# Patient Record
Sex: FEMALE | Race: WHITE | URBAN - METROPOLITAN AREA
[De-identification: names, ages, dates, MRNs, and addresses within clinical notes are randomized per-mention and may not be internally consistent; named-entity substitution may affect disease eponyms.]

---

## 2018-09-27 ENCOUNTER — INPATIENT (INPATIENT)
Facility: HOSPITAL | Age: 83
LOS: 6 days | Discharge: HOME HEALTH | End: 2018-10-04
Attending: FAMILY MEDICINE | Admitting: FAMILY MEDICINE
Payer: MEDICARE

## 2018-09-27 VITALS — HEIGHT: 64 IN | WEIGHT: 149.91 LBS

## 2018-09-27 LAB
ALBUMIN SERPL ELPH-MCNC: 3.5 G/DL — SIGNIFICANT CHANGE UP (ref 3.3–5)
ALP SERPL-CCNC: 88 U/L — SIGNIFICANT CHANGE UP (ref 40–120)
ALT FLD-CCNC: 34 U/L — SIGNIFICANT CHANGE UP (ref 12–78)
ANION GAP SERPL CALC-SCNC: 7 MMOL/L — SIGNIFICANT CHANGE UP (ref 5–17)
APPEARANCE UR: CLEAR — SIGNIFICANT CHANGE UP
AST SERPL-CCNC: 31 U/L — SIGNIFICANT CHANGE UP (ref 15–37)
BASOPHILS # BLD AUTO: 0.03 K/UL — SIGNIFICANT CHANGE UP (ref 0–0.2)
BASOPHILS NFR BLD AUTO: 0.4 % — SIGNIFICANT CHANGE UP (ref 0–2)
BILIRUB SERPL-MCNC: 0.2 MG/DL — SIGNIFICANT CHANGE UP (ref 0.2–1.2)
BILIRUB UR-MCNC: NEGATIVE — SIGNIFICANT CHANGE UP
BUN SERPL-MCNC: 34 MG/DL — HIGH (ref 7–23)
CALCIUM SERPL-MCNC: 9 MG/DL — SIGNIFICANT CHANGE UP (ref 8.5–10.1)
CHLORIDE SERPL-SCNC: 109 MMOL/L — HIGH (ref 96–108)
CO2 SERPL-SCNC: 26 MMOL/L — SIGNIFICANT CHANGE UP (ref 22–31)
COLOR SPEC: YELLOW — SIGNIFICANT CHANGE UP
CREAT SERPL-MCNC: 0.92 MG/DL — SIGNIFICANT CHANGE UP (ref 0.5–1.3)
DIFF PNL FLD: NEGATIVE — SIGNIFICANT CHANGE UP
EOSINOPHIL # BLD AUTO: 0.27 K/UL — SIGNIFICANT CHANGE UP (ref 0–0.5)
EOSINOPHIL NFR BLD AUTO: 3.4 % — SIGNIFICANT CHANGE UP (ref 0–6)
GLUCOSE SERPL-MCNC: 106 MG/DL — HIGH (ref 70–99)
GLUCOSE UR QL: NEGATIVE MG/DL — SIGNIFICANT CHANGE UP
HCT VFR BLD CALC: 41.2 % — SIGNIFICANT CHANGE UP (ref 34.5–45)
HGB BLD-MCNC: 13.7 G/DL — SIGNIFICANT CHANGE UP (ref 11.5–15.5)
IMM GRANULOCYTES NFR BLD AUTO: 0.3 % — SIGNIFICANT CHANGE UP (ref 0–1.5)
KETONES UR-MCNC: NEGATIVE — SIGNIFICANT CHANGE UP
LACTATE SERPL-SCNC: 0.9 MMOL/L — SIGNIFICANT CHANGE UP (ref 0.7–2)
LEUKOCYTE ESTERASE UR-ACNC: NEGATIVE — SIGNIFICANT CHANGE UP
LYMPHOCYTES # BLD AUTO: 1.48 K/UL — SIGNIFICANT CHANGE UP (ref 1–3.3)
LYMPHOCYTES # BLD AUTO: 18.6 % — SIGNIFICANT CHANGE UP (ref 13–44)
MCHC RBC-ENTMCNC: 31 PG — SIGNIFICANT CHANGE UP (ref 27–34)
MCHC RBC-ENTMCNC: 33.3 GM/DL — SIGNIFICANT CHANGE UP (ref 32–36)
MCV RBC AUTO: 93.2 FL — SIGNIFICANT CHANGE UP (ref 80–100)
MONOCYTES # BLD AUTO: 0.92 K/UL — HIGH (ref 0–0.9)
MONOCYTES NFR BLD AUTO: 11.6 % — SIGNIFICANT CHANGE UP (ref 2–14)
NEUTROPHILS # BLD AUTO: 5.22 K/UL — SIGNIFICANT CHANGE UP (ref 1.8–7.4)
NEUTROPHILS NFR BLD AUTO: 65.7 % — SIGNIFICANT CHANGE UP (ref 43–77)
NITRITE UR-MCNC: NEGATIVE — SIGNIFICANT CHANGE UP
NT-PROBNP SERPL-SCNC: 455 PG/ML — HIGH (ref 0–450)
PH UR: 5 — SIGNIFICANT CHANGE UP (ref 5–8)
PLATELET # BLD AUTO: 233 K/UL — SIGNIFICANT CHANGE UP (ref 150–400)
POTASSIUM SERPL-MCNC: 4 MMOL/L — SIGNIFICANT CHANGE UP (ref 3.5–5.3)
POTASSIUM SERPL-SCNC: 4 MMOL/L — SIGNIFICANT CHANGE UP (ref 3.5–5.3)
PROT SERPL-MCNC: 7.3 GM/DL — SIGNIFICANT CHANGE UP (ref 6–8.3)
PROT UR-MCNC: 30 MG/DL
RAPID RVP RESULT: SIGNIFICANT CHANGE UP
RBC # BLD: 4.42 M/UL — SIGNIFICANT CHANGE UP (ref 3.8–5.2)
RBC # FLD: 12.5 % — SIGNIFICANT CHANGE UP (ref 10.3–14.5)
SODIUM SERPL-SCNC: 142 MMOL/L — SIGNIFICANT CHANGE UP (ref 135–145)
SP GR SPEC: 1.01 — SIGNIFICANT CHANGE UP (ref 1.01–1.02)
TROPONIN I SERPL-MCNC: 0.05 NG/ML — HIGH (ref 0.01–0.04)
UROBILINOGEN FLD QL: NEGATIVE MG/DL — SIGNIFICANT CHANGE UP
WBC # BLD: 7.94 K/UL — SIGNIFICANT CHANGE UP (ref 3.8–10.5)
WBC # FLD AUTO: 7.94 K/UL — SIGNIFICANT CHANGE UP (ref 3.8–10.5)

## 2018-09-27 PROCEDURE — 99285 EMERGENCY DEPT VISIT HI MDM: CPT

## 2018-09-27 PROCEDURE — 93010 ELECTROCARDIOGRAM REPORT: CPT

## 2018-09-27 PROCEDURE — 71045 X-RAY EXAM CHEST 1 VIEW: CPT | Mod: 26

## 2018-09-27 PROCEDURE — 71250 CT THORAX DX C-: CPT | Mod: 26

## 2018-09-27 RX ORDER — SODIUM CHLORIDE 9 MG/ML
2250 INJECTION INTRAMUSCULAR; INTRAVENOUS; SUBCUTANEOUS ONCE
Qty: 0 | Refills: 0 | Status: COMPLETED | OUTPATIENT
Start: 2018-09-27 | End: 2018-09-27

## 2018-09-27 RX ORDER — IPRATROPIUM/ALBUTEROL SULFATE 18-103MCG
3 AEROSOL WITH ADAPTER (GRAM) INHALATION ONCE
Qty: 0 | Refills: 0 | Status: COMPLETED | OUTPATIENT
Start: 2018-09-27 | End: 2018-09-27

## 2018-09-27 RX ORDER — AZITHROMYCIN 500 MG/1
500 TABLET, FILM COATED ORAL ONCE
Qty: 0 | Refills: 0 | Status: COMPLETED | OUTPATIENT
Start: 2018-09-27 | End: 2018-09-27

## 2018-09-27 RX ORDER — CEFTRIAXONE 500 MG/1
1000 INJECTION, POWDER, FOR SOLUTION INTRAMUSCULAR; INTRAVENOUS ONCE
Qty: 0 | Refills: 0 | Status: COMPLETED | OUTPATIENT
Start: 2018-09-27 | End: 2018-09-27

## 2018-09-27 RX ADMIN — SODIUM CHLORIDE 750 MILLILITER(S): 9 INJECTION INTRAMUSCULAR; INTRAVENOUS; SUBCUTANEOUS at 19:45

## 2018-09-27 RX ADMIN — Medication 3 MILLILITER(S): at 20:00

## 2018-09-27 RX ADMIN — AZITHROMYCIN 255 MILLIGRAM(S): 500 TABLET, FILM COATED ORAL at 21:03

## 2018-09-27 RX ADMIN — CEFTRIAXONE 1000 MILLIGRAM(S): 500 INJECTION, POWDER, FOR SOLUTION INTRAMUSCULAR; INTRAVENOUS at 19:45

## 2018-09-27 RX ADMIN — AZITHROMYCIN 500 MILLIGRAM(S): 500 TABLET, FILM COATED ORAL at 22:00

## 2018-09-27 RX ADMIN — Medication 125 MILLIGRAM(S): at 19:50

## 2018-09-27 NOTE — ED PROVIDER NOTE - MEDICAL DECISION MAKING DETAILS
WBC, lactic acid WNL.  100.0 rectal temp however.  CT scan of chest with scattered ground glass opacities.  RVP pending.  IVF, and antibiotics given.  Admit to medicine for hypoxia in setting of respiratory infection.

## 2018-09-27 NOTE — ED STATDOCS - PROGRESS NOTE DETAILS
Talia FONG for ED attending, Dr. José: 88 y/o female with a PMHx of CAD s/p stents x2, asthma presents to the ED c/o cough, not improving despite 1 week abx. +SOB. Follow up with PMD today who advised pt to go to ED for low oxygen levels, wheezing, and coughing. Pt hypoxic with stat of 92% on room air. Will send pt to main ED for further evaluation.

## 2018-09-27 NOTE — ED PROVIDER NOTE - OBJECTIVE STATEMENT
88 y/o F with a PMHx of CAD s/p stents x2 and Asthma presents to the ED c/o cough, not improving despite 1 week abx. +SOB. Follow up with PMD today who advised pt to go to ED for low oxygen levels, wheezing, and coughing. Pt hypoxic with stat of 92% on room air. NKDA.

## 2018-09-28 DIAGNOSIS — R09.89 OTHER SPECIFIED SYMPTOMS AND SIGNS INVOLVING THE CIRCULATORY AND RESPIRATORY SYSTEMS: ICD-10-CM

## 2018-09-28 LAB
ADD ON TEST-SPECIMEN IN LAB: SIGNIFICANT CHANGE UP
CK SERPL-CCNC: 219 U/L — HIGH (ref 26–192)
CK SERPL-CCNC: 230 U/L — HIGH (ref 26–192)
CULTURE RESULTS: NO GROWTH — SIGNIFICANT CHANGE UP
D DIMER BLD IA.RAPID-MCNC: 228 NG/ML DDU — SIGNIFICANT CHANGE UP
NT-PROBNP SERPL-SCNC: 976 PG/ML — HIGH (ref 0–450)
SPECIMEN SOURCE: SIGNIFICANT CHANGE UP
TROPONIN I SERPL-MCNC: 0.05 NG/ML — HIGH (ref 0.01–0.04)
TROPONIN I SERPL-MCNC: 0.05 NG/ML — HIGH (ref 0.01–0.04)

## 2018-09-28 PROCEDURE — 93010 ELECTROCARDIOGRAM REPORT: CPT

## 2018-09-28 RX ORDER — ONDANSETRON 8 MG/1
4 TABLET, FILM COATED ORAL EVERY 6 HOURS
Qty: 0 | Refills: 0 | Status: DISCONTINUED | OUTPATIENT
Start: 2018-09-28 | End: 2018-10-04

## 2018-09-28 RX ORDER — LACTOBACILLUS ACIDOPHILUS 100MM CELL
1 CAPSULE ORAL
Qty: 0 | Refills: 0 | Status: DISCONTINUED | OUTPATIENT
Start: 2018-09-28 | End: 2018-10-04

## 2018-09-28 RX ORDER — CEFTRIAXONE 500 MG/1
1000 INJECTION, POWDER, FOR SOLUTION INTRAMUSCULAR; INTRAVENOUS EVERY 24 HOURS
Qty: 0 | Refills: 0 | Status: DISCONTINUED | OUTPATIENT
Start: 2018-09-28 | End: 2018-10-02

## 2018-09-28 RX ORDER — LEVOTHYROXINE SODIUM 125 MCG
25 TABLET ORAL DAILY
Qty: 0 | Refills: 0 | Status: DISCONTINUED | OUTPATIENT
Start: 2018-09-28 | End: 2018-10-04

## 2018-09-28 RX ORDER — ASPIRIN/CALCIUM CARB/MAGNESIUM 324 MG
325 TABLET ORAL DAILY
Qty: 0 | Refills: 0 | Status: DISCONTINUED | OUTPATIENT
Start: 2018-09-28 | End: 2018-10-04

## 2018-09-28 RX ORDER — AZITHROMYCIN 500 MG/1
500 TABLET, FILM COATED ORAL EVERY 24 HOURS
Qty: 0 | Refills: 0 | Status: DISCONTINUED | OUTPATIENT
Start: 2018-09-28 | End: 2018-10-02

## 2018-09-28 RX ORDER — MONTELUKAST 4 MG/1
10 TABLET, CHEWABLE ORAL DAILY
Qty: 0 | Refills: 0 | Status: DISCONTINUED | OUTPATIENT
Start: 2018-09-28 | End: 2018-10-04

## 2018-09-28 RX ORDER — FUROSEMIDE 40 MG
20 TABLET ORAL ONCE
Qty: 0 | Refills: 0 | Status: DISCONTINUED | OUTPATIENT
Start: 2018-09-28 | End: 2018-09-30

## 2018-09-28 RX ORDER — AMLODIPINE BESYLATE 2.5 MG/1
5 TABLET ORAL AT BEDTIME
Qty: 0 | Refills: 0 | Status: DISCONTINUED | OUTPATIENT
Start: 2018-09-28 | End: 2018-09-30

## 2018-09-28 RX ORDER — FAMOTIDINE 10 MG/ML
20 INJECTION INTRAVENOUS
Qty: 0 | Refills: 0 | Status: DISCONTINUED | OUTPATIENT
Start: 2018-09-28 | End: 2018-10-04

## 2018-09-28 RX ORDER — SIMVASTATIN 20 MG/1
5 TABLET, FILM COATED ORAL AT BEDTIME
Qty: 0 | Refills: 0 | Status: DISCONTINUED | OUTPATIENT
Start: 2018-09-28 | End: 2018-10-04

## 2018-09-28 RX ORDER — CLOPIDOGREL BISULFATE 75 MG/1
75 TABLET, FILM COATED ORAL DAILY
Qty: 0 | Refills: 0 | Status: DISCONTINUED | OUTPATIENT
Start: 2018-09-28 | End: 2018-10-04

## 2018-09-28 RX ORDER — INFLUENZA VIRUS VACCINE 15; 15; 15; 15 UG/.5ML; UG/.5ML; UG/.5ML; UG/.5ML
0.5 SUSPENSION INTRAMUSCULAR ONCE
Qty: 0 | Refills: 0 | Status: DISCONTINUED | OUTPATIENT
Start: 2018-09-28 | End: 2018-10-04

## 2018-09-28 RX ORDER — IPRATROPIUM/ALBUTEROL SULFATE 18-103MCG
3 AEROSOL WITH ADAPTER (GRAM) INHALATION EVERY 6 HOURS
Qty: 0 | Refills: 0 | Status: DISCONTINUED | OUTPATIENT
Start: 2018-09-28 | End: 2018-10-04

## 2018-09-28 RX ORDER — METOPROLOL TARTRATE 50 MG
100 TABLET ORAL DAILY
Qty: 0 | Refills: 0 | Status: DISCONTINUED | OUTPATIENT
Start: 2018-09-28 | End: 2018-10-04

## 2018-09-28 RX ORDER — CEFTRIAXONE 500 MG/1
1 INJECTION, POWDER, FOR SOLUTION INTRAMUSCULAR; INTRAVENOUS EVERY 24 HOURS
Qty: 0 | Refills: 0 | Status: DISCONTINUED | OUTPATIENT
Start: 2018-09-28 | End: 2018-09-28

## 2018-09-28 RX ADMIN — Medication 40 MILLIGRAM(S): at 14:02

## 2018-09-28 RX ADMIN — Medication 100 MILLIGRAM(S): at 10:25

## 2018-09-28 RX ADMIN — Medication 40 MILLIGRAM(S): at 22:28

## 2018-09-28 RX ADMIN — FAMOTIDINE 20 MILLIGRAM(S): 10 INJECTION INTRAVENOUS at 16:38

## 2018-09-28 RX ADMIN — Medication 10 MILLIGRAM(S): at 16:43

## 2018-09-28 RX ADMIN — Medication 325 MILLIGRAM(S): at 10:27

## 2018-09-28 RX ADMIN — CEFTRIAXONE 1000 MILLIGRAM(S): 500 INJECTION, POWDER, FOR SOLUTION INTRAMUSCULAR; INTRAVENOUS at 16:43

## 2018-09-28 RX ADMIN — MONTELUKAST 10 MILLIGRAM(S): 4 TABLET, CHEWABLE ORAL at 14:02

## 2018-09-28 RX ADMIN — Medication 200 MILLIGRAM(S): at 22:31

## 2018-09-28 RX ADMIN — CLOPIDOGREL BISULFATE 75 MILLIGRAM(S): 75 TABLET, FILM COATED ORAL at 14:02

## 2018-09-28 RX ADMIN — AZITHROMYCIN 255 MILLIGRAM(S): 500 TABLET, FILM COATED ORAL at 16:43

## 2018-09-28 RX ADMIN — Medication 200 MILLIGRAM(S): at 16:43

## 2018-09-28 RX ADMIN — Medication 1 TABLET(S): at 16:37

## 2018-09-28 RX ADMIN — Medication 25 MICROGRAM(S): at 10:25

## 2018-09-28 RX ADMIN — AMLODIPINE BESYLATE 5 MILLIGRAM(S): 2.5 TABLET ORAL at 22:28

## 2018-09-28 RX ADMIN — Medication 3 MILLILITER(S): at 19:50

## 2018-09-28 RX ADMIN — Medication 1 TABLET(S): at 16:36

## 2018-09-28 NOTE — PATIENT PROFILE ADULT. - VISION (WITH CORRECTIVE LENSES IF THE PATIENT USUALLY WEARS THEM):
Partially impaired: cannot see medication labels or newsprint, but can see obstacles in path, and the surrounding layout; can count fingers at arm's length/glasses needed. two pairs at bedside

## 2018-09-28 NOTE — ED ADULT NURSE REASSESSMENT NOTE - NS ED NURSE REASSESS COMMENT FT1
Patient A&Ox4, resting comfortably in bed.  VSS, denies pain/discomfort. Moist cough, scattered rhonchi to auscultation. No s/s of respiratory distress present. Wait time explained, hospitalist consult pending. Safety & comfort measures in place, will continue to monitor.

## 2018-09-28 NOTE — CONSULT NOTE ADULT - ASSESSMENT
Elevated cardiac enzymes- Cardiac enzymes are mildly elevated and almost in the same range.  This could likely be demand from ongoing clinical noncardiac issues.  Close monitoring recommended for now and pt to f/u with cardiologist after discharge from the hospital as outpt.     Cough , Pneumonia- Management pre primary team.    CAD S/P PCI- continue outpt meds.    Other medical issues- Management per primary team.   Thank you for allowing me to participate in the care of this patient. Please feel free to contact me with any questions.

## 2018-09-28 NOTE — H&P ADULT - ASSESSMENT
86 y/o female Bulgarian speaking, try to use   # [id number], but patient do not understand the Bulgarian  either , asking to wait for her daughter to speak to her. Most of the history taken from the chart review. Patient with a PMHx of CAD s/p stents x2 and Asthma presents to the HH with c/o cough, not improving despite 1 week antibiotics, dyspnea on exertion, wheezing. Follow up with PMD today who advised pt to go to ED for low oxygen levels, wheezing, and coughing. Pt hypoxic with stat of 92% on room air.    Denies CP, palpitations, abdominal pain, afebrile, + productive cough.    In ED -T(F): 97.7, Max: 100 HR: 98  (69 - 98) BP: 148/99  (148/73 - 176/65) RR: 18  (17 - 19) SpO2: 98%  (93% - 98%) EKG - NSR 81, no ischemic changes , troponin 0.04,   CT chest - scattered opacities may represent small vessel disease infectious vs inflammatory    s/p solumedrol 125 mg, s/p IV ceftriaxone and Azithromycin     * Acute hypoxemic respiratory failure due to   * Acute asthma exacerbation  * Suspected CAP  - tele   - O2 supplementation  - IV steroids with GI protection  - IV ceftriaxone , Azithromycin   - nebs, mucolytics  - pulmonology consult    * Elevated troponin suspected demand ischemia from respiratory problems r/o ACS  * Elevated BNP r/o CHF  - serial troponin and EKG  - check TSH, A1c, lipid profile, TSH  - 2 d echo  - cardiology consult  - c/w DAPT, BB, statins    * HTN  - c/w home meds CCB, BB, ACEi    * HLD  - c/w nstatins    * Hypothyroidism  - c/w levothyroxine   - c/w TSH     * GERD  - c/w famotidine    DVT proph - IPC  IMPROVE VTE Individual Risk Assessment    RISK                                                                Points    [  ] Previous VTE                                                  3    [  ] Thrombophilia                                               2    [  ] Lower limb paralysis                                      2        (unable to hold up >15 seconds)      [  ] Current Cancer                                              2         (within 6 months)    [  ] Immobilization > 24 hrs                                1    [  ] ICU/CCU stay > 24 hours                              1    [ x ] Age > 60                                                      1    IMPROVE VTE Score __1_______    Advanced directives - 86 y/o female Tajik speaking, try to use   # [id number], but patient do not understand the Tajik  either , asking to wait for her daughter to speak to her. Most of the history taken from the chart review. Patient with a PMHx of CAD s/p stents x2 and Asthma presents to the HH with c/o cough, not improving despite 1 week antibiotics, dyspnea on exertion, wheezing. Follow up with PMD today who advised pt to go to ED for low oxygen levels, wheezing, and coughing. Pt hypoxic with stat of 92% on room air.    Denies CP, palpitations, abdominal pain, afebrile, + productive cough.    In ED -T(F): 97.7, Max: 100 HR: 98  (69 - 98) BP: 148/99  (148/73 - 176/65) RR: 18  (17 - 19) SpO2: 98%  (93% - 98%) EKG - NSR 81, no ischemic changes , troponin 0.04,   CT chest - scattered opacities may represent small vessel disease infectious vs inflammatory    s/p solumedrol 125 mg, s/p IV ceftriaxone and Azithromycin     * Acute hypoxemic respiratory failure due to   * Acute asthma exacerbation  * Suspected CAP  - tele   - O2 supplementation  - IV steroids with GI protection  - IV ceftriaxone , Azithromycin   - nebs, mucolytics  - pulmonology consult  - check d-dimers if positive will do CTA, doppler    * Elevated troponin suspected demand ischemia from respiratory problems r/o ACS  * Elevated BNP r/o CHF  - serial troponin and EKG  - check TSH, A1c, lipid profile, TSH  - 2 d echo  - cardiology consult  - c/w DAPT, BB, statins    * HTN  - c/w home meds CCB, BB, ACEi    * HLD  - c/w nstatins    * Hypothyroidism  - c/w levothyroxine   - c/w TSH     * GERD  - c/w famotidine    DVT proph - IPC  IMPROVE VTE Individual Risk Assessment    RISK                                                                Points    [  ] Previous VTE                                                  3    [  ] Thrombophilia                                               2    [  ] Lower limb paralysis                                      2        (unable to hold up >15 seconds)      [  ] Current Cancer                                              2         (within 6 months)    [  ] Immobilization > 24 hrs                                1    [  ] ICU/CCU stay > 24 hours                              1    [ x ] Age > 60                                                      1    IMPROVE VTE Score __1_______    Advanced directives - 15 minutes spend  Full code

## 2018-09-28 NOTE — H&P ADULT - NSHPREVIEWOFSYSTEMS_GEN_ALL_CORE
REVIEW OF SYSTEMS:    CONSTITUTIONAL: No weakness, fevers or chills  EYES/ENT: No visual changes;  No vertigo or throat pain   NECK: No pain or stiffness  RESPIRATORY: +  cough, + wheezing, no hemoptysis; +  shortness of breath  CARDIOVASCULAR: No chest pain or palpitations  GASTROINTESTINAL: No abdominal or epigastric pain. No nausea, vomiting, or hematemesis; No diarrhea or constipation. No melena or hematochezia.  GENITOURINARY: No dysuria, frequency or hematuria  NEUROLOGICAL: No numbness or weakness  SKIN: No itching, burning, rashes, or lesions   All other review of systems is negative unless indicated above.

## 2018-09-28 NOTE — H&P ADULT - PMH
Asthma    CAD (coronary artery disease)    GERD (gastroesophageal reflux disease)    HTN (hypertension)    Hyperlipidemia

## 2018-09-28 NOTE — H&P ADULT - NSHPSOCIALHISTORY_GEN_ALL_CORE
Family history - poor historian unable to collect  Denies smoking, ETOH use, IVDU Denies smoking, ETOH use, IVDU

## 2018-09-28 NOTE — H&P ADULT - NSHPPHYSICALEXAM_GEN_ALL_CORE
PHYSICAL EXAM:    Constitutional: NAD, awake and alert, well-developed  HEENT: PERR, EOMI, Normal Hearing, MMM  Neck: Soft and supple, No LAD, No JVD  Respiratory: Breath sounds are decreased  bilaterally, +  wheezing, + rales , + rhonchi  Cardiovascular: S1 and S2, regular rate and rhythm, no Murmurs, gallops or rubs  Gastrointestinal: Bowel Sounds present, soft, nontender, nondistended, no guarding, no rebound  Extremities: No peripheral edema  Vascular: 2+ peripheral pulses  Neurological: A/O x 3, no focal deficits  Musculoskeletal: 5/5 strength b/l upper and lower extremities  Skin: No rashes  rectal : deferred, not indicated

## 2018-09-28 NOTE — H&P ADULT - HISTORY OF PRESENT ILLNESS
86 y/o female Tajik speaking, try to use   # [id number], but patient do not understand the Tajik  either , asking to wait for her daughter to speak to her. Most of the history taken from the chart review. Patient with a PMHx of CAD s/p stents x2 and Asthma presents to the HH with c/o cough, not improving despite 1 week antibiotics, dyspnea on exertion, wheezing. Follow up with PMD today who advised pt to go to ED for low oxygen levels, wheezing, and coughing. Pt hypoxic with stat of 92% on room air.    Denies CP, palpitations, abdominal pain, afebrile, + productive cough.    In ED -T(F): 97.7, Max: 100 HR: 98  (69 - 98) BP: 148/99  (148/73 - 176/65) RR: 18  (17 - 19) SpO2: 98%  (93% - 98%) EKG - NSR 81, no ischemic changes , troponin 0.04,   CT chest - scattered opacities may represent small vessel disease infectious vs inflammatory

## 2018-09-28 NOTE — CONSULT NOTE ADULT - ASSESSMENT
87y old Female with PMH GERD, HLD, HTN, CAD s/p stent x 2, who presented with productive cough, wheezing and dyspnea on exertion, that persisted despite 1 week of antibiotics. Patient presented at the recommendation of her  PMD. 87y old Female with PMH GERD, HLD, HTN, CAD s/p stent x 2, who presented with productive cough, wheezing and dyspnea on exertion, that persisted despite 1 week of azithromycin; she likely has asthma exacerbation, a component of volume overload, and perhaps a pneumonia.  -Continue methylprednisolone and duonebs q6  -She will need outpatient PFTs however family states that she will not be able to perform this; alternatively, we can trial her on a maintenance inhaled ICS upon discharge  -Obtain sputum culture  -Continue ceftriaxone and azithromycin  -RVP negative  -Given crackles and elevated BNP would diurese with lasix    Will continue to follow

## 2018-09-28 NOTE — ED ADULT NURSE REASSESSMENT NOTE - NS ED NURSE REASSESS COMMENT FT1
Patient resting in bed, remains as previously assessed. O2 saturation drops on room air, as low as 89%. SpO2 currently 99% on 3L NC. No s/s of respiratory distress present at this time. Plan of care discussed with patient and pt's daughter. Hand-off report to OCTAVIO Prather, awaiting transport to [facility]. Safety & comfort measures in place, hourly rounding on my time.

## 2018-09-28 NOTE — CONSULT NOTE ADULT - SUBJECTIVE AND OBJECTIVE BOX
HPI: MIKE ROPER is a 87y old Female with PMH GERD, HLD, HTN, CAD s/p stent x 2, who presented with productive cough, wheezing and dyspnea on exertion, that persisted despite 1 week of antibiotics. Patient presented at the recommendation of her  PMD.    Past Medical History:   GERD (gastroesophageal reflux disease)  Hyperlipidemia  HTN (hypertension)  Asthma  CAD (coronary artery disease)    Past Surgical History:   No significant past surgical history    Family History:    Family history of heart attack (Father)  No pertinent family history in first degree relatives     Social History:     Review of Systems:  Unable to obtain given language barrier    Allergies:  No Known Allergies    Meds  MEDICATIONS  (STANDING):  ALBUTerol/ipratropium for Nebulization 3 milliLiter(s) Nebulizer every 6 hours  amLODIPine   Tablet 5 milliGRAM(s) Oral at bedtime  aspirin enteric coated 325 milliGRAM(s) Oral daily  azithromycin  IVPB 500 milliGRAM(s) IV Intermittent every 24 hours  calcium carbonate 1250 mG  + Vitamin D (OsCal 500 + D) 1 Tablet(s) Oral daily  cefTRIAXone Injectable. 1000 milliGRAM(s) IV Push every 24 hours  clopidogrel Tablet 75 milliGRAM(s) Oral daily  enalapril 10 milliGRAM(s) Oral daily  famotidine    Tablet 20 milliGRAM(s) Oral two times a day  guaiFENesin    Syrup 200 milliGRAM(s) Oral every 8 hours  influenza   Vaccine 0.5 milliLiter(s) IntraMuscular once  lactobacillus acidophilus 1 Tablet(s) Oral two times a day with meals  levothyroxine 25 MICROGram(s) Oral daily  methylPREDNISolone sodium succinate Injectable 40 milliGRAM(s) IV Push every 8 hours  metoprolol succinate  milliGRAM(s) Oral daily  montelukast 10 milliGRAM(s) Oral daily  simvastatin 5 milliGRAM(s) Oral at bedtime    MEDICATIONS  (PRN):  aluminum hydroxide/magnesium hydroxide/simethicone Suspension 30 milliLiter(s) Oral every 4 hours PRN Dyspepsia  ondansetron Injectable 4 milliGRAM(s) IV Push every 6 hours PRN Nausea    Physical Exam  Gen: Alert, oriented, no distress  HEENT: Anicteric sclera, moist mucous membranes, no JVD, no lymphadenopathy, good dentition  Cardio: Regular rhythm and rate, normal S1S2, no murmurs  Resp: Clear to auscultation bilaterally, no wheezing or rhonchi  GI: Nontender, nondistended, normoactive bowel sounds  Ext: No cyanosis, clubbing or edema  Neuro: Nonfocal    Labs:                        13.7   7.94  )-----------( 233      ( 27 Sep 2018 19:29 )             41.2         142  |  109<H>  |  34<H>  ----------------------------<  106<H>  4.0   |  26  |  0.92    Ca    9.0      27 Sep 2018 19:29    TPro  7.3  /  Alb  3.5  /  TBili  0.2  /  DBili  x   /  AST  31  /  ALT  34  /  AlkPhos  88        Urinalysis Basic - ( 27 Sep 2018 19:29 )    Color: Yellow / Appearance: Clear / S.015 / pH: x  Gluc: x / Ketone: Negative  / Bili: Negative / Urobili: Negative mg/dL   Blood: x / Protein: 30 mg/dL / Nitrite: Negative   Leuk Esterase: Negative / RBC: 0-2 /HPF / WBC 0-2   Sq Epi: x / Non Sq Epi: Occasional / Bacteria: Occasional    Lungs, Airways and Pleura: Central tracheobronchial tree is grossly   patent. Noendobronchial lesions. Trace right pleural effusion.   Subsegmental atelectasis in the lingula and anterior left lower lobe.   Scattered areas of groundglass opacities with peribronchial thickening   nonspecific in nature may represent small vessel small airway disease   however a infectious or inflammatory etiology cannot be excluded. No   segmental consolidations.    Heart and Great Vessels: Atherosclerotic changes of the aorta and   coronary vasculature. Is mildly enlarged. No pericardial effusions.    Mediastinum and Roberta: within normal limits    Neck and Chest Wall: within normal limits    Bones: Degenerative changes and osteopenia. Nondisplaced anterior left   third through sixth rib fractures may be chronic in nature.     Upper Abdomen:  Gallstones. Mild elevation the right hemidiaphragm. Left   adrenal thickening. Small hiatal hernia. Subcentimeter hypodensities in   the liver too small to characterize.    IMPRESSION:      Scattered areas of groundglass opacities with peribronchial thickening   nonspecific in nature may represent small vessel small airway disease   however a infectious or inflammatory etiology cannot be excluded. No   segmental consolidations       RVP negative  D dimer 228    Trop 0.049 to 0.053   to 976    CXR mild pulmonary vascular congestion HPI: MIKE ROPER is a 87y old Female with PMH GERD, HLD, HTN, CAD s/p stent x 2, who presented with productive cough, wheezing and dyspnea on exertion, that persisted despite 1 week of azithromycin. Patient presented at the recommendation of her PMD as O2 sats were 92% in the office. History was obtained by daughters at bedside as patient speaks only Irish. Apparently in the past patient had never smoked. They denied a family history of lung disease. She is not on O2 as an outpatient. Daughters are concerned she minimizes her symptoms. They deny any fevers at home.    Past Medical History:   GERD (gastroesophageal reflux disease)  Hyperlipidemia  HTN (hypertension)  Asthma  CAD (coronary artery disease)    Past Surgical History:   No significant past surgical history    Family History:    Family history of heart attack (Father)  No pertinent family history in first degree relatives     Social History: Never smoker    Review of Systems:  Unable to obtain given language barrier    Allergies:  No Known Allergies    Meds  MEDICATIONS  (STANDING):  ALBUTerol/ipratropium for Nebulization 3 milliLiter(s) Nebulizer every 6 hours  amLODIPine   Tablet 5 milliGRAM(s) Oral at bedtime  aspirin enteric coated 325 milliGRAM(s) Oral daily  azithromycin  IVPB 500 milliGRAM(s) IV Intermittent every 24 hours  calcium carbonate 1250 mG  + Vitamin D (OsCal 500 + D) 1 Tablet(s) Oral daily  cefTRIAXone Injectable. 1000 milliGRAM(s) IV Push every 24 hours  clopidogrel Tablet 75 milliGRAM(s) Oral daily  enalapril 10 milliGRAM(s) Oral daily  famotidine    Tablet 20 milliGRAM(s) Oral two times a day  guaiFENesin    Syrup 200 milliGRAM(s) Oral every 8 hours  influenza   Vaccine 0.5 milliLiter(s) IntraMuscular once  lactobacillus acidophilus 1 Tablet(s) Oral two times a day with meals  levothyroxine 25 MICROGram(s) Oral daily  methylPREDNISolone sodium succinate Injectable 40 milliGRAM(s) IV Push every 8 hours  metoprolol succinate  milliGRAM(s) Oral daily  montelukast 10 milliGRAM(s) Oral daily  simvastatin 5 milliGRAM(s) Oral at bedtime    MEDICATIONS  (PRN):  aluminum hydroxide/magnesium hydroxide/simethicone Suspension 30 milliLiter(s) Oral every 4 hours PRN Dyspepsia  ondansetron Injectable 4 milliGRAM(s) IV Push every 6 hours PRN Nausea    Physical Exam  Gen: Alert, oriented, no distress  HEENT: Anicteric sclera, moist mucous membranes, no JVD, no lymphadenopathy, good dentition  Cardio: Regular rhythm and rate, normal S1S2, no murmurs  Resp: Wheezing bilaterally, with crackles at bases  GI: Nontender, nondistended, normoactive bowel sounds  Ext: No cyanosis, clubbing or edema  Neuro: Nonfocal    Labs:                        13.7   7.94  )-----------( 233      ( 27 Sep 2018 19:29 )             41.2         142  |  109<H>  |  34<H>  ----------------------------<  106<H>  4.0   |  26  |  0.92    Ca    9.0      27 Sep 2018 19:29    TPro  7.3  /  Alb  3.5  /  TBili  0.2  /  DBili  x   /  AST  31  /  ALT  34  /  AlkPhos  88        Urinalysis Basic - ( 27 Sep 2018 19:29 )    Color: Yellow / Appearance: Clear / S.015 / pH: x  Gluc: x / Ketone: Negative  / Bili: Negative / Urobili: Negative mg/dL   Blood: x / Protein: 30 mg/dL / Nitrite: Negative   Leuk Esterase: Negative / RBC: 0-2 /HPF / WBC 0-2   Sq Epi: x / Non Sq Epi: Occasional / Bacteria: Occasional    Lungs, Airways and Pleura: Central tracheobronchial tree is grossly   patent. Noendobronchial lesions. Trace right pleural effusion.   Subsegmental atelectasis in the lingula and anterior left lower lobe.   Scattered areas of groundglass opacities with peribronchial thickening   nonspecific in nature may represent small vessel small airway disease   however a infectious or inflammatory etiology cannot be excluded. No   segmental consolidations.    Heart and Great Vessels: Atherosclerotic changes of the aorta and   coronary vasculature. Is mildly enlarged. No pericardial effusions.    Mediastinum and Roberta: within normal limits    Neck and Chest Wall: within normal limits    Bones: Degenerative changes and osteopenia. Nondisplaced anterior left   third through sixth rib fractures may be chronic in nature.     Upper Abdomen:  Gallstones. Mild elevation the right hemidiaphragm. Left   adrenal thickening. Small hiatal hernia. Subcentimeter hypodensities in   the liver too small to characterize.    IMPRESSION:      Scattered areas of groundglass opacities with peribronchial thickening   nonspecific in nature may represent small vessel small airway disease   however a infectious or inflammatory etiology cannot be excluded. No   segmental consolidations       RVP negative  D dimer 228    Trop 0.049 to 0.053   to 976    CXR mild pulmonary vascular congestion

## 2018-09-28 NOTE — CONSULT NOTE ADULT - SUBJECTIVE AND OBJECTIVE BOX
Patient is a 87y old  Female who presents with a chief complaint of cough, dyspnea.     HPI:  88 y/o obese female Macedonian speaking, with a PMHx of CAD s/p PCI of RCA and Asthma presents to the HH with c/o cough, not improving despite 1 week antibiotics, dyspnea on exertion, wheezing. Follow up with PMD today who advised pt to go to ED for low oxygen levels, wheezing, and coughing. Pt hypoxic with stat of 92% on room air.    Denies CP, palpitations, abdominal pain, afebrile, + productive cough.    She denies any CP or SOB. Her daughter is at bedside translating for the patient.      In ED -T(F): 97.7, Max: 100 HR: 98  (69 - 98) BP: 148/99  (148/73 - 176/65) RR: 18  (17 - 19) SpO2: 98%  (93% - 98%) EKG - NSR 81, no ischemic changes , troponin 0.04,   CT chest - scattered opacities may represent small vessel disease infectious vs inflammatory (28 Sep 2018 09:50)      PAST MEDICAL & SURGICAL HISTORY:  GERD (gastroesophageal reflux disease)  Hyperlipidemia  HTN (hypertension)  Asthma  CAD (coronary artery disease)  No significant past surgical history      MEDICATIONS  (STANDING):  ALBUTerol/ipratropium for Nebulization 3 milliLiter(s) Nebulizer every 6 hours  amLODIPine   Tablet 5 milliGRAM(s) Oral at bedtime  aspirin enteric coated 325 milliGRAM(s) Oral daily  azithromycin  IVPB 500 milliGRAM(s) IV Intermittent every 24 hours  calcium carbonate 1250 mG  + Vitamin D (OsCal 500 + D) 1 Tablet(s) Oral daily  cefTRIAXone Injectable. 1000 milliGRAM(s) IV Push every 24 hours  clopidogrel Tablet 75 milliGRAM(s) Oral daily  enalapril 10 milliGRAM(s) Oral daily  famotidine    Tablet 20 milliGRAM(s) Oral two times a day  guaiFENesin    Syrup 200 milliGRAM(s) Oral every 8 hours  influenza   Vaccine 0.5 milliLiter(s) IntraMuscular once  lactobacillus acidophilus 1 Tablet(s) Oral two times a day with meals  levothyroxine 25 MICROGram(s) Oral daily  methylPREDNISolone sodium succinate Injectable 40 milliGRAM(s) IV Push every 8 hours  metoprolol succinate  milliGRAM(s) Oral daily  montelukast 10 milliGRAM(s) Oral daily  simvastatin 5 milliGRAM(s) Oral at bedtime    MEDICATIONS  (PRN):  aluminum hydroxide/magnesium hydroxide/simethicone Suspension 30 milliLiter(s) Oral every 4 hours PRN Dyspepsia  ondansetron Injectable 4 milliGRAM(s) IV Push every 6 hours PRN Nausea      FAMILY HISTORY:  Family history of heart attack (Father)      SOCIAL HISTORY:  no smoking or alcohol use in recent past.     REVIEW OF SYSTEMS:  CONSTITUTIONAL:    No fatigue, malaise, lethargy.  No fever or chills.  HEENT:  Eyes:  No visual changes.     ENT:  No epistaxis.  No sinus pain.    RESPIRATORY:  c/o cough.  No wheeze.  No hemoptysis.  c/o shortness of breath.  CARDIOVASCULAR:  No chest pains.  No palpitations. c/o shortness of breath, No orthopnea or PND.  GASTROINTESTINAL:  No abdominal pain.  No nausea or vomiting.    GENITOURINARY:    No hematuria.    MUSCULOSKELETAL:  No musculoskeletal pain.  No joint swelling.  No arthritis.  NEUROLOGICAL:  No tingling or numbness or weakness.  PSYCHIATRIC:  No confusion  SKIN:  No rashes.    ENDOCRINE:  No unexplained weight loss.  No polydipsia.   HEMATOLOGIC:  No anemia.  No prolonged or excessive bleeding.   ALLERGIC AND IMMUNOLOGIC:  No pruritus.          Vital Signs Last 24 Hrs  T(C): 36.7 (28 Sep 2018 11:52), Max: 37.8 (27 Sep 2018 19:46)  T(F): 98.1 (28 Sep 2018 11:52), Max: 100 (27 Sep 2018 19:46)  HR: 65 (28 Sep 2018 12:35) (65 - 98)  BP: 142/72 (28 Sep 2018 12:35) (142/72 - 185/69)  BP(mean): 96 (28 Sep 2018 02:28) (96 - 96)  RR: 22 (28 Sep 2018 11:52) (14 - 22)  SpO2: 98% (28 Sep 2018 11:52) (93% - 99%)    PHYSICAL EXAM-    Constitutional: The patient appears to be normal, well developed, well nourished and alert and oriented to time, place and person. The patient does not appear acutely ill.     Head: Head is normocephalic and atraumatic.      Neck: No jugular venous distention. No audible carotid bruits. There are strong carotid pulses bilaterally. No JVD.     Cardiovascular: Regular rate and rhythm without S3, S4. No murmurs or rubs are appreciated.      Respiratory: Breath sounds are normal. No rales. No wheezing.    Abdomen: Soft, nontender, nondistended with positive bowel sounds.      Extremity: No tenderness. No  pitting edema     Neurologic: The patient is alert and oriented.      Skin: No rash, no obvious lesions noted.      Psychiatric: The patient appears to be emotionally stable.      INTERPRETATION OF TELEMETRY: sinus rythm    ECG: Sinus rythm , poor R wave progression.     I&O's Detail      LABS:                        13.7   7.94  )-----------( 233      ( 27 Sep 2018 19:29 )             41.2         142  |  109<H>  |  34<H>  ----------------------------<  106<H>  4.0   |  26  |  0.92    Ca    9.0      27 Sep 2018 19:29    TPro  7.3  /  Alb  3.5  /  TBili  0.2  /  DBili  x   /  AST  31  /  ALT  34  /  AlkPhos  88      CARDIAC MARKERS ( 28 Sep 2018 09:46 )  0.053 ng/mL / x     / 230 U/L / x     / x      CARDIAC MARKERS ( 27 Sep 2018 19:29 )  0.049 ng/mL / x     / x     / x     / x            Urinalysis Basic - ( 27 Sep 2018 19:29 )    Color: Yellow / Appearance: Clear / S.015 / pH: x  Gluc: x / Ketone: Negative  / Bili: Negative / Urobili: Negative mg/dL   Blood: x / Protein: 30 mg/dL / Nitrite: Negative   Leuk Esterase: Negative / RBC: 0-2 /HPF / WBC 0-2   Sq Epi: x / Non Sq Epi: Occasional / Bacteria: Occasional      I&O's Summary    BNPSerum Pro-Brain Natriuretic Peptide: 976 pg/mL ( @ 09:46)  Serum Pro-Brain Natriuretic Peptide: 455 pg/mL ( @ 19:29)    RADIOLOGY & ADDITIONAL STUDIES:  < from: CT Chest No Cont (18 @ 20:25) >    EXAM:  CT CHEST                            PROCEDURE DATE:  2018          INTERPRETATION:  Exam Date: 2018 8:25 PM  Clinical Information: Hypoxia, cough  Technique:       CT of the chest was performed with axial images obtained   from the thoracic to the inlet to the bilateral adrenal glands       without IV contrast  . Maximum intensity projection images were obtained.  Comparison:  None    FINDINGS:    Lungs, Airways and Pleura: Central tracheobronchial tree is grossly   patent. Noendobronchial lesions. Trace right pleural effusion.   Subsegmental atelectasis in the lingula and anterior left lower lobe.   Scattered areas of groundglass opacities with peribronchial thickening   nonspecific in nature may represent small vessel small airway disease   however a infectious or inflammatory etiology cannot be excluded. No   segmental consolidations.    Heart and Great Vessels: Atherosclerotic changes of the aorta and   coronary vasculature. Is mildly enlarged. No pericardial effusions.    Mediastinum and Roberta: within normal limits    Neck and Chest Wall: within normal limits    Bones: Degenerative changes and osteopenia. Nondisplaced anterior left   third through sixth rib fractures may be chronic in nature.     Upper Abdomen:  Gallstones. Mild elevation the right hemidiaphragm. Left   adrenal thickening. Small hiatal hernia. Subcentimeter hypodensities in   the liver too small to characterize.    IMPRESSION:      Scattered areas of groundglass opacities with peribronchial thickening   nonspecific in nature may represent small vessel small airway disease   however a infectious or inflammatory etiology cannot be excluded. No   segmental consolidations                 BROOKS FREED M.D., ATTENDING RADIOLOGIST  This documenthas been electronically signed. Sep 27 2018  8:31PM                < end of copied text >

## 2018-09-28 NOTE — H&P ADULT - FAMILY HISTORY
No pertinent family history in first degree relatives Father  Still living? Unknown  Family history of heart attack, Age at diagnosis: Age Unknown

## 2018-09-29 LAB
ANION GAP SERPL CALC-SCNC: 6 MMOL/L — SIGNIFICANT CHANGE UP (ref 5–17)
BASOPHILS # BLD AUTO: 0.01 K/UL — SIGNIFICANT CHANGE UP (ref 0–0.2)
BASOPHILS NFR BLD AUTO: 0.1 % — SIGNIFICANT CHANGE UP (ref 0–2)
BUN SERPL-MCNC: 25 MG/DL — HIGH (ref 7–23)
CALCIUM SERPL-MCNC: 8.4 MG/DL — LOW (ref 8.5–10.1)
CHLORIDE SERPL-SCNC: 110 MMOL/L — HIGH (ref 96–108)
CHOLEST SERPL-MCNC: 125 MG/DL — SIGNIFICANT CHANGE UP (ref 10–199)
CO2 SERPL-SCNC: 27 MMOL/L — SIGNIFICANT CHANGE UP (ref 22–31)
CREAT SERPL-MCNC: 0.84 MG/DL — SIGNIFICANT CHANGE UP (ref 0.5–1.3)
EOSINOPHIL # BLD AUTO: 0 K/UL — SIGNIFICANT CHANGE UP (ref 0–0.5)
EOSINOPHIL NFR BLD AUTO: 0 % — SIGNIFICANT CHANGE UP (ref 0–6)
GLUCOSE SERPL-MCNC: 186 MG/DL — HIGH (ref 70–99)
HBA1C BLD-MCNC: 6.1 % — HIGH (ref 4–5.6)
HCT VFR BLD CALC: 44.3 % — SIGNIFICANT CHANGE UP (ref 34.5–45)
HDLC SERPL-MCNC: 49 MG/DL — LOW
HGB BLD-MCNC: 14.6 G/DL — SIGNIFICANT CHANGE UP (ref 11.5–15.5)
IMM GRANULOCYTES NFR BLD AUTO: 0.5 % — SIGNIFICANT CHANGE UP (ref 0–1.5)
LIPID PNL WITH DIRECT LDL SERPL: 66 MG/DL — SIGNIFICANT CHANGE UP
LYMPHOCYTES # BLD AUTO: 0.78 K/UL — LOW (ref 1–3.3)
LYMPHOCYTES # BLD AUTO: 5.2 % — LOW (ref 13–44)
MAGNESIUM SERPL-MCNC: 2.4 MG/DL — SIGNIFICANT CHANGE UP (ref 1.6–2.6)
MCHC RBC-ENTMCNC: 31.1 PG — SIGNIFICANT CHANGE UP (ref 27–34)
MCHC RBC-ENTMCNC: 33 GM/DL — SIGNIFICANT CHANGE UP (ref 32–36)
MCV RBC AUTO: 94.3 FL — SIGNIFICANT CHANGE UP (ref 80–100)
MONOCYTES # BLD AUTO: 0.33 K/UL — SIGNIFICANT CHANGE UP (ref 0–0.9)
MONOCYTES NFR BLD AUTO: 2.2 % — SIGNIFICANT CHANGE UP (ref 2–14)
NEUTROPHILS # BLD AUTO: 13.82 K/UL — HIGH (ref 1.8–7.4)
NEUTROPHILS NFR BLD AUTO: 92 % — HIGH (ref 43–77)
NRBC # BLD: 0 /100 WBCS — SIGNIFICANT CHANGE UP (ref 0–0)
NT-PROBNP SERPL-SCNC: 1245 PG/ML — HIGH (ref 0–450)
PHOSPHATE SERPL-MCNC: 3.1 MG/DL — SIGNIFICANT CHANGE UP (ref 2.5–4.5)
PLATELET # BLD AUTO: 249 K/UL — SIGNIFICANT CHANGE UP (ref 150–400)
POTASSIUM SERPL-MCNC: 4.2 MMOL/L — SIGNIFICANT CHANGE UP (ref 3.5–5.3)
POTASSIUM SERPL-SCNC: 4.2 MMOL/L — SIGNIFICANT CHANGE UP (ref 3.5–5.3)
RBC # BLD: 4.7 M/UL — SIGNIFICANT CHANGE UP (ref 3.8–5.2)
RBC # FLD: 12.4 % — SIGNIFICANT CHANGE UP (ref 10.3–14.5)
SODIUM SERPL-SCNC: 143 MMOL/L — SIGNIFICANT CHANGE UP (ref 135–145)
TOTAL CHOLESTEROL/HDL RATIO MEASUREMENT: 2.6 RATIO — LOW (ref 3.3–7.1)
TRIGL SERPL-MCNC: 50 MG/DL — SIGNIFICANT CHANGE UP (ref 10–149)
TSH SERPL-MCNC: 0.35 UU/ML — SIGNIFICANT CHANGE UP (ref 0.34–4.82)
WBC # BLD: 15.01 K/UL — HIGH (ref 3.8–10.5)
WBC # FLD AUTO: 15.01 K/UL — HIGH (ref 3.8–10.5)

## 2018-09-29 PROCEDURE — 93306 TTE W/DOPPLER COMPLETE: CPT | Mod: 26

## 2018-09-29 PROCEDURE — 93010 ELECTROCARDIOGRAM REPORT: CPT

## 2018-09-29 RX ORDER — FUROSEMIDE 40 MG
20 TABLET ORAL ONCE
Qty: 0 | Refills: 0 | Status: COMPLETED | OUTPATIENT
Start: 2018-09-29 | End: 2018-09-29

## 2018-09-29 RX ORDER — ALPRAZOLAM 0.25 MG
0.25 TABLET ORAL ONCE
Qty: 0 | Refills: 0 | Status: DISCONTINUED | OUTPATIENT
Start: 2018-09-29 | End: 2018-09-29

## 2018-09-29 RX ADMIN — Medication 1 TABLET(S): at 07:59

## 2018-09-29 RX ADMIN — Medication 3 MILLILITER(S): at 13:18

## 2018-09-29 RX ADMIN — Medication 0.25 MILLIGRAM(S): at 22:08

## 2018-09-29 RX ADMIN — Medication 1 TABLET(S): at 11:13

## 2018-09-29 RX ADMIN — Medication 100 MILLIGRAM(S): at 06:01

## 2018-09-29 RX ADMIN — SIMVASTATIN 5 MILLIGRAM(S): 20 TABLET, FILM COATED ORAL at 00:00

## 2018-09-29 RX ADMIN — Medication 3 MILLILITER(S): at 07:51

## 2018-09-29 RX ADMIN — Medication 200 MILLIGRAM(S): at 13:50

## 2018-09-29 RX ADMIN — Medication 40 MILLIGRAM(S): at 13:50

## 2018-09-29 RX ADMIN — CEFTRIAXONE 1000 MILLIGRAM(S): 500 INJECTION, POWDER, FOR SOLUTION INTRAMUSCULAR; INTRAVENOUS at 16:48

## 2018-09-29 RX ADMIN — SIMVASTATIN 5 MILLIGRAM(S): 20 TABLET, FILM COATED ORAL at 22:09

## 2018-09-29 RX ADMIN — Medication 1 TABLET(S): at 17:39

## 2018-09-29 RX ADMIN — Medication 10 MILLIGRAM(S): at 07:59

## 2018-09-29 RX ADMIN — AMLODIPINE BESYLATE 5 MILLIGRAM(S): 2.5 TABLET ORAL at 22:09

## 2018-09-29 RX ADMIN — Medication 3 MILLILITER(S): at 20:22

## 2018-09-29 RX ADMIN — Medication 325 MILLIGRAM(S): at 11:15

## 2018-09-29 RX ADMIN — AZITHROMYCIN 255 MILLIGRAM(S): 500 TABLET, FILM COATED ORAL at 16:48

## 2018-09-29 RX ADMIN — FAMOTIDINE 20 MILLIGRAM(S): 10 INJECTION INTRAVENOUS at 17:39

## 2018-09-29 RX ADMIN — Medication 200 MILLIGRAM(S): at 06:02

## 2018-09-29 RX ADMIN — Medication 40 MILLIGRAM(S): at 22:09

## 2018-09-29 RX ADMIN — Medication 20 MILLIGRAM(S): at 10:34

## 2018-09-29 RX ADMIN — MONTELUKAST 10 MILLIGRAM(S): 4 TABLET, CHEWABLE ORAL at 11:13

## 2018-09-29 RX ADMIN — Medication 3 MILLILITER(S): at 01:28

## 2018-09-29 RX ADMIN — Medication 25 MICROGRAM(S): at 06:01

## 2018-09-29 RX ADMIN — Medication 40 MILLIGRAM(S): at 06:01

## 2018-09-29 RX ADMIN — Medication 200 MILLIGRAM(S): at 22:08

## 2018-09-29 RX ADMIN — FAMOTIDINE 20 MILLIGRAM(S): 10 INJECTION INTRAVENOUS at 06:01

## 2018-09-29 RX ADMIN — CLOPIDOGREL BISULFATE 75 MILLIGRAM(S): 75 TABLET, FILM COATED ORAL at 11:14

## 2018-09-30 LAB
ANION GAP SERPL CALC-SCNC: 7 MMOL/L — SIGNIFICANT CHANGE UP (ref 5–17)
BASOPHILS # BLD AUTO: 0.01 K/UL — SIGNIFICANT CHANGE UP (ref 0–0.2)
BASOPHILS NFR BLD AUTO: 0.1 % — SIGNIFICANT CHANGE UP (ref 0–2)
BUN SERPL-MCNC: 34 MG/DL — HIGH (ref 7–23)
CALCIUM SERPL-MCNC: 8 MG/DL — LOW (ref 8.5–10.1)
CHLORIDE SERPL-SCNC: 107 MMOL/L — SIGNIFICANT CHANGE UP (ref 96–108)
CO2 SERPL-SCNC: 27 MMOL/L — SIGNIFICANT CHANGE UP (ref 22–31)
CREAT SERPL-MCNC: 0.98 MG/DL — SIGNIFICANT CHANGE UP (ref 0.5–1.3)
EOSINOPHIL # BLD AUTO: 0 K/UL — SIGNIFICANT CHANGE UP (ref 0–0.5)
EOSINOPHIL NFR BLD AUTO: 0 % — SIGNIFICANT CHANGE UP (ref 0–6)
GLUCOSE SERPL-MCNC: 172 MG/DL — HIGH (ref 70–99)
HCT VFR BLD CALC: 44.1 % — SIGNIFICANT CHANGE UP (ref 34.5–45)
HGB BLD-MCNC: 14.4 G/DL — SIGNIFICANT CHANGE UP (ref 11.5–15.5)
IMM GRANULOCYTES NFR BLD AUTO: 0.5 % — SIGNIFICANT CHANGE UP (ref 0–1.5)
LYMPHOCYTES # BLD AUTO: 0.69 K/UL — LOW (ref 1–3.3)
LYMPHOCYTES # BLD AUTO: 4.7 % — LOW (ref 13–44)
MCHC RBC-ENTMCNC: 31 PG — SIGNIFICANT CHANGE UP (ref 27–34)
MCHC RBC-ENTMCNC: 32.7 GM/DL — SIGNIFICANT CHANGE UP (ref 32–36)
MCV RBC AUTO: 94.8 FL — SIGNIFICANT CHANGE UP (ref 80–100)
MONOCYTES # BLD AUTO: 0.53 K/UL — SIGNIFICANT CHANGE UP (ref 0–0.9)
MONOCYTES NFR BLD AUTO: 3.6 % — SIGNIFICANT CHANGE UP (ref 2–14)
NEUTROPHILS # BLD AUTO: 13.45 K/UL — HIGH (ref 1.8–7.4)
NEUTROPHILS NFR BLD AUTO: 91.1 % — HIGH (ref 43–77)
NRBC # BLD: 0 /100 WBCS — SIGNIFICANT CHANGE UP (ref 0–0)
NT-PROBNP SERPL-SCNC: 800 PG/ML — HIGH (ref 0–450)
PLATELET # BLD AUTO: 238 K/UL — SIGNIFICANT CHANGE UP (ref 150–400)
POTASSIUM SERPL-MCNC: 4.3 MMOL/L — SIGNIFICANT CHANGE UP (ref 3.5–5.3)
POTASSIUM SERPL-SCNC: 4.3 MMOL/L — SIGNIFICANT CHANGE UP (ref 3.5–5.3)
RBC # BLD: 4.65 M/UL — SIGNIFICANT CHANGE UP (ref 3.8–5.2)
RBC # FLD: 12.7 % — SIGNIFICANT CHANGE UP (ref 10.3–14.5)
SODIUM SERPL-SCNC: 141 MMOL/L — SIGNIFICANT CHANGE UP (ref 135–145)
WBC # BLD: 14.75 K/UL — HIGH (ref 3.8–10.5)
WBC # FLD AUTO: 14.75 K/UL — HIGH (ref 3.8–10.5)

## 2018-09-30 PROCEDURE — 71045 X-RAY EXAM CHEST 1 VIEW: CPT | Mod: 26

## 2018-09-30 RX ORDER — QUETIAPINE FUMARATE 200 MG/1
12.5 TABLET, FILM COATED ORAL DAILY
Qty: 0 | Refills: 0 | Status: DISCONTINUED | OUTPATIENT
Start: 2018-09-30 | End: 2018-10-04

## 2018-09-30 RX ORDER — AMLODIPINE BESYLATE 2.5 MG/1
10 TABLET ORAL AT BEDTIME
Qty: 0 | Refills: 0 | Status: DISCONTINUED | OUTPATIENT
Start: 2018-09-30 | End: 2018-10-04

## 2018-09-30 RX ORDER — FUROSEMIDE 40 MG
20 TABLET ORAL DAILY
Qty: 0 | Refills: 0 | Status: DISCONTINUED | OUTPATIENT
Start: 2018-09-30 | End: 2018-10-02

## 2018-09-30 RX ORDER — LANOLIN ALCOHOL/MO/W.PET/CERES
9 CREAM (GRAM) TOPICAL AT BEDTIME
Qty: 0 | Refills: 0 | Status: DISCONTINUED | OUTPATIENT
Start: 2018-09-30 | End: 2018-10-04

## 2018-09-30 RX ADMIN — Medication 3 MILLILITER(S): at 13:41

## 2018-09-30 RX ADMIN — AMLODIPINE BESYLATE 10 MILLIGRAM(S): 2.5 TABLET ORAL at 20:38

## 2018-09-30 RX ADMIN — Medication 1 TABLET(S): at 17:16

## 2018-09-30 RX ADMIN — FAMOTIDINE 20 MILLIGRAM(S): 10 INJECTION INTRAVENOUS at 17:16

## 2018-09-30 RX ADMIN — QUETIAPINE FUMARATE 12.5 MILLIGRAM(S): 200 TABLET, FILM COATED ORAL at 20:38

## 2018-09-30 RX ADMIN — Medication 325 MILLIGRAM(S): at 11:38

## 2018-09-30 RX ADMIN — Medication 10 MILLIGRAM(S): at 06:17

## 2018-09-30 RX ADMIN — Medication 200 MILLIGRAM(S): at 06:17

## 2018-09-30 RX ADMIN — Medication 100 MILLIGRAM(S): at 06:17

## 2018-09-30 RX ADMIN — Medication 200 MILLIGRAM(S): at 13:31

## 2018-09-30 RX ADMIN — Medication 40 MILLIGRAM(S): at 06:17

## 2018-09-30 RX ADMIN — Medication 3 MILLILITER(S): at 07:57

## 2018-09-30 RX ADMIN — Medication 200 MILLIGRAM(S): at 20:39

## 2018-09-30 RX ADMIN — CEFTRIAXONE 1000 MILLIGRAM(S): 500 INJECTION, POWDER, FOR SOLUTION INTRAMUSCULAR; INTRAVENOUS at 16:34

## 2018-09-30 RX ADMIN — AZITHROMYCIN 255 MILLIGRAM(S): 500 TABLET, FILM COATED ORAL at 16:34

## 2018-09-30 RX ADMIN — Medication 1 TABLET(S): at 07:44

## 2018-09-30 RX ADMIN — MONTELUKAST 10 MILLIGRAM(S): 4 TABLET, CHEWABLE ORAL at 13:25

## 2018-09-30 RX ADMIN — Medication 1 TABLET(S): at 11:38

## 2018-09-30 RX ADMIN — CLOPIDOGREL BISULFATE 75 MILLIGRAM(S): 75 TABLET, FILM COATED ORAL at 11:38

## 2018-09-30 RX ADMIN — Medication 25 MICROGRAM(S): at 06:18

## 2018-09-30 RX ADMIN — Medication 9 MILLIGRAM(S): at 20:38

## 2018-09-30 RX ADMIN — FAMOTIDINE 20 MILLIGRAM(S): 10 INJECTION INTRAVENOUS at 06:17

## 2018-09-30 RX ADMIN — SIMVASTATIN 5 MILLIGRAM(S): 20 TABLET, FILM COATED ORAL at 20:38

## 2018-09-30 RX ADMIN — Medication 3 MILLILITER(S): at 21:27

## 2018-09-30 RX ADMIN — Medication 20 MILLIGRAM(S): at 13:26

## 2018-10-01 LAB
ANION GAP SERPL CALC-SCNC: 6 MMOL/L — SIGNIFICANT CHANGE UP (ref 5–17)
BASOPHILS # BLD AUTO: 0.01 K/UL — SIGNIFICANT CHANGE UP (ref 0–0.2)
BASOPHILS NFR BLD AUTO: 0.1 % — SIGNIFICANT CHANGE UP (ref 0–2)
BUN SERPL-MCNC: 39 MG/DL — HIGH (ref 7–23)
CALCIUM SERPL-MCNC: 8.2 MG/DL — LOW (ref 8.5–10.1)
CHLORIDE SERPL-SCNC: 110 MMOL/L — HIGH (ref 96–108)
CO2 SERPL-SCNC: 30 MMOL/L — SIGNIFICANT CHANGE UP (ref 22–31)
CREAT SERPL-MCNC: 0.89 MG/DL — SIGNIFICANT CHANGE UP (ref 0.5–1.3)
EOSINOPHIL # BLD AUTO: 0.01 K/UL — SIGNIFICANT CHANGE UP (ref 0–0.5)
EOSINOPHIL NFR BLD AUTO: 0.1 % — SIGNIFICANT CHANGE UP (ref 0–6)
GLUCOSE SERPL-MCNC: 124 MG/DL — HIGH (ref 70–99)
HCT VFR BLD CALC: 43.7 % — SIGNIFICANT CHANGE UP (ref 34.5–45)
HGB BLD-MCNC: 14 G/DL — SIGNIFICANT CHANGE UP (ref 11.5–15.5)
IMM GRANULOCYTES NFR BLD AUTO: 0.6 % — SIGNIFICANT CHANGE UP (ref 0–1.5)
LYMPHOCYTES # BLD AUTO: 1.59 K/UL — SIGNIFICANT CHANGE UP (ref 1–3.3)
LYMPHOCYTES # BLD AUTO: 13.8 % — SIGNIFICANT CHANGE UP (ref 13–44)
MCHC RBC-ENTMCNC: 30.5 PG — SIGNIFICANT CHANGE UP (ref 27–34)
MCHC RBC-ENTMCNC: 32 GM/DL — SIGNIFICANT CHANGE UP (ref 32–36)
MCV RBC AUTO: 95.2 FL — SIGNIFICANT CHANGE UP (ref 80–100)
MONOCYTES # BLD AUTO: 1.22 K/UL — HIGH (ref 0–0.9)
MONOCYTES NFR BLD AUTO: 10.6 % — SIGNIFICANT CHANGE UP (ref 2–14)
NEUTROPHILS # BLD AUTO: 8.62 K/UL — HIGH (ref 1.8–7.4)
NEUTROPHILS NFR BLD AUTO: 74.8 % — SIGNIFICANT CHANGE UP (ref 43–77)
NRBC # BLD: 0 /100 WBCS — SIGNIFICANT CHANGE UP (ref 0–0)
NT-PROBNP SERPL-SCNC: 664 PG/ML — HIGH (ref 0–450)
PLATELET # BLD AUTO: 213 K/UL — SIGNIFICANT CHANGE UP (ref 150–400)
POTASSIUM SERPL-MCNC: 4.1 MMOL/L — SIGNIFICANT CHANGE UP (ref 3.5–5.3)
POTASSIUM SERPL-SCNC: 4.1 MMOL/L — SIGNIFICANT CHANGE UP (ref 3.5–5.3)
RBC # BLD: 4.59 M/UL — SIGNIFICANT CHANGE UP (ref 3.8–5.2)
RBC # FLD: 12.8 % — SIGNIFICANT CHANGE UP (ref 10.3–14.5)
SODIUM SERPL-SCNC: 146 MMOL/L — HIGH (ref 135–145)
WBC # BLD: 11.52 K/UL — HIGH (ref 3.8–10.5)
WBC # FLD AUTO: 11.52 K/UL — HIGH (ref 3.8–10.5)

## 2018-10-01 RX ADMIN — Medication 20 MILLIGRAM(S): at 11:58

## 2018-10-01 RX ADMIN — CLOPIDOGREL BISULFATE 75 MILLIGRAM(S): 75 TABLET, FILM COATED ORAL at 11:58

## 2018-10-01 RX ADMIN — AZITHROMYCIN 255 MILLIGRAM(S): 500 TABLET, FILM COATED ORAL at 21:16

## 2018-10-01 RX ADMIN — Medication 1 TABLET(S): at 18:00

## 2018-10-01 RX ADMIN — Medication 9 MILLIGRAM(S): at 21:15

## 2018-10-01 RX ADMIN — Medication 10 MILLIGRAM(S): at 05:47

## 2018-10-01 RX ADMIN — SIMVASTATIN 5 MILLIGRAM(S): 20 TABLET, FILM COATED ORAL at 21:15

## 2018-10-01 RX ADMIN — Medication 25 MICROGRAM(S): at 05:47

## 2018-10-01 RX ADMIN — CEFTRIAXONE 1000 MILLIGRAM(S): 500 INJECTION, POWDER, FOR SOLUTION INTRAMUSCULAR; INTRAVENOUS at 19:39

## 2018-10-01 RX ADMIN — Medication 3 MILLILITER(S): at 13:21

## 2018-10-01 RX ADMIN — MONTELUKAST 10 MILLIGRAM(S): 4 TABLET, CHEWABLE ORAL at 11:58

## 2018-10-01 RX ADMIN — AMLODIPINE BESYLATE 10 MILLIGRAM(S): 2.5 TABLET ORAL at 21:15

## 2018-10-01 RX ADMIN — Medication 200 MILLIGRAM(S): at 21:16

## 2018-10-01 RX ADMIN — Medication 1 TABLET(S): at 11:58

## 2018-10-01 RX ADMIN — Medication 200 MILLIGRAM(S): at 05:47

## 2018-10-01 RX ADMIN — Medication 3 MILLILITER(S): at 20:18

## 2018-10-01 RX ADMIN — FAMOTIDINE 20 MILLIGRAM(S): 10 INJECTION INTRAVENOUS at 05:47

## 2018-10-01 RX ADMIN — Medication 3 MILLILITER(S): at 07:46

## 2018-10-01 RX ADMIN — Medication 1 TABLET(S): at 08:31

## 2018-10-01 RX ADMIN — FAMOTIDINE 20 MILLIGRAM(S): 10 INJECTION INTRAVENOUS at 18:00

## 2018-10-01 RX ADMIN — Medication 20 MILLIGRAM(S): at 05:48

## 2018-10-01 RX ADMIN — Medication 100 MILLIGRAM(S): at 05:47

## 2018-10-01 RX ADMIN — Medication 325 MILLIGRAM(S): at 14:17

## 2018-10-01 RX ADMIN — Medication 200 MILLIGRAM(S): at 14:15

## 2018-10-01 NOTE — DIETITIAN INITIAL EVALUATION ADULT. - OTHER INFO
Pt seen for LOS. Pt pmhx noted below, sig for Grds, HLD, HTN, Asthma, and CAD. Pt dgt helped translate and provided information on pt. Pt appetite has been good no changes in intake. Pt has no difficulty chewing food or swallowing food. Pt denies n/v/d/c. no edema, ben 18, no skin breakdown. Recommend c/w current nutrition plan will continue to monitor pt's nutritional status.

## 2018-10-01 NOTE — PHYSICAL THERAPY INITIAL EVALUATION ADULT - CRITERIA FOR SKILLED THERAPEUTIC INTERVENTIONS
anticipated equipment needs at discharge/predicted duration of therapy intervention/therapy frequency/anticipated discharge recommendation/risk reduction/prevention/rehab potential

## 2018-10-01 NOTE — DIETITIAN INITIAL EVALUATION ADULT. - PERTINENT MEDS FT
Ecotrin, Plavix, Zithromax, Rocephin, Solu-medrol, Norvasc, Lasix, Melatonin,. Seroquel, Duoneb, Pepcid, Synthroid, Toprol xl, Lactobacillus acidophilus, Singulair, Zofran, Zocor,

## 2018-10-01 NOTE — PHYSICAL THERAPY INITIAL EVALUATION ADULT - PERTINENT HX OF CURRENT PROBLEM, REHAB EVAL
wheezing ,+asthma exacerbation cough x 1 week despite OP Abx ,BROUSSARD, wheezing ,+asthma exacerbation,O2 SAT decreased on room air

## 2018-10-01 NOTE — DIETITIAN INITIAL EVALUATION ADULT. - SOURCE
family/significant other/Daughter provided information. Pt speaks Taiwanese and daughter help translate. Noted in chart pt having difficulty with  phone.

## 2018-10-01 NOTE — PHYSICAL THERAPY INITIAL EVALUATION ADULT - DIAGNOSIS, PT EVAL
asthma exacerbation,pulmonary vascular congestion/CHF,diastolic dysfxn acute hypoxic respiratory failure ,+asthma exacerbation,pulmonary vascular congestion/CHF,diastolic dysfxn

## 2018-10-01 NOTE — PHYSICAL THERAPY INITIAL EVALUATION ADULT - LIVES WITH, PROFILE
resides with daughter Olinda Mcclendon in [place] ; another daughter Demario Ruff involved/children alone/2 daughters Olinda Mcclendon &  Demario Ruff are very involved; family checks in on patient frequently and

## 2018-10-01 NOTE — PHYSICAL THERAPY INITIAL EVALUATION ADULT - GENERAL OBSERVATIONS, REHAB EVAL
pt sitting up in bedside chair,awake,alert,speaks only Belarusian,daughter at bedside provides translation; wearing HM and nasal o2 3L/min,breathing comfortably

## 2018-10-01 NOTE — DIETITIAN INITIAL EVALUATION ADULT. - PERTINENT LABORATORY DATA
10-01 Na146 mmol/L<H> Glu 124 mg/dL<H> K+ 4.1 mmol/L Cr  0.89 mg/dL BUN 39 mg/dL<H> Phos n/a   Alb n/a   PAB n/a

## 2018-10-02 LAB
ANION GAP SERPL CALC-SCNC: 6 MMOL/L — SIGNIFICANT CHANGE UP (ref 5–17)
BUN SERPL-MCNC: 38 MG/DL — HIGH (ref 7–23)
CALCIUM SERPL-MCNC: 8.7 MG/DL — SIGNIFICANT CHANGE UP (ref 8.5–10.1)
CHLORIDE SERPL-SCNC: 108 MMOL/L — SIGNIFICANT CHANGE UP (ref 96–108)
CO2 SERPL-SCNC: 30 MMOL/L — SIGNIFICANT CHANGE UP (ref 22–31)
CREAT SERPL-MCNC: 0.81 MG/DL — SIGNIFICANT CHANGE UP (ref 0.5–1.3)
GLUCOSE SERPL-MCNC: 103 MG/DL — HIGH (ref 70–99)
HCT VFR BLD CALC: 45 % — SIGNIFICANT CHANGE UP (ref 34.5–45)
HGB BLD-MCNC: 14.1 G/DL — SIGNIFICANT CHANGE UP (ref 11.5–15.5)
LEGIONELLA AB SER-ACNC: NEGATIVE — SIGNIFICANT CHANGE UP
MAGNESIUM SERPL-MCNC: 2.6 MG/DL — SIGNIFICANT CHANGE UP (ref 1.6–2.6)
MCHC RBC-ENTMCNC: 30.5 PG — SIGNIFICANT CHANGE UP (ref 27–34)
MCHC RBC-ENTMCNC: 31.3 GM/DL — LOW (ref 32–36)
MCV RBC AUTO: 97.2 FL — SIGNIFICANT CHANGE UP (ref 80–100)
NRBC # BLD: 0 /100 WBCS — SIGNIFICANT CHANGE UP (ref 0–0)
PHOSPHATE SERPL-MCNC: 3.5 MG/DL — SIGNIFICANT CHANGE UP (ref 2.5–4.5)
PLATELET # BLD AUTO: 208 K/UL — SIGNIFICANT CHANGE UP (ref 150–400)
POTASSIUM SERPL-MCNC: 4.1 MMOL/L — SIGNIFICANT CHANGE UP (ref 3.5–5.3)
POTASSIUM SERPL-SCNC: 4.1 MMOL/L — SIGNIFICANT CHANGE UP (ref 3.5–5.3)
RBC # BLD: 4.63 M/UL — SIGNIFICANT CHANGE UP (ref 3.8–5.2)
RBC # FLD: 12.4 % — SIGNIFICANT CHANGE UP (ref 10.3–14.5)
SODIUM SERPL-SCNC: 144 MMOL/L — SIGNIFICANT CHANGE UP (ref 135–145)
WBC # BLD: 9.25 K/UL — SIGNIFICANT CHANGE UP (ref 3.8–10.5)
WBC # FLD AUTO: 9.25 K/UL — SIGNIFICANT CHANGE UP (ref 3.8–10.5)

## 2018-10-02 RX ORDER — FUROSEMIDE 40 MG
20 TABLET ORAL DAILY
Qty: 0 | Refills: 0 | Status: DISCONTINUED | OUTPATIENT
Start: 2018-10-03 | End: 2018-10-04

## 2018-10-02 RX ORDER — CEFUROXIME AXETIL 250 MG
500 TABLET ORAL EVERY 12 HOURS
Qty: 0 | Refills: 0 | Status: DISCONTINUED | OUTPATIENT
Start: 2018-10-03 | End: 2018-10-04

## 2018-10-02 RX ORDER — LACTOBACILLUS ACIDOPHILUS 100MM CELL
1 CAPSULE ORAL DAILY
Qty: 0 | Refills: 0 | Status: DISCONTINUED | OUTPATIENT
Start: 2018-10-02 | End: 2018-10-04

## 2018-10-02 RX ADMIN — Medication 200 MILLIGRAM(S): at 15:21

## 2018-10-02 RX ADMIN — Medication 25 MICROGRAM(S): at 08:38

## 2018-10-02 RX ADMIN — FAMOTIDINE 20 MILLIGRAM(S): 10 INJECTION INTRAVENOUS at 19:12

## 2018-10-02 RX ADMIN — Medication 3 MILLILITER(S): at 13:22

## 2018-10-02 RX ADMIN — CLOPIDOGREL BISULFATE 75 MILLIGRAM(S): 75 TABLET, FILM COATED ORAL at 14:04

## 2018-10-02 RX ADMIN — MONTELUKAST 10 MILLIGRAM(S): 4 TABLET, CHEWABLE ORAL at 14:06

## 2018-10-02 RX ADMIN — FAMOTIDINE 20 MILLIGRAM(S): 10 INJECTION INTRAVENOUS at 05:39

## 2018-10-02 RX ADMIN — Medication 3 MILLILITER(S): at 07:50

## 2018-10-02 RX ADMIN — Medication 10 MILLIGRAM(S): at 14:05

## 2018-10-02 RX ADMIN — SIMVASTATIN 5 MILLIGRAM(S): 20 TABLET, FILM COATED ORAL at 22:56

## 2018-10-02 RX ADMIN — Medication 1 TABLET(S): at 14:05

## 2018-10-02 RX ADMIN — Medication 20 MILLIGRAM(S): at 14:11

## 2018-10-02 RX ADMIN — Medication 3 MILLILITER(S): at 19:52

## 2018-10-02 RX ADMIN — Medication 200 MILLIGRAM(S): at 23:00

## 2018-10-02 RX ADMIN — Medication 200 MILLIGRAM(S): at 05:39

## 2018-10-02 RX ADMIN — Medication 325 MILLIGRAM(S): at 14:12

## 2018-10-02 RX ADMIN — AMLODIPINE BESYLATE 10 MILLIGRAM(S): 2.5 TABLET ORAL at 22:56

## 2018-10-02 RX ADMIN — Medication 9 MILLIGRAM(S): at 22:56

## 2018-10-02 RX ADMIN — Medication 100 MILLIGRAM(S): at 14:30

## 2018-10-02 RX ADMIN — Medication 1 TABLET(S): at 09:02

## 2018-10-02 RX ADMIN — Medication 20 MILLIGRAM(S): at 05:39

## 2018-10-03 ENCOUNTER — TRANSCRIPTION ENCOUNTER (OUTPATIENT)
Age: 83
End: 2018-10-03

## 2018-10-03 LAB
CULTURE RESULTS: SIGNIFICANT CHANGE UP
CULTURE RESULTS: SIGNIFICANT CHANGE UP
M PNEUMO IGM SER-ACNC: 133 UNITS/ML — SIGNIFICANT CHANGE UP
MYCOPLASMA AG SPEC QL: NEGATIVE — SIGNIFICANT CHANGE UP
SPECIMEN SOURCE: SIGNIFICANT CHANGE UP
SPECIMEN SOURCE: SIGNIFICANT CHANGE UP

## 2018-10-03 RX ORDER — ALBUTEROL 90 UG/1
2 AEROSOL, METERED ORAL
Qty: 1 | Refills: 0
Start: 2018-10-03 | End: 2018-11-01

## 2018-10-03 RX ORDER — FUROSEMIDE 40 MG
1 TABLET ORAL
Qty: 30 | Refills: 0
Start: 2018-10-03 | End: 2018-11-01

## 2018-10-03 RX ORDER — CEFUROXIME AXETIL 250 MG
1 TABLET ORAL
Qty: 12 | Refills: 0
Start: 2018-10-03 | End: 2018-10-08

## 2018-10-03 RX ORDER — LANOLIN ALCOHOL/MO/W.PET/CERES
3 CREAM (GRAM) TOPICAL
Qty: 90 | Refills: 0
Start: 2018-10-03 | End: 2018-11-01

## 2018-10-03 RX ORDER — LACTOBACILLUS ACIDOPHILUS 100MM CELL
1 CAPSULE ORAL
Qty: 12 | Refills: 0
Start: 2018-10-03 | End: 2018-10-08

## 2018-10-03 RX ADMIN — Medication 3 MILLILITER(S): at 20:37

## 2018-10-03 RX ADMIN — Medication 325 MILLIGRAM(S): at 14:26

## 2018-10-03 RX ADMIN — MONTELUKAST 10 MILLIGRAM(S): 4 TABLET, CHEWABLE ORAL at 11:16

## 2018-10-03 RX ADMIN — Medication 10 MILLIGRAM(S): at 05:38

## 2018-10-03 RX ADMIN — Medication 1 TABLET(S): at 11:13

## 2018-10-03 RX ADMIN — Medication 20 MILLIGRAM(S): at 11:13

## 2018-10-03 RX ADMIN — Medication 1 TABLET(S): at 11:12

## 2018-10-03 RX ADMIN — FAMOTIDINE 20 MILLIGRAM(S): 10 INJECTION INTRAVENOUS at 18:06

## 2018-10-03 RX ADMIN — AMLODIPINE BESYLATE 10 MILLIGRAM(S): 2.5 TABLET ORAL at 22:25

## 2018-10-03 RX ADMIN — Medication 9 MILLIGRAM(S): at 22:25

## 2018-10-03 RX ADMIN — Medication 25 MICROGRAM(S): at 05:38

## 2018-10-03 RX ADMIN — Medication 200 MILLIGRAM(S): at 22:25

## 2018-10-03 RX ADMIN — FAMOTIDINE 20 MILLIGRAM(S): 10 INJECTION INTRAVENOUS at 05:38

## 2018-10-03 RX ADMIN — Medication 3 MILLILITER(S): at 07:47

## 2018-10-03 RX ADMIN — Medication 200 MILLIGRAM(S): at 05:38

## 2018-10-03 RX ADMIN — Medication 3 MILLILITER(S): at 13:31

## 2018-10-03 RX ADMIN — Medication 20 MILLIGRAM(S): at 05:38

## 2018-10-03 RX ADMIN — Medication 1 TABLET(S): at 18:06

## 2018-10-03 RX ADMIN — Medication 3 MILLILITER(S): at 01:30

## 2018-10-03 RX ADMIN — Medication 500 MILLIGRAM(S): at 05:38

## 2018-10-03 RX ADMIN — CLOPIDOGREL BISULFATE 75 MILLIGRAM(S): 75 TABLET, FILM COATED ORAL at 11:13

## 2018-10-03 RX ADMIN — Medication 500 MILLIGRAM(S): at 18:05

## 2018-10-03 RX ADMIN — SIMVASTATIN 5 MILLIGRAM(S): 20 TABLET, FILM COATED ORAL at 22:25

## 2018-10-03 NOTE — DISCHARGE NOTE ADULT - VISION (WITH CORRECTIVE LENSES IF THE PATIENT USUALLY WEARS THEM):
glasses needed. two pairs at bedside/Partially impaired: cannot see medication labels or newsprint, but can see obstacles in path, and the surrounding layout; can count fingers at arm's length

## 2018-10-03 NOTE — PROGRESS NOTE ADULT - NSHPATTENDINGPLANDISCUSS_GEN_ALL_CORE
pt, family at bedside translating for the patient

## 2018-10-03 NOTE — DISCHARGE NOTE ADULT - OTHER SIGNIFICANT FINDINGS
Lab Results:  CBC  CBC Full  -  ( 02 Oct 2018 05:52 )  WBC Count : 9.25 K/uL  Hemoglobin : 14.1 g/dL  Hematocrit : 45.0 %  Platelet Count - Automated : 208 K/uL  Mean Cell Volume : 97.2 fl  Mean Cell Hemoglobin : 30.5 pg  Mean Cell Hemoglobin Concentration : 31.3 gm/dL  Auto Neutrophil # : x  Auto Lymphocyte # : x  Auto Monocyte # : x  Auto Eosinophil # : x  Auto Basophil # : x  Auto Neutrophil % : x  Auto Lymphocyte % : x  Auto Monocyte % : x  Auto Eosinophil % : x  Auto Basophil % : x    .		Differential:	[] Automated		[] Manual  Chemistry                        14.1   9.25  )-----------( 208      ( 02 Oct 2018 05:52 )             45.0     10-02    144  |  108  |  38<H>  ----------------------------<  103<H>  4.1   |  30  |  0.81    Ca    8.7      02 Oct 2018 05:52  Phos  3.5     10-02  Mg     2.6     10-02      MICROBIOLOGY/CULTURES:  Culture Results:   No growth at 5 days. (09-27 @ 19:29)  Culture Results:   No growth at 5 days. (09-27 @ 19:29)  Culture Results:   No growth (09-27 @ 19:29)      RADIOLOGY & ADDITIONAL TESTS:    < from: Xray Chest 1 View- PORTABLE-Routine (09.30.18 @ 09:12) >    There is mild pulmonary vascular congestion. Heart size is moderately   enlarged. Thoracic spondylosis is noted.    Impression:    Mild pulmonary vascular congestion and cardiomegaly, grossly unchanged.      < end of copied text >  < from: Xray Chest 1 View AP/PA. (09.27.18 @ 19:10) >  The heart is enlarged. There is mild pulmonary vascular congestion.. The   osseous structures demonstrate no acute abnormality.         IMPRESSION:    Mild pulmonary vascular congestion.    < end of copied text >    < from: CT Chest No Cont (09.27.18 @ 20:25) >    IMPRESSION:      Scattered areas of groundglass opacities with peribronchial thickening   nonspecific in nature may represent small vessel small airway disease   however a infectious or inflammatory etiology cannot be excluded. No   segmental consolidations       < end of copied text >

## 2018-10-03 NOTE — DISCHARGE NOTE ADULT - MEDICATION SUMMARY - MEDICATIONS TO TAKE
I will START or STAY ON the medications listed below when I get home from the hospital:    predniSONE 10 mg oral tablet  -- 2 tab PO QD X 3 days  1 tab PO QD X 3 days MDD:2  tabs  -- It is very important that you take or use this exactly as directed.  Do not skip doses or discontinue unless directed by your doctor.  Obtain medical advice before taking any non-prescription drugs as some may affect the action of this medication.  Take with food or milk.    -- Indication: For Asthma    Ecotrin  -- 325 milligram(s) by mouth once a day  -- Indication: For CAD (coronary artery disease)    enalapril 10 mg oral tablet  -- 1 tab(s) by mouth once a day  -- Indication: For HTN (hypertension)    simvastatin 5 mg oral tablet  -- 1 tab(s) by mouth once a day (at bedtime)  -- Indication: For Hyperlipidemia    clopidogrel 75 mg oral tablet  -- 1 tab(s) by mouth once a day  -- Indication: For CAD (coronary artery disease)    Metoprolol Succinate  mg oral tablet, extended release  -- 1 tab(s) by mouth once a day (at bedtime)  -- Indication: For HTN (hypertension)    albuterol 90 mcg/inh inhalation aerosol  -- 2 puff(s) inhaled 4 times a day, As Needed -for shortness of breath and/or wheezing   -- For inhalation only.  It is very important that you take or use this exactly as directed.  Do not skip doses or discontinue unless directed by your doctor.  Obtain medical advice before taking any non-prescription drugs as some may affect the action of this medication.  Shake well before use.    -- Indication: For Asthma    amLODIPine 5 mg oral tablet  -- 1 tab(s) by mouth once a day (at bedtime)  -- Indication: For HTN (hypertension)    cefuroxime 500 mg oral tablet  -- 1 tab(s) by mouth every 12 hours  -- Indication: For pna    furosemide 20 mg oral tablet  -- 1 tab(s) by mouth once a day  -- Indication: For CHF    Zantac 150 oral tablet  -- 1 tab(s) by mouth 2 times a day  -- Indication: For GERD (gastroesophageal reflux disease)    Singulair 10 mg oral tablet  -- 1 tab(s) by mouth once a day  -- Indication: For Asthma    potassium chloride 10 mEq oral capsule, extended release  -- 1 cap(s) by mouth once a day  -- Indication: For potassium    melatonin 3 mg oral tablet  -- 3 tab(s) by mouth once a day (at bedtime)  -- Indication: For insomnia    Acidophilus oral capsule  -- 1 cap(s) by mouth 2 times a day   -- Indication: For to take with abx    Synthroid 25 mcg (0.025 mg) oral tablet  -- 1 tab(s) by mouth once a day  -- Indication: For Hypothyroidism    Calcium 600+D oral tablet  -- 1 tab(s) by mouth once a day  -- Indication: For vitamins

## 2018-10-03 NOTE — DISCHARGE NOTE ADULT - DURABLE MEDICAL EQUIPMENT AGENCY
HOME OXYGEN SERVICES ARRANGED THRU HEALTHCARE PARTNER's MEDICARE INSURANCE :   [facility] ([phone] EXT [phone])

## 2018-10-03 NOTE — DISCHARGE NOTE ADULT - CARE PROVIDERS DIRECT ADDRESSES
[email or web address],DirectAddress_Unknown ,DirectAddress_Unknown,DirectAddress_Unknown

## 2018-10-03 NOTE — DISCHARGE NOTE ADULT - CARE PLAN
Principal Discharge DX:	Hypoxia  Goal:	Acute asthma exacerbation and PNA  Assessment and plan of treatment:	Home o2 evaluation for diagnosis of asthma exacebration  while in a chronic and stable state, my patient is still hypoxemic to 87% on ambulation on RA, with 3 L of O2 supplementation pt O2 sats wnl. Patient will require 24 hour oxygen at home  -continue Albuterol, prednisone taper, Albuterol PRN and Ceftin; FU with Pulm Dr Ryan

## 2018-10-03 NOTE — DISCHARGE NOTE ADULT - PATIENT PORTAL LINK FT
You can access the [email or web address], offered by [facility], by registering with the following website: [email or web address]

## 2018-10-03 NOTE — DISCHARGE NOTE ADULT - HOME CARE AGENCY
[facility] services for visiting nurse assessment & services.  Requested start of care: 10/04/2018  Telephone: [phone] [facility] services for visiting nurse assessment & services.  Requested start of care: 10/06/2018  Telephone: [phone]

## 2018-10-03 NOTE — DISCHARGE NOTE ADULT - PROVIDER TOKENS
TOKEN:'[id number]',FREE:[LAST:[FU with PCP],PHONE:[(   )    -],FAX:[(   )    -]] FREE:[LAST:[FU with PCP],PHONE:[(   )    -],FAX:[(   )    -]],TOKEN:'[id number]'

## 2018-10-03 NOTE — DISCHARGE NOTE ADULT - HOSPITAL COURSE
Subjective:  Patient is a 87y old  Female who presents with a chief complaint of cough, dyspnea    HPI:  86 y/o female Zambian speaking, try to use   # [id number], but patient do not understand the Zambian  either , asking to wait for her daughter to speak to her. Most of the history taken from the chart review. Patient with a PMHx of CAD s/p stents x2 and Asthma presents to the  on 9/28/18 with c/o cough, not improving despite 1 week antibiotics, dyspnea on exertion, wheezing. Follow up with PMD today who advised pt to go to ED for low oxygen levels, wheezing, and coughing. Pt hypoxic with stat of 92% on room air.    Denies CP, palpitations, abdominal pain, afebrile, + productive cough.    In ED -T(F): 97.7, Max: 100 HR: 98  (69 - 98) BP: 148/99  (148/73 - 176/65) RR: 18  (17 - 19) SpO2: 98%  (93% - 98%) EKG - NSR 81, no ischemic changes , troponin 0.04,   CT chest - scattered opacities may represent small vessel disease infectious vs inflammatory (28 Sep 2018 09:50)    9/29/18 - Patient seen and examined at bedside earlier today, cough and dyspnea better, denies chest pain, afebrile, tolerates IV abx and steroids   9/30 - pt seen examined, reports productive cough , denies chest pain, dyspnea improving , O2 on ambulation low , events noted - confusion overnight    10/1: Above Reviewed. patient has no complaints. +cough  10/2:  Patient is hypoxic on RA on Ambulation; has no complaints  10/3: No overnight events; stable on oxygen    86 y/o female Zambian speaking, try to use   # [id number], but patient do not understand the Zambian  either , asking to wait for her daughter to speak to her. Most of the history taken from the chart review. Patient with a PMHx of CAD s/p stents x2 and Asthma presents to the HH with c/o cough, not improving despite 1 week antibiotics, dyspnea on exertion, wheezing. Follow up with PMD today who advised pt to go to ED for low oxygen levels, wheezing, and coughing. Pt hypoxic with stat of 92% on room air.      In ED -T(F): 97.7, Max: 100 HR: 98  (69 - 98) BP: 148/99  (148/73 - 176/65) RR: 18  (17 - 19) SpO2: 98%  (93% - 98%) EKG - NSR 81, no ischemic changes , troponin 0.04,   CT chest - scattered opacities may represent small vessel disease infectious vs inflammatory    s/p solumedrol 125 mg, s/p IV ceftriaxone and Azithromycin     * Acute hypoxemic respiratory failure due to   * Acute asthma exacerbation  * Suspected CAP   - tele   - RVP - Neg  - O2 supplementation, check O2 on ambulation  low , needs home O2   - Repeat Home o2 evaluation for diagnosis of asthma exacebration  while in a chronic and stable state, my patient is still hypoxemic to 87% on ambulation on RA, with 3 L of O2 supplementation pt O2 sats wnl. Patient will require 24 hour oxygen at home  - IV solumedrol 20mg QD switch to po yeny  - S/P ceftriaxone , Azithromycin switched to ceftin   - nebs, mucolytics  - pulmonology consult - take sputum culture, mycoplasma and legionella serology  - check d-dimers - negative     * Elevated troponin suspected demand ischemia from respiratory problems r/o ACS  * Elevated BNP suspected mild acute on chronic diastolic HF   - serial troponin and EKG  - c/w IV lasix Switch to PO yeny  - CXR 9/20 - still mild CHF   - LDL 66, A1C 6.1, TSH wnl  - 2 d echo - EF wnl   - cardiology consult appreciated  - c/w DAPT, BB, statins    * HTN  - c/w home meds CCB, BB, ACEi    * HLD  - c/w statins    * Hypothyroidism  - c/w levothyroxine   - c/w TSH wnl    * GERD  - c/w famotidine    * Confusion overnight suspected delirium in hospital setting, with underlying mild dementia, vs side effect of steroids  - s/p xanax 9/29  - will avoid benzo   - trial of melatonin qhs  - if agitated consider low dose of seroquel 12.5 or haldol IM 0.5 mg     DVT proph - IPC

## 2018-10-04 VITALS
TEMPERATURE: 99 F | DIASTOLIC BLOOD PRESSURE: 66 MMHG | SYSTOLIC BLOOD PRESSURE: 94 MMHG | RESPIRATION RATE: 19 BRPM | OXYGEN SATURATION: 90 % | HEART RATE: 60 BPM

## 2018-10-04 RX ADMIN — Medication 1 TABLET(S): at 11:17

## 2018-10-04 RX ADMIN — Medication 20 MILLIGRAM(S): at 05:16

## 2018-10-04 RX ADMIN — Medication 500 MILLIGRAM(S): at 05:16

## 2018-10-04 RX ADMIN — Medication 3 MILLILITER(S): at 02:31

## 2018-10-04 RX ADMIN — CLOPIDOGREL BISULFATE 75 MILLIGRAM(S): 75 TABLET, FILM COATED ORAL at 11:13

## 2018-10-04 RX ADMIN — Medication 3 MILLILITER(S): at 07:38

## 2018-10-04 RX ADMIN — Medication 1 TABLET(S): at 11:18

## 2018-10-04 RX ADMIN — Medication 25 MICROGRAM(S): at 05:15

## 2018-10-04 RX ADMIN — Medication 10 MILLIGRAM(S): at 05:16

## 2018-10-04 RX ADMIN — Medication 200 MILLIGRAM(S): at 05:16

## 2018-10-04 RX ADMIN — MONTELUKAST 10 MILLIGRAM(S): 4 TABLET, CHEWABLE ORAL at 11:18

## 2018-10-04 RX ADMIN — Medication 325 MILLIGRAM(S): at 11:18

## 2018-10-04 RX ADMIN — Medication 100 MILLIGRAM(S): at 05:16

## 2018-10-04 RX ADMIN — FAMOTIDINE 20 MILLIGRAM(S): 10 INJECTION INTRAVENOUS at 05:15

## 2018-10-04 NOTE — PROGRESS NOTE ADULT - REASON FOR ADMISSION
cough, dyspnea

## 2018-10-04 NOTE — PROGRESS NOTE ADULT - PROVIDER SPECIALTY LIST ADULT
Cardiology
Cardiology
Hospitalist
Pulmonology
Cardiology
Cardiology

## 2018-10-04 NOTE — PROGRESS NOTE ADULT - SUBJECTIVE AND OBJECTIVE BOX
Cardiology Progress Note  Patient is a 87y old  Female who presents with a chief complaint of cough, dyspnea.     HPI: 86 y/o obese female Icelandic speaking, with a PMHx of CAD s/p PCI of RCA and Asthma presents to the HH with c/o cough, not improving despite 1 week antibiotics, dyspnea on exertion, wheezing. Follow up with PMD today who advised pt to go to ED for low oxygen levels, wheezing, and coughing. Pt hypoxic with stat of 92% on room air.    Denies CP, palpitations, abdominal pain, afebrile, + productive cough.  In ED -T(F): 97.7, Max: 100 HR: 98  (69 - 98) BP: 148/99  (148/73 - 176/65) RR: 18  (17 - 19) SpO2: 98%  (93% - 98%) EKG - NSR 81, no ischemic changes , troponin 0.04,   CT chest - scattered opacities may represent small vessel disease infectious vs inflammatory (28 Sep 2018 09:50)    10/1- SR, LBBB on tele. Feels better today. No CP. SOB improved.     10/2- No events last pm. No CP/SOB. Feels well this AM. Eating breakfast in chair.    PAST MEDICAL & SURGICAL HISTORY:  GERD (gastroesophageal reflux disease)  Hyperlipidemia  HTN (hypertension)  Asthma  CAD (coronary artery disease)  No significant past surgical history    MEDICATIONS  (STANDING):  ALBUTerol/ipratropium for Nebulization 3 milliLiter(s) Nebulizer every 6 hours  amLODIPine   Tablet 5 milliGRAM(s) Oral at bedtime  aspirin enteric coated 325 milliGRAM(s) Oral daily  azithromycin  IVPB 500 milliGRAM(s) IV Intermittent every 24 hours  calcium carbonate 1250 mG  + Vitamin D (OsCal 500 + D) 1 Tablet(s) Oral daily  cefTRIAXone Injectable. 1000 milliGRAM(s) IV Push every 24 hours  clopidogrel Tablet 75 milliGRAM(s) Oral daily  enalapril 10 milliGRAM(s) Oral daily  famotidine    Tablet 20 milliGRAM(s) Oral two times a day  guaiFENesin    Syrup 200 milliGRAM(s) Oral every 8 hours  influenza   Vaccine 0.5 milliLiter(s) IntraMuscular once  lactobacillus acidophilus 1 Tablet(s) Oral two times a day with meals  levothyroxine 25 MICROGram(s) Oral daily  methylPREDNISolone sodium succinate Injectable 40 milliGRAM(s) IV Push every 8 hours  metoprolol succinate  milliGRAM(s) Oral daily  montelukast 10 milliGRAM(s) Oral daily  simvastatin 5 milliGRAM(s) Oral at bedtime    MEDICATIONS  (PRN):  aluminum hydroxide/magnesium hydroxide/simethicone Suspension 30 milliLiter(s) Oral every 4 hours PRN Dyspepsia  ondansetron Injectable 4 milliGRAM(s) IV Push every 6 hours PRN Nausea    FAMILY HISTORY: Family history of heart attack (Father)    SOCIAL HISTORY: no smoking or alcohol use in recent past.     REVIEW OF SYSTEMS:  CONSTITUTIONAL:    No fatigue, malaise, lethargy.  No fever or chills.  HEENT:  Eyes:  No visual changes.     ENT:  No epistaxis.  No sinus pain.    RESPIRATORY:  c/o cough.  No wheeze.  No hemoptysis.  c/o shortness of breath.  CARDIOVASCULAR:  No chest pains.  No palpitations. c/o shortness of breath, No orthopnea or PND.  GASTROINTESTINAL:  No abdominal pain.  No nausea or vomiting.    GENITOURINARY:    No hematuria.    MUSCULOSKELETAL:  No musculoskeletal pain.  No joint swelling.  No arthritis.  NEUROLOGICAL:  No tingling or numbness or weakness.    Vital Signs Last 24 Hrs  T(C): 36.7 (28 Sep 2018 11:52), Max: 37.8 (27 Sep 2018 19:46)  T(F): 98.1 (28 Sep 2018 11:52), Max: 100 (27 Sep 2018 19:46)  HR: 65 (28 Sep 2018 12:35) (65 - 98)  BP: 142/72 (28 Sep 2018 12:35) (142/72 - 185/69)  BP(mean): 96 (28 Sep 2018 02:28) (96 - 96)  RR: 22 (28 Sep 2018 11:52) (14 - 22)  SpO2: 98% (28 Sep 2018 11:52) (93% - 99%)    PHYSICAL EXAM-    Constitutional: The patient appears to be normal, well developed, well nourished and alert and oriented to time, place and person. The patient does not appear acutely ill.     Head: Head is normocephalic and atraumatic.      Neck: No jugular venous distention. No audible carotid bruits. There are strong carotid pulses bilaterally. No JVD.     Cardiovascular: Regular rate and rhythm without S3, S4. No murmurs or rubs are appreciated.      Respiratory: Breath sounds are normal. No rales. No wheezing.    Abdomen: Soft, nontender, nondistended with positive bowel sounds.      Extremity: No tenderness. No  pitting edema     Neurologic: The patient is alert and oriented.        INTERPRETATION OF TELEMETRY: sinus rythm    ECG: Sinus rythm , poor R wave progression.     I&O's Detail      LABS:                        13.7   7.94  )-----------( 233      ( 27 Sep 2018 19:29 )             41.2         142  |  109<H>  |  34<H>  ----------------------------<  106<H>  4.0   |  26  |  0.92    Ca    9.0      27 Sep 2018 19:29    TPro  7.3  /  Alb  3.5  /  TBili  0.2  /  DBili  x   /  AST  31  /  ALT  34  /  AlkPhos  88      CARDIAC MARKERS ( 28 Sep 2018 09:46 )  0.053 ng/mL / x     / 230 U/L / x     / x      CARDIAC MARKERS ( 27 Sep 2018 19:29 )  0.049 ng/mL / x     / x     / x     / x            Urinalysis Basic - ( 27 Sep 2018 19:29 )    Color: Yellow / Appearance: Clear / S.015 / pH: x  Gluc: x / Ketone: Negative  / Bili: Negative / Urobili: Negative mg/dL   Blood: x / Protein: 30 mg/dL / Nitrite: Negative   Leuk Esterase: Negative / RBC: 0-2 /HPF / WBC 0-2   Sq Epi: x / Non Sq Epi: Occasional / Bacteria: Occasional    I&O's Summary    BNPSerum Pro-Brain Natriuretic Peptide: 976 pg/mL ( @ 09:46)  Serum Pro-Brain Natriuretic Peptide: 455 pg/mL ( @ 19:29)    RADIOLOGY & ADDITIONAL STUDIES:  < from: CT Chest No Cont (18 @ 20:25) >    EXAM:  CT CHEST                            PROCEDURE DATE:  2018          INTERPRETATION:  Exam Date: 2018 8:25 PM  Clinical Information: Hypoxia, cough  Technique:       CT of the chest was performed with axial images obtained   from the thoracic to the inlet to the bilateral adrenal glands       without IV contrast  . Maximum intensity projection images were obtained.  Comparison:  None    FINDINGS:    Lungs, Airways and Pleura: Central tracheobronchial tree is grossly   patent. Noendobronchial lesions. Trace right pleural effusion.   Subsegmental atelectasis in the lingula and anterior left lower lobe.   Scattered areas of groundglass opacities with peribronchial thickening   nonspecific in nature may represent small vessel small airway disease   however a infectious or inflammatory etiology cannot be excluded. No   segmental consolidations.    Heart and Great Vessels: Atherosclerotic changes of the aorta and   coronary vasculature. Is mildly enlarged. No pericardial effusions.    Mediastinum and Roberta: within normal limits    Neck and Chest Wall: within normal limits    Bones: Degenerative changes and osteopenia. Nondisplaced anterior left   third through sixth rib fractures may be chronic in nature.     Upper Abdomen:  Gallstones. Mild elevation the right hemidiaphragm. Left   adrenal thickening. Small hiatal hernia. Subcentimeter hypodensities in   the liver too small to characterize.    IMPRESSION:      Scattered areas of groundglass opacities with peribronchial thickening   nonspecific in nature may represent small vessel small airway disease   however a infectious or inflammatory etiology cannot be excluded. No   segmental consolidations       < from: Transthoracic Echocardiogram (18 @ 09:08) >  Endocardium is not always well visualized, however, overall left   ventricular systolic function appears grossly normal. Technically   Difficult Study.   Estimated left ventricular ejection fraction is 55-60 %.   Fibrocalcific changes noted to the mitral valve leaflets with preserved   leaflet excursion. Mild mitral annular calcification is present.   Mild (1+) mitral regurgitation is present.   The aortic valve is not well visualized, appears minimally sclerotic.   Valve opening seems to be normal.    < end of copied text >
Patient is a 87y old  Female who presents with a chief complaint of cough, dyspnea (03 Oct 2018 12:42)  10/4- still with cough but denies CP or SOB       MEDICATIONS  (STANDING):  ALBUTerol/ipratropium for Nebulization 3 milliLiter(s) Nebulizer every 6 hours  amLODIPine   Tablet 10 milliGRAM(s) Oral at bedtime  aspirin enteric coated 325 milliGRAM(s) Oral daily  calcium carbonate 1250 mG  + Vitamin D (OsCal 500 + D) 1 Tablet(s) Oral daily  cefuroxime   Tablet 500 milliGRAM(s) Oral every 12 hours  clopidogrel Tablet 75 milliGRAM(s) Oral daily  enalapril 10 milliGRAM(s) Oral daily  famotidine    Tablet 20 milliGRAM(s) Oral two times a day  furosemide    Tablet 20 milliGRAM(s) Oral daily  guaiFENesin    Syrup 200 milliGRAM(s) Oral every 8 hours  influenza   Vaccine 0.5 milliLiter(s) IntraMuscular once  lactobacillus acidophilus 1 Tablet(s) Oral daily  lactobacillus acidophilus 1 Tablet(s) Oral two times a day with meals  levothyroxine 25 MICROGram(s) Oral daily  melatonin 9 milliGRAM(s) Oral at bedtime  metoprolol succinate  milliGRAM(s) Oral daily  montelukast 10 milliGRAM(s) Oral daily  predniSONE   Tablet 20 milliGRAM(s) Oral daily  simvastatin 5 milliGRAM(s) Oral at bedtime    MEDICATIONS  (PRN):  aluminum hydroxide/magnesium hydroxide/simethicone Suspension 30 milliLiter(s) Oral every 4 hours PRN Dyspepsia  ondansetron Injectable 4 milliGRAM(s) IV Push every 6 hours PRN Nausea  QUEtiapine 12.5 milliGRAM(s) Oral daily PRN aggitation            Vital Signs Last 24 Hrs  T(C): 36.4 (04 Oct 2018 04:44), Max: 36.8 (03 Oct 2018 17:26)  T(F): 97.5 (04 Oct 2018 04:44), Max: 98.2 (03 Oct 2018 17:26)  HR: 60 (04 Oct 2018 07:40) (58 - 92)  BP: 118/56 (04 Oct 2018 04:44) (108/85 - 140/49)  BP(mean): --  RR: 19 (03 Oct 2018 20:29) (18 - 19)  SpO2: 91% (04 Oct 2018 04:44) (91% - 96%)            INTERPRETATION OF TELEMETRY: brief episodes of PSVT    ECG:        LABS:                  I&O's Summary    BNP  RADIOLOGY & ADDITIONAL STUDIES:
Cardiology Progress Note  Patient is a 87y old  Female who presents with a chief complaint of cough, dyspnea.     HPI: 88 y/o obese female Beninese speaking, with a PMHx of CAD s/p PCI of RCA and Asthma presents to the HH with c/o cough, not improving despite 1 week antibiotics, dyspnea on exertion, wheezing. Follow up with PMD today who advised pt to go to ED for low oxygen levels, wheezing, and coughing. Pt hypoxic with stat of 92% on room air.    Denies CP, palpitations, abdominal pain, afebrile, + productive cough.  In ED -T(F): 97.7, Max: 100 HR: 98  (69 - 98) BP: 148/99  (148/73 - 176/65) RR: 18  (17 - 19) SpO2: 98%  (93% - 98%) EKG - NSR 81, no ischemic changes , troponin 0.04,   CT chest - scattered opacities may represent small vessel disease infectious vs inflammatory (28 Sep 2018 09:50)    10/1- SR, LBBB on tele. Feels better today. No CP. SOB improved.     PAST MEDICAL & SURGICAL HISTORY:  GERD (gastroesophageal reflux disease)  Hyperlipidemia  HTN (hypertension)  Asthma  CAD (coronary artery disease)  No significant past surgical history    MEDICATIONS  (STANDING):  ALBUTerol/ipratropium for Nebulization 3 milliLiter(s) Nebulizer every 6 hours  amLODIPine   Tablet 5 milliGRAM(s) Oral at bedtime  aspirin enteric coated 325 milliGRAM(s) Oral daily  azithromycin  IVPB 500 milliGRAM(s) IV Intermittent every 24 hours  calcium carbonate 1250 mG  + Vitamin D (OsCal 500 + D) 1 Tablet(s) Oral daily  cefTRIAXone Injectable. 1000 milliGRAM(s) IV Push every 24 hours  clopidogrel Tablet 75 milliGRAM(s) Oral daily  enalapril 10 milliGRAM(s) Oral daily  famotidine    Tablet 20 milliGRAM(s) Oral two times a day  guaiFENesin    Syrup 200 milliGRAM(s) Oral every 8 hours  influenza   Vaccine 0.5 milliLiter(s) IntraMuscular once  lactobacillus acidophilus 1 Tablet(s) Oral two times a day with meals  levothyroxine 25 MICROGram(s) Oral daily  methylPREDNISolone sodium succinate Injectable 40 milliGRAM(s) IV Push every 8 hours  metoprolol succinate  milliGRAM(s) Oral daily  montelukast 10 milliGRAM(s) Oral daily  simvastatin 5 milliGRAM(s) Oral at bedtime    MEDICATIONS  (PRN):  aluminum hydroxide/magnesium hydroxide/simethicone Suspension 30 milliLiter(s) Oral every 4 hours PRN Dyspepsia  ondansetron Injectable 4 milliGRAM(s) IV Push every 6 hours PRN Nausea    FAMILY HISTORY: Family history of heart attack (Father)    SOCIAL HISTORY: no smoking or alcohol use in recent past.     REVIEW OF SYSTEMS:  CONSTITUTIONAL:    No fatigue, malaise, lethargy.  No fever or chills.  HEENT:  Eyes:  No visual changes.     ENT:  No epistaxis.  No sinus pain.    RESPIRATORY:  c/o cough.  No wheeze.  No hemoptysis.  c/o shortness of breath.  CARDIOVASCULAR:  No chest pains.  No palpitations. c/o shortness of breath, No orthopnea or PND.  GASTROINTESTINAL:  No abdominal pain.  No nausea or vomiting.    GENITOURINARY:    No hematuria.    MUSCULOSKELETAL:  No musculoskeletal pain.  No joint swelling.  No arthritis.  NEUROLOGICAL:  No tingling or numbness or weakness.    Vital Signs Last 24 Hrs  T(C): 36.7 (28 Sep 2018 11:52), Max: 37.8 (27 Sep 2018 19:46)  T(F): 98.1 (28 Sep 2018 11:52), Max: 100 (27 Sep 2018 19:46)  HR: 65 (28 Sep 2018 12:35) (65 - 98)  BP: 142/72 (28 Sep 2018 12:35) (142/72 - 185/69)  BP(mean): 96 (28 Sep 2018 02:28) (96 - 96)  RR: 22 (28 Sep 2018 11:52) (14 - 22)  SpO2: 98% (28 Sep 2018 11:52) (93% - 99%)    PHYSICAL EXAM-    Constitutional: The patient appears to be normal, well developed, well nourished and alert and oriented to time, place and person. The patient does not appear acutely ill.     Head: Head is normocephalic and atraumatic.      Neck: No jugular venous distention. No audible carotid bruits. There are strong carotid pulses bilaterally. No JVD.     Cardiovascular: Regular rate and rhythm without S3, S4. No murmurs or rubs are appreciated.      Respiratory: Breath sounds are normal. No rales. No wheezing.    Abdomen: Soft, nontender, nondistended with positive bowel sounds.      Extremity: No tenderness. No  pitting edema     Neurologic: The patient is alert and oriented.        INTERPRETATION OF TELEMETRY: sinus rythm    ECG: Sinus rythm , poor R wave progression.     I&O's Detail      LABS:                        13.7   7.94  )-----------( 233      ( 27 Sep 2018 19:29 )             41.2         142  |  109<H>  |  34<H>  ----------------------------<  106<H>  4.0   |  26  |  0.92    Ca    9.0      27 Sep 2018 19:29    TPro  7.3  /  Alb  3.5  /  TBili  0.2  /  DBili  x   /  AST  31  /  ALT  34  /  AlkPhos  88      CARDIAC MARKERS ( 28 Sep 2018 09:46 )  0.053 ng/mL / x     / 230 U/L / x     / x      CARDIAC MARKERS ( 27 Sep 2018 19:29 )  0.049 ng/mL / x     / x     / x     / x            Urinalysis Basic - ( 27 Sep 2018 19:29 )    Color: Yellow / Appearance: Clear / S.015 / pH: x  Gluc: x / Ketone: Negative  / Bili: Negative / Urobili: Negative mg/dL   Blood: x / Protein: 30 mg/dL / Nitrite: Negative   Leuk Esterase: Negative / RBC: 0-2 /HPF / WBC 0-2   Sq Epi: x / Non Sq Epi: Occasional / Bacteria: Occasional    I&O's Summary    BNPSerum Pro-Brain Natriuretic Peptide: 976 pg/mL ( @ 09:46)  Serum Pro-Brain Natriuretic Peptide: 455 pg/mL ( @ 19:29)    RADIOLOGY & ADDITIONAL STUDIES:  < from: CT Chest No Cont (18 @ 20:25) >    EXAM:  CT CHEST                            PROCEDURE DATE:  2018          INTERPRETATION:  Exam Date: 2018 8:25 PM  Clinical Information: Hypoxia, cough  Technique:       CT of the chest was performed with axial images obtained   from the thoracic to the inlet to the bilateral adrenal glands       without IV contrast  . Maximum intensity projection images were obtained.  Comparison:  None    FINDINGS:    Lungs, Airways and Pleura: Central tracheobronchial tree is grossly   patent. Noendobronchial lesions. Trace right pleural effusion.   Subsegmental atelectasis in the lingula and anterior left lower lobe.   Scattered areas of groundglass opacities with peribronchial thickening   nonspecific in nature may represent small vessel small airway disease   however a infectious or inflammatory etiology cannot be excluded. No   segmental consolidations.    Heart and Great Vessels: Atherosclerotic changes of the aorta and   coronary vasculature. Is mildly enlarged. No pericardial effusions.    Mediastinum and Roberta: within normal limits    Neck and Chest Wall: within normal limits    Bones: Degenerative changes and osteopenia. Nondisplaced anterior left   third through sixth rib fractures may be chronic in nature.     Upper Abdomen:  Gallstones. Mild elevation the right hemidiaphragm. Left   adrenal thickening. Small hiatal hernia. Subcentimeter hypodensities in   the liver too small to characterize.    IMPRESSION:      Scattered areas of groundglass opacities with peribronchial thickening   nonspecific in nature may represent small vessel small airway disease   however a infectious or inflammatory etiology cannot be excluded. No   segmental consolidations       < from: Transthoracic Echocardiogram (18 @ 09:08) >  Endocardium is not always well visualized, however, overall left   ventricular systolic function appears grossly normal. Technically   Difficult Study.   Estimated left ventricular ejection fraction is 55-60 %.   Fibrocalcific changes noted to the mitral valve leaflets with preserved   leaflet excursion. Mild mitral annular calcification is present.   Mild (1+) mitral regurgitation is present.   The aortic valve is not well visualized, appears minimally sclerotic.   Valve opening seems to be normal.    < end of copied text >
Cardiology Progress Note  Patient is a 87y old  Female who presents with a chief complaint of cough, dyspnea.     HPI: 88 y/o obese female Wallisian speaking, with a PMHx of CAD s/p PCI of RCA and Asthma presents to the  with c/o cough, not improving despite 1 week antibiotics, dyspnea on exertion, wheezing. Follow up with PMD today who advised pt to go to ED for low oxygen levels, wheezing, and coughing. Pt hypoxic with stat of 92% on room air.    Denies CP, palpitations, abdominal pain, afebrile, + productive cough.  In ED -T(F): 97.7, Max: 100 HR: 98  (69 - 98) BP: 148/99  (148/73 - 176/65) RR: 18  (17 - 19) SpO2: 98%  (93% - 98%) EKG - NSR 81, no ischemic changes , troponin 0.04,   CT chest - scattered opacities may represent small vessel disease infectious vs inflammatory (28 Sep 2018 09:50)    10/1- SR, LBBB on tele. Feels better today. No CP. SOB improved.     10/2- No events last pm. No CP/SOB. Feels well this AM. Eating breakfast in chair.    10/3- No CP/SOB. No events last pm. SR on tele.     PAST MEDICAL & SURGICAL HISTORY:  GERD (gastroesophageal reflux disease)  Hyperlipidemia  HTN (hypertension)  Asthma  CAD (coronary artery disease)  No significant past surgical history    MEDICATIONS  (STANDING):  ALBUTerol/ipratropium for Nebulization 3 milliLiter(s) Nebulizer every 6 hours  amLODIPine   Tablet 5 milliGRAM(s) Oral at bedtime  aspirin enteric coated 325 milliGRAM(s) Oral daily  azithromycin  IVPB 500 milliGRAM(s) IV Intermittent every 24 hours  calcium carbonate 1250 mG  + Vitamin D (OsCal 500 + D) 1 Tablet(s) Oral daily  cefTRIAXone Injectable. 1000 milliGRAM(s) IV Push every 24 hours  clopidogrel Tablet 75 milliGRAM(s) Oral daily  enalapril 10 milliGRAM(s) Oral daily  famotidine    Tablet 20 milliGRAM(s) Oral two times a day  guaiFENesin    Syrup 200 milliGRAM(s) Oral every 8 hours  influenza   Vaccine 0.5 milliLiter(s) IntraMuscular once  lactobacillus acidophilus 1 Tablet(s) Oral two times a day with meals  levothyroxine 25 MICROGram(s) Oral daily  methylPREDNISolone sodium succinate Injectable 40 milliGRAM(s) IV Push every 8 hours  metoprolol succinate  milliGRAM(s) Oral daily  montelukast 10 milliGRAM(s) Oral daily  simvastatin 5 milliGRAM(s) Oral at bedtime    MEDICATIONS  (PRN):  aluminum hydroxide/magnesium hydroxide/simethicone Suspension 30 milliLiter(s) Oral every 4 hours PRN Dyspepsia  ondansetron Injectable 4 milliGRAM(s) IV Push every 6 hours PRN Nausea    FAMILY HISTORY: Family history of heart attack (Father)    SOCIAL HISTORY: no smoking or alcohol use in recent past.     REVIEW OF SYSTEMS:  CONSTITUTIONAL:    No fatigue, malaise, lethargy.  No fever or chills.  HEENT:  Eyes:  No visual changes.     ENT:  No epistaxis.  No sinus pain.    RESPIRATORY:  c/o cough.  No wheeze.  No hemoptysis.  c/o shortness of breath.  CARDIOVASCULAR:  No chest pains.  No palpitations. c/o shortness of breath, No orthopnea or PND.  GASTROINTESTINAL:  No abdominal pain.  No nausea or vomiting.    GENITOURINARY:    No hematuria.    MUSCULOSKELETAL:  No musculoskeletal pain.  No joint swelling.  No arthritis.  NEUROLOGICAL:  No tingling or numbness or weakness.    Vital Signs Last 24 Hrs  T(C): 36.7 (28 Sep 2018 11:52), Max: 37.8 (27 Sep 2018 19:46)  T(F): 98.1 (28 Sep 2018 11:52), Max: 100 (27 Sep 2018 19:46)  HR: 65 (28 Sep 2018 12:35) (65 - 98)  BP: 142/72 (28 Sep 2018 12:35) (142/72 - 185/69)  BP(mean): 96 (28 Sep 2018 02:28) (96 - 96)  RR: 22 (28 Sep 2018 11:52) (14 - 22)  SpO2: 98% (28 Sep 2018 11:52) (93% - 99%)    PHYSICAL EXAM-    Constitutional: The patient appears to be normal, well developed, well nourished and alert and oriented to time, place and person. The patient does not appear acutely ill.     Head: Head is normocephalic and atraumatic.      Neck: No jugular venous distention. No audible carotid bruits. There are strong carotid pulses bilaterally. No JVD.     Cardiovascular: Regular rate and rhythm without S3, S4. No murmurs or rubs are appreciated.      Respiratory: Breath sounds are normal. No rales. No wheezing.    Abdomen: Soft, nontender, nondistended with positive bowel sounds.      Extremity: No tenderness. No  pitting edema     Neurologic: The patient is alert and oriented.        INTERPRETATION OF TELEMETRY: sinus rythm    ECG: Sinus rythm , poor R wave progression.     I&O's Detail      LABS:                        13.7   7.94  )-----------( 233      ( 27 Sep 2018 19:29 )             41.2         142  |  109<H>  |  34<H>  ----------------------------<  106<H>  4.0   |  26  |  0.92    Ca    9.0      27 Sep 2018 19:29    TPro  7.3  /  Alb  3.5  /  TBili  0.2  /  DBili  x   /  AST  31  /  ALT  34  /  AlkPhos  88      CARDIAC MARKERS ( 28 Sep 2018 09:46 )  0.053 ng/mL / x     / 230 U/L / x     / x      CARDIAC MARKERS ( 27 Sep 2018 19:29 )  0.049 ng/mL / x     / x     / x     / x            Urinalysis Basic - ( 27 Sep 2018 19:29 )    Color: Yellow / Appearance: Clear / S.015 / pH: x  Gluc: x / Ketone: Negative  / Bili: Negative / Urobili: Negative mg/dL   Blood: x / Protein: 30 mg/dL / Nitrite: Negative   Leuk Esterase: Negative / RBC: 0-2 /HPF / WBC 0-2   Sq Epi: x / Non Sq Epi: Occasional / Bacteria: Occasional    I&O's Summary    BNPSerum Pro-Brain Natriuretic Peptide: 976 pg/mL ( @ 09:46)  Serum Pro-Brain Natriuretic Peptide: 455 pg/mL ( @ 19:29)    RADIOLOGY & ADDITIONAL STUDIES:  < from: CT Chest No Cont (18 @ 20:25) >    EXAM:  CT CHEST                            PROCEDURE DATE:  2018          INTERPRETATION:  Exam Date: 2018 8:25 PM  Clinical Information: Hypoxia, cough  Technique:       CT of the chest was performed with axial images obtained   from the thoracic to the inlet to the bilateral adrenal glands       without IV contrast  . Maximum intensity projection images were obtained.  Comparison:  None    FINDINGS:    Lungs, Airways and Pleura: Central tracheobronchial tree is grossly   patent. Noendobronchial lesions. Trace right pleural effusion.   Subsegmental atelectasis in the lingula and anterior left lower lobe.   Scattered areas of groundglass opacities with peribronchial thickening   nonspecific in nature may represent small vessel small airway disease   however a infectious or inflammatory etiology cannot be excluded. No   segmental consolidations.    Heart and Great Vessels: Atherosclerotic changes of the aorta and   coronary vasculature. Is mildly enlarged. No pericardial effusions.    Mediastinum and Roberta: within normal limits    Neck and Chest Wall: within normal limits    Bones: Degenerative changes and osteopenia. Nondisplaced anterior left   third through sixth rib fractures may be chronic in nature.     Upper Abdomen:  Gallstones. Mild elevation the right hemidiaphragm. Left   adrenal thickening. Small hiatal hernia. Subcentimeter hypodensities in   the liver too small to characterize.    IMPRESSION:      Scattered areas of groundglass opacities with peribronchial thickening   nonspecific in nature may represent small vessel small airway disease   however a infectious or inflammatory etiology cannot be excluded. No   segmental consolidations       < from: Transthoracic Echocardiogram (18 @ 09:08) >  Endocardium is not always well visualized, however, overall left   ventricular systolic function appears grossly normal. Technically   Difficult Study.   Estimated left ventricular ejection fraction is 55-60 %.   Fibrocalcific changes noted to the mitral valve leaflets with preserved   leaflet excursion. Mild mitral annular calcification is present.   Mild (1+) mitral regurgitation is present.   The aortic valve is not well visualized, appears minimally sclerotic.   Valve opening seems to be normal.    < end of copied text >
MIKE ROPER  MRN: [id number]    S: 9/29: Chart reviewed. Patient feels better today. Less cough. No wheezing. Family at bedside to translate as patient speaks only Gabonese.     PAST MEDICAL & SURGICAL HISTORY:  GERD (gastroesophageal reflux disease)  Hyperlipidemia  HTN (hypertension)  Asthma  CAD (coronary artery disease)  No significant past surgical history      O: T(C): 36.8 (09-29-18 @ 10:32), Max: 37.2 (09-28-18 @ 17:42)  HR: 50 (09-29-18 @ 10:32) (50 - 72)  BP: 137/47 (09-29-18 @ 10:32) (137/47 - 163/74)  RR: 18 (09-29-18 @ 10:32) (18 - 20)  SpO2: 96% (09-29-18 @ 10:32) (95% - 96%)  Wt(kg): --    PHYSICAL EXAM:      GENERAL: Comfortable.    NEURO: awake and alert    NECK: no JVD noted    CHEST: clear    CARDIAC: RR    EXT: no edema                              14.6   15.01 )-----------( 249      ( 29 Sep 2018 07:19 )             44.3       09-29    143  |  110<H>  |  25<H>  ----------------------------<  186<H>  4.2   |  27  |  0.84    Ca    8.4<L>      29 Sep 2018 07:19  Phos  3.1     09-29  Mg     2.4     09-29    TPro  7.3  /  Alb  3.5  /  TBili  0.2  /  DBili  x   /  AST  31  /  ALT  34  /  AlkPhos  88  09-27    Rapid RVP Result: NotDete: The [employer] RVP Rapid uses polymerase chain reaction (PCR) and melt  curve analysis to screen for adenovirus; coronavirus HKU1, NL63, 229E,  OC43; human metapneumovirus (hMPV); human enterovirus/rhinovirus  (Entero/RV); influenza A; influenza A/H1;influenza A/H3; influenza  A/H1-2009; influenza B; parainfluenza viruses 1, 2, 3, 4; respiratory  syncytial virus; Bordetella pertussis; Mycoplasma pneumoniae; and  Chlamydophila pneumoniae. (09.27.18 @ 19:47)    RADIOLOGY:    EXAM:  CT CHEST                          PROCEDURE DATE:  09/27/2018      INTERPRETATION:  Exam Date: 9/27/2018 8:25 PM  Clinical Information: Hypoxia, cough  Technique:       CT of the chest was performed with axial images obtained   from the thoracic to the inlet to the bilateral adrenal glands       without IV contrast  . Maximum intensity projection images were obtained.  Comparison:  None    FINDINGS:    Lungs, Airways and Pleura: Central tracheobronchial tree is grossly   patent. Noendobronchial lesions. Trace right pleural effusion.   Subsegmental atelectasis in the lingula and anterior left lower lobe.   Scattered areas of groundglass opacities with peribronchial thickening   nonspecific in nature may represent small vessel small airway disease   however a infectious or inflammatory etiology cannot be excluded. No   segmental consolidations.    Heart and Great Vessels: Atherosclerotic changes of the aorta and   coronary vasculature. Is mildly enlarged. No pericardial effusions.    Mediastinum and Roberta: within normal limits    Neck and Chest Wall: within normal limits    Bones: Degenerative changes and osteopenia. Nondisplaced anterior left   third through sixth rib fractures may be chronic in nature.     Upper Abdomen:  Gallstones. Mild elevation the right hemidiaphragm. Left   adrenal thickening. Small hiatal hernia. Subcentimeter hypodensities in   the liver too small to characterize.    IMPRESSION:      Scattered areas of groundglass opacities with peribronchial thickening   nonspecific in nature may represent small vessel small airway disease   however a infectious or inflammatory etiology cannot be excluded. No   segmental consolidations       BROOKS FREED M.D., ATTENDING RADIOLOGIST          MEDICATIONS  (STANDING):  ALBUTerol/ipratropium for Nebulization 3 milliLiter(s) Nebulizer every 6 hours  amLODIPine   Tablet 5 milliGRAM(s) Oral at bedtime  aspirin enteric coated 325 milliGRAM(s) Oral daily  azithromycin  IVPB 500 milliGRAM(s) IV Intermittent every 24 hours  calcium carbonate 1250 mG  + Vitamin D (OsCal 500 + D) 1 Tablet(s) Oral daily  cefTRIAXone Injectable. 1000 milliGRAM(s) IV Push every 24 hours  clopidogrel Tablet 75 milliGRAM(s) Oral daily  enalapril 10 milliGRAM(s) Oral daily  famotidine    Tablet 20 milliGRAM(s) Oral two times a day  furosemide   Injectable 20 milliGRAM(s) IV Push once  guaiFENesin    Syrup 200 milliGRAM(s) Oral every 8 hours  influenza   Vaccine 0.5 milliLiter(s) IntraMuscular once  lactobacillus acidophilus 1 Tablet(s) Oral two times a day with meals  levothyroxine 25 MICROGram(s) Oral daily  methylPREDNISolone sodium succinate Injectable 40 milliGRAM(s) IV Push every 12 hours  metoprolol succinate  milliGRAM(s) Oral daily  montelukast 10 milliGRAM(s) Oral daily  simvastatin 5 milliGRAM(s) Oral at bedtime    MEDICATIONS  (PRN):  aluminum hydroxide/magnesium hydroxide/simethicone Suspension 30 milliLiter(s) Oral every 4 hours PRN Dyspepsia  ondansetron Injectable 4 milliGRAM(s) IV Push every 6 hours PRN Nausea        A/P: 1. Asthma exacerbation. Continue steroids, nebulized bronchodilators, Rocephin/Zithromax. O2 supplement prn.     2. Mild vascular congestion / CHF. Lasix prn. Follow up CXR, ECHO.
MIKE ROPER  MRN: [id number]    S: 9/29: Chart reviewed. Patient feels better today. Less cough. No wheezing. Family at bedside to translate as patient speaks only Czech.    9/30: Events of last PM noted - patient became agitated / confused. Currently she is sitting up in a chair breathing comfortably - not confused. GFamly at bedside serving as . Patient admits to having a slight cough. Denies shortness of breath.      PAST MEDICAL & SURGICAL HISTORY:  GERD (gastroesophageal reflux disease)  Hyperlipidemia  HTN (hypertension)  Asthma  CAD (coronary artery disease)  No significant past surgical history      O: T(C): 36.4 (09-30-18 @ 10:48), Max: 36.9 (09-29-18 @ 20:22)  HR: 58 (09-30-18 @ 10:48) (58 - 80)  BP: 151/51 (09-30-18 @ 10:48) (140/51 - 154/65)  RR: 18 (09-30-18 @ 10:48) (18 - 18)  SpO2: 91% (09-30-18 @ 10:48) (91% - 96%)  Wt(kg): --    PHYSICAL EXAM:      GENERAL: comfortable    NEURO: awake and alert    NECK: no JVD    CHEST: minimal basilar crackles; NO wheezing.     CARDIAC: RR    EXT: no edema      LABS:                        14.4   14.75 )-----------( 238      ( 30 Sep 2018 07:57 )             44.1       09-30    141  |  107  |  34<H>  ----------------------------<  172<H>  4.3   |  27  |  0.98    Ca    8.0<L>      30 Sep 2018 07:57  Phos  3.1     09-29  Mg     2.4     09-29        EXAM:  2D ECHOCARDIOGRAM AD    PROCEDURE DATE:  09/29/2018      INTERPRETATION:  Transthoracic Echocardiography Report (TTE)     Demographics     Patient name        JUDSON MCKEON      Age           87 year(s)     Med Rec #           [id number]         Gender        Female     Account #           [id number]           Date of Birth 04/12/1931     Interpreting        Konrad Rich,   Room Number   0308   Physician           MD     Referring Physician yue carrillo Sonographer   Yoseph Edmonds                                                       [facility]     Date of study       09/29/2018 08:47                       AM     Height              64.02 in          Weight        149.92 pounds    Type of Study:     TTE procedure: 2D echocardiogram AD     BP: 159/69 mmHg     [facility]    Indications   1) R06.00 - Dyspnea    M-Mode Measurements (cm)     LVEDd: 4.45 cm            LVESd: 2.98 cm   IVSEd: 1.3 cm   LVPWd: 1.03 cm            AO Root Dimension: 3.2 cm                             ACS: 1.6 cm                             LA: 4.2 cm    Doppler Measurements:     AV Velocity:144 cm/s                MV Peak E-Wave: 114 cm/s   AV Peak Gradient: 8.29 mmHg         MV Peak A-Wave: 145cm/s                                       MV E/A Ratio: 0.79 %   TR Velocity:190 cm/s                MV Peak Gradient: 5.2 mmHg   TR Gradient:14.44 mmHg   Estimated RAP:5 mmHg   RVSP:19 mmHg     Findings     Mitral Valve   Fibrocalcific changes noted to the mitral valve leaflets with preserved   leaflet excursion. Mild mitral annular calcification is present.   Mild (1+) mitral regurgitation is present.     Aortic Valve   The aortic valve is not well visualized, appears minimally sclerotic.   Valve opening seems to be normal.     Tricuspid Valve   Normal appearing tricuspid valve structure and function.   Trace tricuspid valve regurgitation is present.     Pulmonic Valve   Normal appearing pulmonic valve structure and function.   Trace pulmonic valvular regurgitation is present.     Left Atrium   The left atrium is mildly dilated.     Left Ventricle   Endocardium is not always well visualized, however, overall left   ventricular systolic function appears grossly normal. Technically   Difficult Study.   Estimated left ventricular ejection fraction is 55-60 %.     Right Atrium   Normal appearing right atrium.     Right Ventricle   Normal appearing right ventricle structure and function.     Pericardial Effusion   No evidence of pericardial effusion.     Pleural Effusion   No evidence of pleural effusion.     Miscellaneous   All visualized extra cardiac structures appears to be normal.     Impression     Summary     Endocardium is not always well visualized, however, overall left   ventricular systolic function appears grossly normal. Technically   Difficult Study.   Estimated left ventricular ejection fraction is 55-60 %.   Fibrocalcific changes noted to the mitral valve leaflets with preserved   leaflet excursion. Mild mitral annular calcification is present.   Mild (1+) mitral regurgitation is present.   The aortic valve is not well visualized, appears minimally sclerotic.   Valve opening seems to be normal.     Signature     ----------------------------------------------------------------   Electronically signed by Konrad Rich MD(Interpreting   physician) on 09/29/2018 03:25 PM   ----------------------------------------------------------------    Valves     Mitral Valve     Peak E-Wave: 114 cm/s   Peak A-Wave: 145 cm/s   Peak Gradient: 5.2 mmHg                                                E/A Ratio: 0.79     Aortic Valve     Peak Velocity: 144 cm/s   Peak Gradient: 8.29 mmHg     Cusp Separation: 1.6 cm     Tricuspid Valve     TR Velocity: 190 cm/s     Estimated RAP: 5 mmHg   TR Gradient: 14.44 mmHg              Estimated RVSP: 19 mmHg     Pulmonic Valve              Estimated PASP: 19.44 mmHg    Structures     Left Atrium     LA Dimension: 4.2 cm          LA Area: 13.2 cm^2   LA/Aorta: 1.31             LA Volume/Index: 51 ml /29m^2     Left Ventricle     Diastolic Dimension: 4.45 cm          Systolic Dimension: 2.98 cm   Septum Diastolic: 1.3 cm   PW Diastolic: 1.03 cm     FS: 33.03 %     Right Atrium     RA Systolic Pressure: 5 mmHg     Right Ventricle              RV Systolic Pressure: 19.44 mmHg     Miscellaneous     Aorta     Aortic Root: 3.2 cm        KONRAD RICH   This document has been electronically signed. Sep 29 2018  3:25PM        RADIOLOGY:    EXAM:  XR CHEST PORTABLE ROUTINE 1V                            PROCEDURE DATE:  09/30/2018          INTERPRETATION:  History: Congestive heart failure.    Single view of the chest was performed.    Comparison is made to September 27, 2018.    Findings:    There is mild pulmonary vascular congestion. Heart size is moderately   enlarged. Thoracic spondylosis is noted.    Impression:    Mild pulmonary vascular congestion and cardiomegaly, grossly unchanged.      SRUTHI SANCHEZ           MEDICATIONS  (STANDING):  ALBUTerol/ipratropium for Nebulization 3 milliLiter(s) Nebulizer every 6 hours  amLODIPine   Tablet 5 milliGRAM(s) Oral at bedtime  aspirin enteric coated 325 milliGRAM(s) Oral daily  azithromycin  IVPB 500 milliGRAM(s) IV Intermittent every 24 hours  calcium carbonate 1250 mG  + Vitamin D (OsCal 500 + D) 1 Tablet(s) Oral daily  cefTRIAXone Injectable. 1000 milliGRAM(s) IV Push every 24 hours  clopidogrel Tablet 75 milliGRAM(s) Oral daily  enalapril 10 milliGRAM(s) Oral daily  famotidine    Tablet 20 milliGRAM(s) Oral two times a day  furosemide   Injectable 20 milliGRAM(s) IV Push once  guaiFENesin    Syrup 200 milliGRAM(s) Oral every 8 hours  influenza   Vaccine 0.5 milliLiter(s) IntraMuscular once  lactobacillus acidophilus 1 Tablet(s) Oral two times a day with meals  levothyroxine 25 MICROGram(s) Oral daily  methylPREDNISolone sodium succinate Injectable 40 milliGRAM(s) IV Push every 12 hours  metoprolol succinate  milliGRAM(s) Oral daily  montelukast 10 milliGRAM(s) Oral daily  simvastatin 5 milliGRAM(s) Oral at bedtime    MEDICATIONS  (PRN):  aluminum hydroxide/magnesium hydroxide/simethicone Suspension 30 milliLiter(s) Oral every 4 hours PRN Dyspepsia  ondansetron Injectable 4 milliGRAM(s) IV Push every 6 hours PRN Nausea        A/P:  1. Asthma exacerbation. Continue nebulized bronchodilators, Rocephin/Zithromax. O2 supplement prn. Decrease steroids as this is likely to be a contributing factor to altered mental status last PM. Patient is no longer wheezing.     2. Mild vascular congestion / CHF. Continue Lasix. ECHO result noted; probable diastolic dysfunction.     3. Altered mental status. Episode of confusion/disorientation last PM has resolved this morning. May be related to systemic steroids - d/w Dr. Carrillo. Agree with decreasing steroids. Further evaluation and treatment depending on clinical course.
SUBJECTIVE     Patient is feeling better with the cough and sob on nasal canula now     PAST MEDICAL & SURGICAL HISTORY:  GERD (gastroesophageal reflux disease)  Hyperlipidemia  HTN (hypertension)  Asthma  CAD (coronary artery disease)  No significant past surgical history    OBJECTIVE   Vital Signs Last 24 Hrs  T(C): 36.4 (02 Oct 2018 04:37), Max: 36.6 (01 Oct 2018 10:40)  T(F): 97.5 (02 Oct 2018 04:37), Max: 97.9 (01 Oct 2018 10:40)  HR: 58 (02 Oct 2018 07:52) (47 - 64)  BP: 122/52 (02 Oct 2018 04:37) (122/52 - 148/52)  BP(mean): --  RR: 18 (02 Oct 2018 04:37) (18 - 18)  SpO2: 95% (02 Oct 2018 04:37) (95% - 98%)    PHYSICAL EXAM:  Constitutional: , awake and alert, not in distress.  HEENT: Normo cephalic atraumatic  Neck: Soft and supple, No J.V.D   Respiratory: vesicular breathing transmitted sounds with few occasional rales over the bases   Cardiovascular: S1 and S2, regular rate .   Gastrointestinal:  soft, nontender,   Extremities: No  edema or calf tenderness .  Neurological: No new  focal deficits.    MEDICATIONS  (STANDING):  ALBUTerol/ipratropium for Nebulization 3 milliLiter(s) Nebulizer every 6 hours  amLODIPine   Tablet 10 milliGRAM(s) Oral at bedtime  aspirin enteric coated 325 milliGRAM(s) Oral daily  calcium carbonate 1250 mG  + Vitamin D (OsCal 500 + D) 1 Tablet(s) Oral daily  cefTRIAXone Injectable. 1000 milliGRAM(s) IV Push every 24 hours  clopidogrel Tablet 75 milliGRAM(s) Oral daily  enalapril 10 milliGRAM(s) Oral daily  famotidine    Tablet 20 milliGRAM(s) Oral two times a day  furosemide   Injectable 20 milliGRAM(s) IV Push daily  guaiFENesin    Syrup 200 milliGRAM(s) Oral every 8 hours  influenza   Vaccine 0.5 milliLiter(s) IntraMuscular once  lactobacillus acidophilus 1 Tablet(s) Oral two times a day with meals  levothyroxine 25 MICROGram(s) Oral daily  melatonin 9 milliGRAM(s) Oral at bedtime  methylPREDNISolone sodium succinate Injectable 20 milliGRAM(s) IV Push daily  metoprolol succinate  milliGRAM(s) Oral daily  montelukast 10 milliGRAM(s) Oral daily  simvastatin 5 milliGRAM(s) Oral at bedtime                            14.1   9.25  )-----------( 208      ( 02 Oct 2018 05:52 )             45.0     10-02    144  |  108  |  38<H>  ----------------------------<  103<H>  4.1   |  30  |  0.81    Ca    8.7      02 Oct 2018 05:52  Phos  3.5     10-02  Mg     2.6     10-02
Subjective:  "I feel better today Doctor"  Daughters not at bedside    Review of Systems:  All 10 systems reviewed in detailed and found to be negative with the exception of what has already been described above    Allergies:  No Known Allergies    Meds  MEDICATIONS  (STANDING):  ALBUTerol/ipratropium for Nebulization 3 milliLiter(s) Nebulizer every 6 hours  amLODIPine   Tablet 10 milliGRAM(s) Oral at bedtime  aspirin enteric coated 325 milliGRAM(s) Oral daily  calcium carbonate 1250 mG  + Vitamin D (OsCal 500 + D) 1 Tablet(s) Oral daily  cefuroxime   Tablet 500 milliGRAM(s) Oral every 12 hours  clopidogrel Tablet 75 milliGRAM(s) Oral daily  enalapril 10 milliGRAM(s) Oral daily  famotidine    Tablet 20 milliGRAM(s) Oral two times a day  furosemide    Tablet 20 milliGRAM(s) Oral daily  guaiFENesin    Syrup 200 milliGRAM(s) Oral every 8 hours  influenza   Vaccine 0.5 milliLiter(s) IntraMuscular once  lactobacillus acidophilus 1 Tablet(s) Oral daily  lactobacillus acidophilus 1 Tablet(s) Oral two times a day with meals  levothyroxine 25 MICROGram(s) Oral daily  melatonin 9 milliGRAM(s) Oral at bedtime  metoprolol succinate  milliGRAM(s) Oral daily  montelukast 10 milliGRAM(s) Oral daily  predniSONE   Tablet 20 milliGRAM(s) Oral daily  simvastatin 5 milliGRAM(s) Oral at bedtime    MEDICATIONS  (PRN):  aluminum hydroxide/magnesium hydroxide/simethicone Suspension 30 milliLiter(s) Oral every 4 hours PRN Dyspepsia  ondansetron Injectable 4 milliGRAM(s) IV Push every 6 hours PRN Nausea  QUEtiapine 12.5 milliGRAM(s) Oral daily PRN aggitation    Physical Exam  Gen: Alert, oriented, no distress  HEENT: Anicteric sclera, moist mucous membranes, no JVD, no lymphadenopathy, good dentition  Cardio: Regular rhythm and rate, normal S1S2, no murmurs  Resp: Clear to auscultation bilaterally, no wheezing or rhonchi  GI: Nontender, nondistended, normoactive bowel sounds  Ext: No cyanosis, clubbing or edema  Neuro: Nonfocal    Labs:                        14.1   9.25  )-----------( 208      ( 02 Oct 2018 05:52 )             45.0     10-02    144  |  108  |  38<H>  ----------------------------<  103<H>  4.1   |  30  |  0.81    Ca    8.7      02 Oct 2018 05:52  Phos  3.5     10-02  Mg     2.6     10-02    There is mild pulmonary vascular congestion. Heart size is moderately   enlarged. Thoracic spondylosis is noted.    Impression:    Mild pulmonary vascular congestion and cardiomegaly, grossly unchanged.      Type of Study:     TTE procedure: 2D echocardiogram AD     BP: 159/69 mmHg     Study Location: [address] [place], [place] [address]ical Quality: Technically Difficullt    Indications   1) R06.00 - Dyspnea    M-Mode Measurements (cm)     LVEDd: 4.45 cm            LVESd: 2.98 cm   IVSEd: 1.3 cm   LVPWd: 1.03 cm            AO Root Dimension: 3.2 cm                             ACS: 1.6 cm                             LA: 4.2 cm    Doppler Measurements:     AV Velocity:144 cm/s                MV Peak E-Wave: 114 cm/s   AV Peak Gradient: 8.29 mmHg         MV Peak A-Wave: 145cm/s                                       MV E/A Ratio: 0.79 %   TR Velocity:190 cm/s                MV Peak Gradient: 5.2 mmHg   TR Gradient:14.44 mmHg   Estimated RAP:5 mmHg   RVSP:19 mmHg     Findings     Mitral Valve   Fibrocalcific changes noted to the mitral valve leaflets with preserved   leaflet excursion. Mild mitral annular calcification is present.   Mild (1+) mitral regurgitation is present.     Aortic Valve   The aortic valve is not well visualized, appears minimally sclerotic.   Valve opening seems to be normal.     Tricuspid Valve   Normal appearing tricuspid valve structure and function.   Trace tricuspid valve regurgitation is present.     Pulmonic Valve   Normal appearing pulmonic valve structure and function.   Trace pulmonic valvular regurgitation is present.     Left Atrium   The left atrium is mildly dilated.     Left Ventricle   Endocardium is not always well visualized, however, overall left   ventricular systolic function appears grossly normal. Technically   Difficult Study.   Estimated left ventricular ejection fraction is 55-60 %.     Right Atrium   Normal appearing right atrium.     Right Ventricle   Normal appearing right ventricle structure and function.     Pericardial Effusion   No evidence of pericardial effusion.     Pleural Effusion   No evidence of pleural effusion.     Miscellaneous   All visualized extra cardiac structures appears to be normal.     Impression     Summary     Endocardium is not always well visualized, however, overall left   ventricular systolic function appears grossly normal. Technically   Difficult Study.   Estimated left ventricular ejection fraction is 55-60 %.   Fibrocalcific changes noted to the mitral valve leaflets with preserved   leaflet excursion. Mild mitral annular calcification is present.   Mild (1+) mitral regurgitation is present.   The aortic valve is not well visualized, appears minimally sclerotic.   Valve opening seems to be normal.     Signature     ----------------------------------------------------------------   Electronically signed by Donny Haines MD(Interpreting   physician) on 09/29/2018 03:25 PM   ----------------------------------------------------------------    Valves     Mitral Valve     Peak E-Wave: 114 cm/s   Peak A-Wave: 145 cm/s   Peak Gradient: 5.2 mmHg                                                E/A Ratio: 0.79     Aortic Valve     Peak Velocity: 144 cm/s   Peak Gradient: 8.29 mmHg     Cusp Separation: 1.6 cm     Tricuspid Valve     TR Velocity: 190 cm/s     Estimated RAP: 5 mmHg   TR Gradient: 14.44 mmHg              Estimated RVSP: 19 mmHg     Pulmonic Valve              Estimated PASP: 19.44 mmHg    Structures     Left Atrium     LA Dimension: 4.2 cm          LA Area: 13.2 cm^2   LA/Aorta: 1.31             LA Volume/Index: 51 ml /29m^2     Left Ventricle     Diastolic Dimension: 4.45 cm          Systolic Dimension: 2.98 cm   Septum Diastolic: 1.3 cm   PW Diastolic: 1.03 cm     FS: 33.03 %     Right Atrium     RA Systolic Pressure: 5 mmHg     Right Ventricle              RV Systolic Pressure: 19.44 mmHg     Miscellaneous     Aorta     Aortic Root: 3.2 cm
Subjective:  Patient is a 87y old  Female who presents with a chief complaint of cough, dyspnea    HPI:      88 y/o female Czech speaking, try to use   # [id number], but patient do not understand the Czech  either , asking to wait for her daughter to speak to her. Most of the history taken from the chart review. Patient with a PMHx of CAD s/p stents x2 and Asthma presents to the  on 9/28/18 with c/o cough, not improving despite 1 week antibiotics, dyspnea on exertion, wheezing. Follow up with PMD today who advised pt to go to ED for low oxygen levels, wheezing, and coughing. Pt hypoxic with stat of 92% on room air.    Denies CP, palpitations, abdominal pain, afebrile, + productive cough.    In ED -T(F): 97.7, Max: 100 HR: 98  (69 - 98) BP: 148/99  (148/73 - 176/65) RR: 18  (17 - 19) SpO2: 98%  (93% - 98%) EKG - NSR 81, no ischemic changes , troponin 0.04,   CT chest - scattered opacities may represent small vessel disease infectious vs inflammatory (28 Sep 2018 09:50)    9/29/18 - Patient seen and examined at bedside earlier today, cough and dyspnea better, denies chest pain, afebrile, tolerates IV abx and steroids   9/30 - pt seen examined, reports productive cough , denies chest pain, dyspnea improving , O2 on ambulation low , events noted - confusion overnight    10/1: Above Reviewed. patient has no complaints. +cough    Review of system- Rest of the review of system are negative except mentioned in HPI    Vital Signs Last 24 Hrs  T(C): 36.3 (01 Oct 2018 21:14), Max: 36.8 (01 Oct 2018 05:05)  T(F): 97.4 (01 Oct 2018 21:14), Max: 98.2 (01 Oct 2018 05:05)  HR: 62 (01 Oct 2018 21:14) (54 - 89)  BP: 143/52 (01 Oct 2018 21:14) (134/51 - 148/52)  BP(mean): --  RR: 18 (01 Oct 2018 21:14) (18 - 18)  SpO2: 95% (01 Oct 2018 21:14) (95% - 98%)      PHYSICAL EXAM:  GENERAL: NAD  NERVOUS SYSTEM:  Alert & Oriented X3, non- focal exam, Motor Strength 5/5 B/L upper and lower extremities; DTRs 2+ intact and symmetric  HEAD:  Atraumatic, Normocephalic  EYES: EOMI, PERRLA, conjunctiva and sclera clear  HEENT: Moist mucous membranes  NECK: Supple, No JVD  CHEST/LUNG: BS decreased at bases , +  rales, no rhonchi, occasional wheezing  HEART: Regular rate and rhythm; No murmurs, rubs, or gallops  ABDOMEN: Soft, Nontender, Nondistended; Bowel sounds present  GENITOURINARY- Voiding, no suprapubic tenderness  EXTREMITIES:  2+ Peripheral Pulses, No clubbing, cyanosis, or edema  MUSCULOSKELETAL:- No muscle tenderness, Muscle tone normal, No joint tenderness, no Joint swelling, Joint range of motion-normal  SKIN-no rash, no lesion    Current medications:  ALBUTerol/ipratropium for Nebulization 3 milliLiter(s) Nebulizer every 6 hours  aluminum hydroxide/magnesium hydroxide/simethicone Suspension 30 milliLiter(s) Oral every 4 hours PRN  amLODIPine   Tablet 5 milliGRAM(s) Oral at bedtime  aspirin enteric coated 325 milliGRAM(s) Oral daily  azithromycin  IVPB 500 milliGRAM(s) IV Intermittent every 24 hours  calcium carbonate 1250 mG  + Vitamin D (OsCal 500 + D) 1 Tablet(s) Oral daily  cefTRIAXone Injectable. 1000 milliGRAM(s) IV Push every 24 hours  clopidogrel Tablet 75 milliGRAM(s) Oral daily  enalapril 10 milliGRAM(s) Oral daily  famotidine    Tablet 20 milliGRAM(s) Oral two times a day  furosemide   Injectable 20 milliGRAM(s) IV Push once  guaiFENesin    Syrup 200 milliGRAM(s) Oral every 8 hours  influenza   Vaccine 0.5 milliLiter(s) IntraMuscular once  lactobacillus acidophilus 1 Tablet(s) Oral two times a day with meals  levothyroxine 25 MICROGram(s) Oral daily  methylPREDNISolone sodium succinate Injectable 40 milliGRAM(s) IV Push every 12 hours  metoprolol succinate  milliGRAM(s) Oral daily  montelukast 10 milliGRAM(s) Oral daily  ondansetron Injectable 4 milliGRAM(s) IV Push every 6 hours PRN  simvastatin 5 milliGRAM(s) Oral at bedtime              Lab Results:  CBC  CBC Full  -  ( 01 Oct 2018 05:16 )  WBC Count : 11.52 K/uL  Hemoglobin : 14.0 g/dL  Hematocrit : 43.7 %  Platelet Count - Automated : 213 K/uL  Mean Cell Volume : 95.2 fl  Mean Cell Hemoglobin : 30.5 pg  Mean Cell Hemoglobin Concentration : 32.0 gm/dL  Auto Neutrophil # : 8.62 K/uL  Auto Lymphocyte # : 1.59 K/uL  Auto Monocyte # : 1.22 K/uL  Auto Eosinophil # : 0.01 K/uL  Auto Basophil # : 0.01 K/uL  Auto Neutrophil % : 74.8 %  Auto Lymphocyte % : 13.8 %  Auto Monocyte % : 10.6 %  Auto Eosinophil % : 0.1 %  Auto Basophil % : 0.1 %    .		Differential:	[] Automated		[] Manual  Chemistry                        14.0   11.52 )-----------( 213      ( 01 Oct 2018 05:16 )             43.7     10-01    146<H>  |  110<H>  |  39<H>  ----------------------------<  124<H>  4.1   |  30  |  0.89    Ca    8.2<L>      01 Oct 2018 05:16    MICROBIOLOGY/CULTURES:  Culture Results:   No growth to date. (09-27 @ 19:29)  Culture Results:   No growth to date. (09-27 @ 19:29)  Culture Results:   No growth (09-27 @ 19:29)      RADIOLOGY & ADDITIONAL TESTS:    < from: Xray Chest 1 View- PORTABLE-Routine (09.30.18 @ 09:12) >    There is mild pulmonary vascular congestion. Heart size is moderately   enlarged. Thoracic spondylosis is noted.    Impression:    Mild pulmonary vascular congestion and cardiomegaly, grossly unchanged.      < end of copied text >  < from: Xray Chest 1 View AP/PA. (09.27.18 @ 19:10) >  The heart is enlarged. There is mild pulmonary vascular congestion.. The   osseous structures demonstrate no acute abnormality.         IMPRESSION:    Mild pulmonary vascular congestion.    < end of copied text >    < from: CT Chest No Cont (09.27.18 @ 20:25) >    IMPRESSION:      Scattered areas of groundglass opacities with peribronchial thickening   nonspecific in nature may represent small vessel small airway disease   however a infectious or inflammatory etiology cannot be excluded. No   segmental consolidations       < end of copied text >
Subjective:  Patient is a 87y old  Female who presents with a chief complaint of cough, dyspnea    HPI:      88 y/o female Jamaican speaking, try to use   # [id number], but patient do not understand the Jamaican  either , asking to wait for her daughter to speak to her. Most of the history taken from the chart review. Patient with a PMHx of CAD s/p stents x2 and Asthma presents to the  on 18 with c/o cough, not improving despite 1 week antibiotics, dyspnea on exertion, wheezing. Follow up with PMD today who advised pt to go to ED for low oxygen levels, wheezing, and coughing. Pt hypoxic with stat of 92% on room air.    Denies CP, palpitations, abdominal pain, afebrile, + productive cough.    In ED -T(F): 97.7, Max: 100 HR: 98  (69 - 98) BP: 148/99  (148/73 - 176/65) RR: 18  (17 - 19) SpO2: 98%  (93% - 98%) EKG - NSR 81, no ischemic changes , troponin 0.04,   CT chest - scattered opacities may represent small vessel disease infectious vs inflammatory (28 Sep 2018 09:50)    18 - Patient seen and examined at bedside earlier today, cough and dyspnea better, denies chest pain, afebrile, tolerates IV abx and steroids    - pt seen examined, reports productive cough , denies chest pain, dyspnea improving , O2 on ambulation low , events noted - confusion overnight    Review of system- Rest of the review of system are negative except mentioned in HPI    OBJECTIVE:   T(C): 36.4 (18 @ 16:55), Max: 36.9 (18 @ 20:22)  T(F): 97.5 (18 @ 16:55), Max: 98.4 (18 @ 20:22)  HR: 69 (18 @ 16:55) (58 - 80)  BP: 155/75 (18 @ 16:55) (140/51 - 155/75)  RR: 18 (18 @ 16:55) (18 - 18)  SpO2: 93% (18 @ 16:55) (91% - 94%)  Wt(kg): --    PHYSICAL EXAM:  GENERAL: NAD  NERVOUS SYSTEM:  Alert & Oriented X3, non- focal exam, Motor Strength 5/5 B/L upper and lower extremities; DTRs 2+ intact and symmetric  HEAD:  Atraumatic, Normocephalic  EYES: EOMI, PERRLA, conjunctiva and sclera clear  HEENT: Moist mucous membranes  NECK: Supple, No JVD  CHEST/LUNG: BS decreased at bases , +  rales, no rhonchi, occasional wheezing  HEART: Regular rate and rhythm; No murmurs, rubs, or gallops  ABDOMEN: Soft, Nontender, Nondistended; Bowel sounds present  GENITOURINARY- Voiding, no suprapubic tenderness  EXTREMITIES:  2+ Peripheral Pulses, No clubbing, cyanosis, or edema  MUSCULOSKELETAL:- No muscle tenderness, Muscle tone normal, No joint tenderness, no Joint swelling, Joint range of motion-normal  SKIN-no rash, no lesion    LABS:      141  |  107  |  34<H>  ----------------------------<  172<H>  4.3   |  27  |  0.98    Ca    8.0<L>      30 Sep 2018 07:57  Phos  3.1     -  Mg     2.4                               14.4   14.75 )-----------( 238      ( 30 Sep 2018 07:57 )             44.1   Serum Pro-Brain Natriuretic Peptide (18 @ 07:57)    Serum Pro-Brain Natriuretic Peptide: 800 pg/mL    Serum Pro-Brain Natriuretic Peptide (18 @ 07:19)    Serum Pro-Brain Natriuretic Peptide: 1245 pg/mL    Troponin I, Serum Repeat Every 8 Hours x 24 Hours (18 @ 17:16)    Troponin I, Serum: 0.052: High Sensitivity Troponin and new reference  range effective 2016 ng/mL              143  |  110<H>  |  25<H>  ----------------------------<  186<H>  4.2   |  27  |  0.84    Ca    8.4<L>      29 Sep 2018 07:19  Phos  3.1       Mg     2.4         TPro  7.3  /  Alb  3.5  /  TBili  0.2  /  DBili  x   /  AST  31  /  ALT  34  /  AlkPhos  88                              14.6   15.01 )-----------( 249      ( 29 Sep 2018 07:19 )             44.3   CARDIAC MARKERS ( 28 Sep 2018 17:16 )  0.052 ng/mL / x     / 219 U/L / x     / x      CARDIAC MARKERS ( 28 Sep 2018 09:46 )  0.053 ng/mL / x     / 230 U/L / x     / x      CARDIAC MARKERS ( 27 Sep 2018 19:29 )  0.049 ng/mL / x     / x     / x     / x      LIVER FUNCTIONS - ( 27 Sep 2018 19:29 )  Alb: 3.5 g/dL / Pro: 7.3 gm/dL / ALK PHOS: 88 U/L / ALT: 34 U/L / AST: 31 U/L / GGT: x                 Urinalysis Basic - ( 27 Sep 2018 19:29 )    Color: Yellow / Appearance: Clear / S.015 / pH: x  Gluc: x / Ketone: Negative  / Bili: Negative / Urobili: Negative mg/dL   Blood: x / Protein: 30 mg/dL / Nitrite: Negative   Leuk Esterase: Negative / RBC: 0-2 /HPF / WBC 0-2   Sq Epi: x / Non Sq Epi: Occasional / Bacteria: Occasional                            14.6   15.01 )-----------( 249      ( 29 Sep 2018 07:19 )             44.3     09-29    143  |  110<H>  |  25<H>  ----------------------------<  186<H>  4.2   |  27  |  0.84    Ca    8.4<L>      29 Sep 2018 07:19  Phos  3.1       Mg     2.4         TPro  7.3  /  Alb  3.5  /  TBili  0.2  /  DBili  x   /  AST  31  /  ALT  34  /  AlkPhos  88        CARDIAC MARKERS ( 28 Sep 2018 17:16 )  0.052 ng/mL / x     / 219 U/L / x     / x      CARDIAC MARKERS ( 28 Sep 2018 09:46 )  0.053 ng/mL / x     / 230 U/L / x     / x      CARDIAC MARKERS ( 27 Sep 2018 19:29 )  0.049 ng/mL / x     / x     / x     / x          Urinalysis Basic - ( 27 Sep 2018 19:29 )    Color: Yellow / Appearance: Clear / S.015 / pH: x  Gluc: x / Ketone: Negative  / Bili: Negative / Urobili: Negative mg/dL   Blood: x / Protein: 30 mg/dL / Nitrite: Negative   Leuk Esterase: Negative / RBC: 0-2 /HPF / WBC 0-2   Sq Epi: x / Non Sq Epi: Occasional / Bacteria: Occasional        CAPILLARY BLOOD GLUCOSE            RECENT CULTURES:  Culture - Urine (18 @ 19:29)    Specimen Source: .Urine Clean Catch (Midstream)    Culture Results:   No growth    Culture - Blood (18 @ 19:29)    Specimen Source: .Blood Blood    Culture Results:   No growth to date.      RADIOLOGY & ADDITIONAL TESTS:    < from: Xray Chest 1 View- PORTABLE-Routine (18 @ 09:12) >    There is mild pulmonary vascular congestion. Heart size is moderately   enlarged. Thoracic spondylosis is noted.    Impression:    Mild pulmonary vascular congestion and cardiomegaly, grossly unchanged.      < end of copied text >  < from: Xray Chest 1 View AP/PA. (18 @ 19:10) >  The heart is enlarged. There is mild pulmonary vascular congestion.. The   osseous structures demonstrate no acute abnormality.         IMPRESSION:    Mild pulmonary vascular congestion.    < end of copied text >    < from: CT Chest No Cont (18 @ 20:25) >    IMPRESSION:      Scattered areas of groundglass opacities with peribronchial thickening   nonspecific in nature may represent small vessel small airway disease   however a infectious or inflammatory etiology cannot be excluded. No   segmental consolidations       < end of copied text >  Current medications:  ALBUTerol/ipratropium for Nebulization 3 milliLiter(s) Nebulizer every 6 hours  aluminum hydroxide/magnesium hydroxide/simethicone Suspension 30 milliLiter(s) Oral every 4 hours PRN  amLODIPine   Tablet 5 milliGRAM(s) Oral at bedtime  aspirin enteric coated 325 milliGRAM(s) Oral daily  azithromycin  IVPB 500 milliGRAM(s) IV Intermittent every 24 hours  calcium carbonate 1250 mG  + Vitamin D (OsCal 500 + D) 1 Tablet(s) Oral daily  cefTRIAXone Injectable. 1000 milliGRAM(s) IV Push every 24 hours  clopidogrel Tablet 75 milliGRAM(s) Oral daily  enalapril 10 milliGRAM(s) Oral daily  famotidine    Tablet 20 milliGRAM(s) Oral two times a day  furosemide   Injectable 20 milliGRAM(s) IV Push once  guaiFENesin    Syrup 200 milliGRAM(s) Oral every 8 hours  influenza   Vaccine 0.5 milliLiter(s) IntraMuscular once  lactobacillus acidophilus 1 Tablet(s) Oral two times a day with meals  levothyroxine 25 MICROGram(s) Oral daily  methylPREDNISolone sodium succinate Injectable 40 milliGRAM(s) IV Push every 12 hours  metoprolol succinate  milliGRAM(s) Oral daily  montelukast 10 milliGRAM(s) Oral daily  ondansetron Injectable 4 milliGRAM(s) IV Push every 6 hours PRN  simvastatin 5 milliGRAM(s) Oral at bedtime
Subjective:  Patient is a 87y old  Female who presents with a chief complaint of cough, dyspnea    HPI:      88 y/o female O[place]i speaking, try to use   # [id number], but patient do not understand the [place]i  either , asking to wait for her daughter to speak to her. Most of the history taken from the chart review. Patient with a PMHx of CAD s/p stents x2 and Asthma presents to the  on 18 with c/o cough, not improving despite 1 week antibiotics, dyspnea on exertion, wheezing. Follow up with PMD today who advised pt to go to ED for low oxygen levels, wheezing, and coughing. Pt hypoxic with stat of 92% on room air.    Denies CP, palpitations, abdominal pain, afebrile, + productive cough.    In ED -T(F): 97.7, Max: 100 HR: 98  (69 - 98) BP: 148/99  (148/73 - 176/65) RR: 18  (17 - 19) SpO2: 98%  (93% - 98%) EKG - NSR 81, no ischemic changes , troponin 0.04,   CT chest - scattered opacities may represent small vessel disease infectious vs inflammatory (28 Sep 2018 09:50)    18 - Patient seen and examined at bedside earlier today, cough and dyspnea better, denies chest pain, afebrile, tolerates IV abx and steroids     Review of system- Rest of the review of system are negative except mentioned in HPI    OBJECTIVE:   T(C): 36.8 (18 @ 10:32), Max: 37.2 (18 @ 17:42)  HR: 50 (18 @ 10:32) (50 - 72)  BP: 137/47 (18 @ 10:32) (137/47 - 163/74)  RR: 18 (18 @ 10:32) (18 - 20)  SpO2: 96% (18 @ 10:32) (95% - 96%)  Wt(kg): --  Daily     Daily     PHYSICAL EXAM:  GENERAL: NAD  NERVOUS SYSTEM:  Alert & Oriented X3, non- focal exam, Motor Strength 5/5 B/L upper and lower extremities; DTRs 2+ intact and symmetric  HEAD:  Atraumatic, Normocephalic  EYES: EOMI, PERRLA, conjunctiva and sclera clear  HEENT: Moist mucous membranes  NECK: Supple, No JVD  CHEST/LUNG: BS decreased at bases , +  rales, no rhonchi, + wheezing  HEART: Regular rate and rhythm; No murmurs, rubs, or gallops  ABDOMEN: Soft, Nontender, Nondistended; Bowel sounds present  GENITOURINARY- Voiding, no suprapubic tenderness  EXTREMITIES:  2+ Peripheral Pulses, No clubbing, cyanosis, or edema  MUSCULOSKELETAL:- No muscle tenderness, Muscle tone normal, No joint tenderness, no Joint swelling, Joint range of motion-normal  SKIN-no rash, no lesion    LABS:      143  |  110<H>  |  25<H>  ----------------------------<  186<H>  4.2   |  27  |  0.84    Ca    8.4<L>      29 Sep 2018 07:19  Phos  3.1       Mg     2.4         TPro  7.3  /  Alb  3.5  /  TBili  0.2  /  DBili  x   /  AST  31  /  ALT  34  /  AlkPhos  88                              14.6   15.01 )-----------( 249      ( 29 Sep 2018 07:19 )             44.3   CARDIAC MARKERS ( 28 Sep 2018 17:16 )  0.052 ng/mL / x     / 219 U/L / x     / x      CARDIAC MARKERS ( 28 Sep 2018 09:46 )  0.053 ng/mL / x     / 230 U/L / x     / x      CARDIAC MARKERS ( 27 Sep 2018 19:29 )  0.049 ng/mL / x     / x     / x     / x      LIVER FUNCTIONS - ( 27 Sep 2018 19:29 )  Alb: 3.5 g/dL / Pro: 7.3 gm/dL / ALK PHOS: 88 U/L / ALT: 34 U/L / AST: 31 U/L / GGT: x                 Urinalysis Basic - ( 27 Sep 2018 19:29 )    Color: Yellow / Appearance: Clear / S.015 / pH: x  Gluc: x / Ketone: Negative  / Bili: Negative / Urobili: Negative mg/dL   Blood: x / Protein: 30 mg/dL / Nitrite: Negative   Leuk Esterase: Negative / RBC: 0-2 /HPF / WBC 0-2   Sq Epi: x / Non Sq Epi: Occasional / Bacteria: Occasional                            14.6   15.01 )-----------( 249      ( 29 Sep 2018 07:19 )             44.3         143  |  110<H>  |  25<H>  ----------------------------<  186<H>  4.2   |  27  |  0.84    Ca    8.4<L>      29 Sep 2018 07:19  Phos  3.1       Mg     2.4         TPro  7.3  /  Alb  3.5  /  TBili  0.2  /  DBili  x   /  AST  31  /  ALT  34  /  AlkPhos  88        CARDIAC MARKERS ( 28 Sep 2018 17:16 )  0.052 ng/mL / x     / 219 U/L / x     / x      CARDIAC MARKERS ( 28 Sep 2018 09:46 )  0.053 ng/mL / x     / 230 U/L / x     / x      CARDIAC MARKERS ( 27 Sep 2018 19:29 )  0.049 ng/mL / x     / x     / x     / x          Urinalysis Basic - ( 27 Sep 2018 19:29 )    Color: Yellow / Appearance: Clear / S.015 / pH: x  Gluc: x / Ketone: Negative  / Bili: Negative / Urobili: Negative mg/dL   Blood: x / Protein: 30 mg/dL / Nitrite: Negative   Leuk Esterase: Negative / RBC: 0-2 /HPF / WBC 0-2   Sq Epi: x / Non Sq Epi: Occasional / Bacteria: Occasional        CAPILLARY BLOOD GLUCOSE            RECENT CULTURES:  Culture - Urine (18 @ 19:29)    Specimen Source: .Urine Clean Catch (Midstream)    Culture Results:   No growth    Culture - Blood (18 @ 19:29)    Specimen Source: .Blood Blood    Culture Results:   No growth to date.      RADIOLOGY & ADDITIONAL TESTS:  < from: Xray Chest 1 View AP/PA. (18 @ 19:10) >  The heart is enlarged. There is mild pulmonary vascular congestion.. The   osseous structures demonstrate no acute abnormality.         IMPRESSION:    Mild pulmonary vascular congestion.    < end of copied text >    < from: CT Chest No Cont (18 @ 20:25) >    IMPRESSION:      Scattered areas of groundglass opacities with peribronchial thickening   nonspecific in nature may represent small vessel small airway disease   however a infectious or inflammatory etiology cannot be excluded. No   segmental consolidations       < end of copied text >  Current medications:  ALBUTerol/ipratropium for Nebulization 3 milliLiter(s) Nebulizer every 6 hours  aluminum hydroxide/magnesium hydroxide/simethicone Suspension 30 milliLiter(s) Oral every 4 hours PRN  amLODIPine   Tablet 5 milliGRAM(s) Oral at bedtime  aspirin enteric coated 325 milliGRAM(s) Oral daily  azithromycin  IVPB 500 milliGRAM(s) IV Intermittent every 24 hours  calcium carbonate 1250 mG  + Vitamin D (OsCal 500 + D) 1 Tablet(s) Oral daily  cefTRIAXone Injectable. 1000 milliGRAM(s) IV Push every 24 hours  clopidogrel Tablet 75 milliGRAM(s) Oral daily  enalapril 10 milliGRAM(s) Oral daily  famotidine    Tablet 20 milliGRAM(s) Oral two times a day  furosemide   Injectable 20 milliGRAM(s) IV Push once  guaiFENesin    Syrup 200 milliGRAM(s) Oral every 8 hours  influenza   Vaccine 0.5 milliLiter(s) IntraMuscular once  lactobacillus acidophilus 1 Tablet(s) Oral two times a day with meals  levothyroxine 25 MICROGram(s) Oral daily  methylPREDNISolone sodium succinate Injectable 40 milliGRAM(s) IV Push every 12 hours  metoprolol succinate  milliGRAM(s) Oral daily  montelukast 10 milliGRAM(s) Oral daily  ondansetron Injectable 4 milliGRAM(s) IV Push every 6 hours PRN  simvastatin 5 milliGRAM(s) Oral at bedtime
Subjective:  Patient is a 87y old  Female who presents with a chief complaint of cough, dyspnea    HPI:  86 y/o female Guyanese speaking, try to use   # [id number], but patient do not understand the [place]yanese  either , asking to wait for her daughter to speak to her. Most of the history taken from the chart review. Patient with a PMHx of CAD s/p stents x2 and Asthma presents to the  on 9/28/18 with c/o cough, not improving despite 1 week antibiotics, dyspnea on exertion, wheezing. Follow up with PMD today who advised pt to go to ED for low oxygen levels, wheezing, and coughing. Pt hypoxic with stat of 92% on room air.    Denies CP, palpitations, abdominal pain, afebrile, + productive cough.    In ED -T(F): 97.7, Max: 100 HR: 98  (69 - 98) BP: 148/99  (148/73 - 176/65) RR: 18  (17 - 19) SpO2: 98%  (93% - 98%) EKG - NSR 81, no ischemic changes , troponin 0.04,   CT chest - scattered opacities may represent small vessel disease infectious vs inflammatory (28 Sep 2018 09:50)    9/29/18 - Patient seen and examined at bedside earlier today, cough and dyspnea better, denies chest pain, afebrile, tolerates IV abx and steroids   9/30 - pt seen examined, reports productive cough , denies chest pain, dyspnea improving , O2 on ambulation low , events noted - confusion overnight    10/1: Above Reviewed. patient has no complaints. +cough  10/2:  Patient is hypoxic on RA on Ambulation; has no complaints    Review of system- Rest of the review of system are negative except mentioned in HPI  Vital Signs Last 24 Hrs  T(C): 36.7 (02 Oct 2018 11:26), Max: 36.7 (02 Oct 2018 11:26)  T(F): 98 (02 Oct 2018 11:26), Max: 98 (02 Oct 2018 11:26)  HR: 69 (02 Oct 2018 14:21) (47 - 69)  BP: 132/43 (02 Oct 2018 11:26) (122/52 - 148/52)  BP(mean): --  RR: 18 (02 Oct 2018 11:26) (18 - 18)  SpO2: 93% (02 Oct 2018 11:26) (93% - 95%)      PHYSICAL EXAM:  GENERAL: NAD  NERVOUS SYSTEM:  Alert & Oriented X3, non- focal exam, Motor Strength 5/5 B/L upper and lower extremities; DTRs 2+ intact and symmetric  HEAD:  Atraumatic, Normocephalic  EYES: EOMI, PERRLA, conjunctiva and sclera clear  HEENT: Moist mucous membranes  NECK: Supple, No JVD  CHEST/LUNG: BS decreased at bases , +  rales, no rhonchi, occasional wheezing  HEART: Regular rate and rhythm; No murmurs, rubs, or gallops  ABDOMEN: Soft, Nontender, Nondistended; Bowel sounds present  GENITOURINARY- Voiding, no suprapubic tenderness  EXTREMITIES:  2+ Peripheral Pulses, No clubbing, cyanosis, or edema  MUSCULOSKELETAL:- No muscle tenderness, Muscle tone normal, No joint tenderness, no Joint swelling, Joint range of motion-normal  SKIN-no rash, no lesion    Current medications:  ALBUTerol/ipratropium for Nebulization 3 milliLiter(s) Nebulizer every 6 hours  aluminum hydroxide/magnesium hydroxide/simethicone Suspension 30 milliLiter(s) Oral every 4 hours PRN  amLODIPine   Tablet 5 milliGRAM(s) Oral at bedtime  aspirin enteric coated 325 milliGRAM(s) Oral daily  azithromycin  IVPB 500 milliGRAM(s) IV Intermittent every 24 hours  calcium carbonate 1250 mG  + Vitamin D (OsCal 500 + D) 1 Tablet(s) Oral daily  cefTRIAXone Injectable. 1000 milliGRAM(s) IV Push every 24 hours  clopidogrel Tablet 75 milliGRAM(s) Oral daily  enalapril 10 milliGRAM(s) Oral daily  famotidine    Tablet 20 milliGRAM(s) Oral two times a day  furosemide   Injectable 20 milliGRAM(s) IV Push once  guaiFENesin    Syrup 200 milliGRAM(s) Oral every 8 hours  influenza   Vaccine 0.5 milliLiter(s) IntraMuscular once  lactobacillus acidophilus 1 Tablet(s) Oral two times a day with meals  levothyroxine 25 MICROGram(s) Oral daily  methylPREDNISolone sodium succinate Injectable 40 milliGRAM(s) IV Push every 12 hours  metoprolol succinate  milliGRAM(s) Oral daily  montelukast 10 milliGRAM(s) Oral daily  ondansetron Injectable 4 milliGRAM(s) IV Push every 6 hours PRN  simvastatin 5 milliGRAM(s) Oral at bedtime      Lab Results:  CBC  CBC Full  -  ( 02 Oct 2018 05:52 )  WBC Count : 9.25 K/uL  Hemoglobin : 14.1 g/dL  Hematocrit : 45.0 %  Platelet Count - Automated : 208 K/uL  Mean Cell Volume : 97.2 fl  Mean Cell Hemoglobin : 30.5 pg  Mean Cell Hemoglobin Concentration : 31.3 gm/dL  Auto Neutrophil # : x  Auto Lymphocyte # : x  Auto Monocyte # : x  Auto Eosinophil # : x  Auto Basophil # : x  Auto Neutrophil % : x  Auto Lymphocyte % : x  Auto Monocyte % : x  Auto Eosinophil % : x  Auto Basophil % : x    .		Differential:	[] Automated		[] Manual  Chemistry                        14.1   9.25  )-----------( 208      ( 02 Oct 2018 05:52 )             45.0     10-02    144  |  108  |  38<H>  ----------------------------<  103<H>  4.1   |  30  |  0.81    Ca    8.7      02 Oct 2018 05:52  Phos  3.5     10-02  Mg     2.6     10-02      MICROBIOLOGY/CULTURES:  Culture Results:   No growth to date. (09-27 @ 19:29)  Culture Results:   No growth to date. (09-27 @ 19:29)  Culture Results:   No growth (09-27 @ 19:29)    RADIOLOGY & ADDITIONAL TESTS:    < from: Xray Chest 1 View- PORTABLE-Routine (09.30.18 @ 09:12) >    There is mild pulmonary vascular congestion. Heart size is moderately   enlarged. Thoracic spondylosis is noted.    Impression:    Mild pulmonary vascular congestion and cardiomegaly, grossly unchanged.      < end of copied text >  < from: Xray Chest 1 View AP/PA. (09.27.18 @ 19:10) >  The heart is enlarged. There is mild pulmonary vascular congestion.. The   osseous structures demonstrate no acute abnormality.         IMPRESSION:    Mild pulmonary vascular congestion.    < end of copied text >    < from: CT Chest No Cont (09.27.18 @ 20:25) >    IMPRESSION:      Scattered areas of groundglass opacities with peribronchial thickening   nonspecific in nature may represent small vessel small airway disease   however a infectious or inflammatory etiology cannot be excluded. No   segmental consolidations       < end of copied text >
Subjective:  Patient is a 87y old  Female who presents with a chief complaint of cough, dyspnea    HPI:  88 y/o female [place] speaking, try to use   # [id number], but patient do not understand the [place]  either , asking to wait for her daughter to speak to her. Most of the history taken from the chart review. Patient with a PMHx of CAD s/p stents x2 and Asthma presents to the  on 9/28/18 with c/o cough, not improving despite 1 week antibiotics, dyspnea on exertion, wheezing. Follow up with PMD today who advised pt to go to ED for low oxygen levels, wheezing, and coughing. Pt hypoxic with stat of 92% on room air.    Denies CP, palpitations, abdominal pain, afebrile, + productive cough.    In ED -T(F): 97.7, Max: 100 HR: 98  (69 - 98) BP: 148/99  (148/73 - 176/65) RR: 18  (17 - 19) SpO2: 98%  (93% - 98%) EKG - NSR 81, no ischemic changes , troponin 0.04,   CT chest - scattered opacities may represent small vessel disease infectious vs inflammatory (28 Sep 2018 09:50)    9/29/18 - Patient seen and examined at bedside earlier today, cough and dyspnea better, denies chest pain, afebrile, tolerates IV abx and steroids   9/30 - pt seen examined, reports productive cough , denies chest pain, dyspnea improving , O2 on ambulation low , events noted - confusion overnight    10/1: Above Reviewed. patient has no complaints. +cough  10/2:  Patient is hypoxic on RA on Ambulation; has no complaints  10/3: No overnight events; stable on oxygen    Review of system- Rest of the review of system are negative except mentioned in HPI  Vital Signs Last 24 Hrs  T(C): 36.2 (03 Oct 2018 09:46), Max: 37.1 (02 Oct 2018 16:29)  T(F): 97.1 (03 Oct 2018 09:46), Max: 98.7 (02 Oct 2018 16:29)  HR: 66 (03 Oct 2018 09:46) (54 - 69)  BP: 108/85 (03 Oct 2018 09:46) (108/85 - 150/65)  BP(mean): --  RR: 18 (03 Oct 2018 09:46) (18 - 18)  SpO2: 96% (03 Oct 2018 09:46) (92% - 100%)      PHYSICAL EXAM:  GENERAL: NAD  NERVOUS SYSTEM:  Alert & Oriented X3, non- focal exam, Motor Strength 5/5 B/L upper and lower extremities; DTRs 2+ intact and symmetric  HEAD:  Atraumatic, Normocephalic  EYES: EOMI, PERRLA, conjunctiva and sclera clear  HEENT: Moist mucous membranes  NECK: Supple, No JVD  CHEST/LUNG: BS decreased at bases , +  rales, no rhonchi, occasional wheezing  HEART: Regular rate and rhythm; No murmurs, rubs, or gallops  ABDOMEN: Soft, Nontender, Nondistended; Bowel sounds present  GENITOURINARY- Voiding, no suprapubic tenderness  EXTREMITIES:  2+ Peripheral Pulses, No clubbing, cyanosis, or edema  MUSCULOSKELETAL:- No muscle tenderness, Muscle tone normal, No joint tenderness, no Joint swelling, Joint range of motion-normal  SKIN-no rash, no lesion    Current medications:  ALBUTerol/ipratropium for Nebulization 3 milliLiter(s) Nebulizer every 6 hours  aluminum hydroxide/magnesium hydroxide/simethicone Suspension 30 milliLiter(s) Oral every 4 hours PRN  amLODIPine   Tablet 5 milliGRAM(s) Oral at bedtime  aspirin enteric coated 325 milliGRAM(s) Oral daily  azithromycin  IVPB 500 milliGRAM(s) IV Intermittent every 24 hours  calcium carbonate 1250 mG  + Vitamin D (OsCal 500 + D) 1 Tablet(s) Oral daily  cefTRIAXone Injectable. 1000 milliGRAM(s) IV Push every 24 hours  clopidogrel Tablet 75 milliGRAM(s) Oral daily  enalapril 10 milliGRAM(s) Oral daily  famotidine    Tablet 20 milliGRAM(s) Oral two times a day  furosemide   Injectable 20 milliGRAM(s) IV Push once  guaiFENesin    Syrup 200 milliGRAM(s) Oral every 8 hours  influenza   Vaccine 0.5 milliLiter(s) IntraMuscular once  lactobacillus acidophilus 1 Tablet(s) Oral two times a day with meals  levothyroxine 25 MICROGram(s) Oral daily  methylPREDNISolone sodium succinate Injectable 40 milliGRAM(s) IV Push every 12 hours  metoprolol succinate  milliGRAM(s) Oral daily  montelukast 10 milliGRAM(s) Oral daily  ondansetron Injectable 4 milliGRAM(s) IV Push every 6 hours PRN  simvastatin 5 milliGRAM(s) Oral at bedtime    Lab Results:  CBC  CBC Full  -  ( 02 Oct 2018 05:52 )  WBC Count : 9.25 K/uL  Hemoglobin : 14.1 g/dL  Hematocrit : 45.0 %  Platelet Count - Automated : 208 K/uL  Mean Cell Volume : 97.2 fl  Mean Cell Hemoglobin : 30.5 pg  Mean Cell Hemoglobin Concentration : 31.3 gm/dL  Auto Neutrophil # : x  Auto Lymphocyte # : x  Auto Monocyte # : x  Auto Eosinophil # : x  Auto Basophil # : x  Auto Neutrophil % : x  Auto Lymphocyte % : x  Auto Monocyte % : x  Auto Eosinophil % : x  Auto Basophil % : x    .		Differential:	[] Automated		[] Manual  Chemistry                        14.1   9.25  )-----------( 208      ( 02 Oct 2018 05:52 )             45.0     10-02    144  |  108  |  38<H>  ----------------------------<  103<H>  4.1   |  30  |  0.81    Ca    8.7      02 Oct 2018 05:52  Phos  3.5     10-02  Mg     2.6     10-02      MICROBIOLOGY/CULTURES:  Culture Results:   No growth at 5 days. (09-27 @ 19:29)  Culture Results:   No growth at 5 days. (09-27 @ 19:29)  Culture Results:   No growth (09-27 @ 19:29)      RADIOLOGY & ADDITIONAL TESTS:    < from: Xray Chest 1 View- PORTABLE-Routine (09.30.18 @ 09:12) >    There is mild pulmonary vascular congestion. Heart size is moderately   enlarged. Thoracic spondylosis is noted.    Impression:    Mild pulmonary vascular congestion and cardiomegaly, grossly unchanged.      < end of copied text >  < from: Xray Chest 1 View AP/PA. (09.27.18 @ 19:10) >  The heart is enlarged. There is mild pulmonary vascular congestion.. The   osseous structures demonstrate no acute abnormality.         IMPRESSION:    Mild pulmonary vascular congestion.    < end of copied text >    < from: CT Chest No Cont (09.27.18 @ 20:25) >    IMPRESSION:      Scattered areas of groundglass opacities with peribronchial thickening   nonspecific in nature may represent small vessel small airway disease   however a infectious or inflammatory etiology cannot be excluded. No   segmental consolidations       < end of copied text >
Subjective:  This morning, Ms. Croft complains of a mild cough  Denies shortness of breath  Daughter at bedside states that she is not ambulating much    Review of Systems:  All 10 systems reviewed in detailed and found to be negative with the exception of what has already been described above    Allergies:  No Known Allergies    Meds  MEDICATIONS  (STANDING):  ALBUTerol/ipratropium for Nebulization 3 milliLiter(s) Nebulizer every 6 hours  amLODIPine   Tablet 10 milliGRAM(s) Oral at bedtime  aspirin enteric coated 325 milliGRAM(s) Oral daily  azithromycin  IVPB 500 milliGRAM(s) IV Intermittent every 24 hours  calcium carbonate 1250 mG  + Vitamin D (OsCal 500 + D) 1 Tablet(s) Oral daily  cefTRIAXone Injectable. 1000 milliGRAM(s) IV Push every 24 hours  clopidogrel Tablet 75 milliGRAM(s) Oral daily  enalapril 10 milliGRAM(s) Oral daily  famotidine    Tablet 20 milliGRAM(s) Oral two times a day  furosemide   Injectable 20 milliGRAM(s) IV Push daily  guaiFENesin    Syrup 200 milliGRAM(s) Oral every 8 hours  influenza   Vaccine 0.5 milliLiter(s) IntraMuscular once  lactobacillus acidophilus 1 Tablet(s) Oral two times a day with meals  levothyroxine 25 MICROGram(s) Oral daily  melatonin 9 milliGRAM(s) Oral at bedtime  methylPREDNISolone sodium succinate Injectable 20 milliGRAM(s) IV Push daily  metoprolol succinate  milliGRAM(s) Oral daily  montelukast 10 milliGRAM(s) Oral daily  simvastatin 5 milliGRAM(s) Oral at bedtime    MEDICATIONS  (PRN):  aluminum hydroxide/magnesium hydroxide/simethicone Suspension 30 milliLiter(s) Oral every 4 hours PRN Dyspepsia  ondansetron Injectable 4 milliGRAM(s) IV Push every 6 hours PRN Nausea  QUEtiapine 12.5 milliGRAM(s) Oral daily PRN aggitation    Physical Exam  Gen: Alert, oriented, no distress  HEENT: Anicteric sclera, moist mucous membranes, no JVD, no lymphadenopathy, good dentition  Cardio: Regular rhythm and rate, normal S1S2, no murmurs  Resp: Clear to auscultation bilaterally, no wheezing or rhonchi  GI: Nontender, nondistended, normoactive bowel sounds  Ext: No cyanosis, clubbing or edema  Neuro: Nonfocal    Labs:                        14.0   11.52 )-----------( 213      ( 01 Oct 2018 05:16 )             43.7     10-01    146<H>  |  110<H>  |  39<H>  ----------------------------<  124<H>  4.1   |  30  |  0.89    Ca    8.2<L>      01 Oct 2018 05:16    AM:  2D ECHOCARDIOGRAM AD    PROCEDURE DATE:  09/29/2018      INTERPRETATION:  Transthoracic Echocardiography Report (TTE)     Demographics     Patient name        JUDSON MCKEON      Age           87 year(s)     Med Rec #           [id number]         Gender        Female     Account #           [id number]           Date of Birth 04/12/1931     Interpreting        Konrad Rich,   Room Number   0308   Physician           MD     Referring Physician yue byrne Sonographer   Yoseph Edmonds,                                                       [facility]     Date of study       09/29/2018 08:47                       AM     Height              64.02 in          Weight        149.92 pounds    Type of Study:     TTE procedure: 2D echocardiogram AD     BP: 159/69 mmHg     Study Location: [address] [place], [place] [address]ical Quality: Technically Difficullt    Indications   1) R06.00 - Dyspnea    M-Mode Measurements (cm)     LVEDd: 4.45 cm            LVESd: 2.98 cm   IVSEd: 1.3 cm   LVPWd: 1.03 cm            AO Root Dimension: 3.2 cm                             ACS: 1.6 cm                             LA: 4.2 cm    Doppler Measurements:     AV Velocity:144 cm/s                MV Peak E-Wave: 114 cm/s   AV Peak Gradient: 8.29 mmHg         MV Peak A-Wave: 145cm/s                                       MV E/A Ratio: 0.79 %   TR Velocity:190 cm/s                MV Peak Gradient: 5.2 mmHg   TR Gradient:14.44 mmHg   Estimated RAP:5 mmHg   RVSP:19 mmHg     Findings     Mitral Valve   Fibrocalcific changes noted to the mitral valve leaflets with preserved   leaflet excursion. Mild mitral annular calcification is present.   Mild (1+) mitral regurgitation is present.     Aortic Valve   The aortic valve is not well visualized, appears minimally sclerotic.   Valve opening seems to be normal.     Tricuspid Valve   Normal appearing tricuspid valve structure and function.   Trace tricuspid valve regurgitation is present.     Pulmonic Valve   Normal appearing pulmonic valve structure and function.   Trace pulmonic valvular regurgitation is present.     Left Atrium   The left atrium is mildly dilated.     Left Ventricle   Endocardium is not always well visualized, however, overall left   ventricular systolic function appears grossly normal. Technically   Difficult Study.   Estimated left ventricular ejection fraction is 55-60 %.     Right Atrium   Normal appearing right atrium.     Right Ventricle   Normal appearing right ventricle structure and function.     Pericardial Effusion   No evidence of pericardial effusion.     Pleural Effusion   No evidence of pleural effusion.     Miscellaneous   All visualized extra cardiac structures appears to be normal.     Impression     Summary     Endocardium is not always well visualized, however, overall left   ventricular systolic function appears grossly normal. Technically   Difficult Study.   Estimated left ventricular ejection fraction is 55-60 %.   Fibrocalcific changes noted to the mitral valve leaflets with preserved   leaflet excursion. Mild mitral annular calcification is present.   Mild (1+) mitral regurgitation is present.   The aortic valve is not well visualized, appears minimally sclerotic.   Valve opening seems to be normal.     Signature     ----------------------------------------------------------------   Electronically signed by Konrad Rich MD(Interpreting   physician) on 09/29/2018 03:25 PM   ----------------------------------------------------------------    Valves     Mitral Valve     Peak E-Wave: 114 cm/s   Peak A-Wave: 145 cm/s   Peak Gradient: 5.2 mmHg                                                E/A Ratio: 0.79     Aortic Valve     Peak Velocity: 144 cm/s   Peak Gradient: 8.29 mmHg     Cusp Separation: 1.6 cm     Tricuspid Valve     TR Velocity: 190 cm/s     Estimated RAP: 5 mmHg   TR Gradient: 14.44 mmHg              Estimated RVSP: 19 mmHg     Pulmonic Valve              Estimated PASP: 19.44 mmHg    Structures     Left Atrium     LA Dimension: 4.2 cm          LA Area: 13.2 cm^2   LA/Aorta: 1.31             LA Volume/Index: 51 ml /29m^2     Left Ventricle     Diastolic Dimension: 4.45 cm          Systolic Dimension: 2.98 cm   Septum Diastolic: 1.3 cm   PW Diastolic: 1.03 cm     FS: 33.03 %     Right Atrium     RA Systolic Pressure: 5 mmHg     Right Ventricle              RV Systolic Pressure: 19.44 mmHg     Miscellaneous     Aorta     Aortic Root: 3.2 cm        KONRAD RICH   This document has been electronically signed. Sep 29 2018  3:25PM        RADIOLOGY:    EXAM:  XR CHEST PORTABLE ROUTINE 1V                            PROCEDURE DATE:  09/30/2018          INTERPRETATION:  History: Congestive heart failure.    Single view of the chest was performed.    Comparison is made to September 27, 2018.    Findings:    There is mild pulmonary vascular congestion. Heart size is moderately   enlarged. Thoracic spondylosis is noted.    Impression:    Mild pulmonary vascular congestion and cardiomegaly, grossly unchanged.
Subjective:  Waiting for O2 tank so that she can be discharged home    Review of Systems:  All 10 systems reviewed in detailed and found to be negative with the exception of what has already been described above    Allergies:  No Known Allergies    Meds  MEDICATIONS  (STANDING):  ALBUTerol/ipratropium for Nebulization 3 milliLiter(s) Nebulizer every 6 hours  amLODIPine   Tablet 10 milliGRAM(s) Oral at bedtime  aspirin enteric coated 325 milliGRAM(s) Oral daily  calcium carbonate 1250 mG  + Vitamin D (OsCal 500 + D) 1 Tablet(s) Oral daily  cefuroxime   Tablet 500 milliGRAM(s) Oral every 12 hours  clopidogrel Tablet 75 milliGRAM(s) Oral daily  enalapril 10 milliGRAM(s) Oral daily  famotidine    Tablet 20 milliGRAM(s) Oral two times a day  furosemide    Tablet 20 milliGRAM(s) Oral daily  guaiFENesin    Syrup 200 milliGRAM(s) Oral every 8 hours  influenza   Vaccine 0.5 milliLiter(s) IntraMuscular once  lactobacillus acidophilus 1 Tablet(s) Oral daily  lactobacillus acidophilus 1 Tablet(s) Oral two times a day with meals  levothyroxine 25 MICROGram(s) Oral daily  melatonin 9 milliGRAM(s) Oral at bedtime  metoprolol succinate  milliGRAM(s) Oral daily  montelukast 10 milliGRAM(s) Oral daily  predniSONE   Tablet 20 milliGRAM(s) Oral daily  simvastatin 5 milliGRAM(s) Oral at bedtime    MEDICATIONS  (PRN):  aluminum hydroxide/magnesium hydroxide/simethicone Suspension 30 milliLiter(s) Oral every 4 hours PRN Dyspepsia  ondansetron Injectable 4 milliGRAM(s) IV Push every 6 hours PRN Nausea  QUEtiapine 12.5 milliGRAM(s) Oral daily PRN aggitation    Physical Exam  Gen: Alert, oriented, no distress  HEENT: Anicteric sclera, moist mucous membranes, no JVD, no lymphadenopathy, good dentition  Cardio: Regular rhythm and rate, normal S1S2, no murmurs  Resp: Clear to auscultation bilaterally, no wheezing or rhonchi  GI: Nontender, nondistended, normoactive bowel sounds  Ext: No cyanosis, clubbing or edema  Neuro: Nonfocal    Labs:  There is mild pulmonary vascular congestion. Heart size is moderately   enlarged. Thoracic spondylosis is noted.    Impression:    Mild pulmonary vascular congestion and cardiomegaly, grossly unchanged.      Type of Study:     TTE procedure: 2D echocardiogram AD     BP: 159/69 mmHg     Study Location: NTechnical Quality: Technically Difficullt    Indications   1) R06.00 - Dyspnea    M-Mode Measurements (cm)     LVEDd: 4.45 cm            LVESd: 2.98 cm   IVSEd: 1.3 cm   LVPWd: 1.03 cm            AO Root Dimension: 3.2 cm                             ACS: 1.6 cm                             LA: 4.2 cm    Doppler Measurements:     AV Velocity:144 cm/s                MV Peak E-Wave: 114 cm/s   AV Peak Gradient: 8.29 mmHg         MV Peak A-Wave: 145cm/s                                       MV E/A Ratio: 0.79 %   TR Velocity:190 cm/s                MV Peak Gradient: 5.2 mmHg   TR Gradient:14.44 mmHg   Estimated RAP:5 mmHg   RVSP:19 mmHg     Findings     Mitral Valve   Fibrocalcific changes noted to the mitral valve leaflets with preserved   leaflet excursion. Mild mitral annular calcification is present.   Mild (1+) mitral regurgitation is present.     Aortic Valve   The aortic valve is not well visualized, appears minimally sclerotic.   Valve opening seems to be normal.     Tricuspid Valve   Normal appearing tricuspid valve structure and function.   Trace tricuspid valve regurgitation is present.     Pulmonic Valve   Normal appearing pulmonic valve structure and function.   Trace pulmonic valvular regurgitation is present.     Left Atrium   The left atrium is mildly dilated.     Left Ventricle   Endocardium is not always well visualized, however, overall left   ventricular systolic function appears grossly normal. Technically   Difficult Study.   Estimated left ventricular ejection fraction is 55-60 %.     Right Atrium   Normal appearing right atrium.     Right Ventricle   Normal appearing right ventricle structure and function.     Pericardial Effusion   No evidence of pericardial effusion.     Pleural Effusion   No evidence of pleural effusion.     Miscellaneous   All visualized extra cardiac structures appears to be normal.     Impression     Summary     Endocardium is not always well visualized, however, overall left   ventricular systolic function appears grossly normal. Technically   Difficult Study.   Estimated left ventricular ejection fraction is 55-60 %.   Fibrocalcific changes noted to the mitral valve leaflets with preserved   leaflet excursion. Mild mitral annular calcification is present.   Mild (1+) mitral regurgitation is present.   The aortic valve is not well visualized, appears minimally sclerotic.   Valve opening seems to be normal.     Signature     ----------------------------------------------------------------   Electronically signed by Donny Haines MD(Interpreting   physician) on 09/29/2018 03:25 PM   ----------------------------------------------------------------    Valves     Mitral Valve     Peak E-Wave: 114 cm/s   Peak A-Wave: 145 cm/s   Peak Gradient: 5.2 mmHg                                                E/A Ratio: 0.79     Aortic Valve     Peak Velocity: 144 cm/s   Peak Gradient: 8.29 mmHg     Cusp Separation: 1.6 cm     Tricuspid Valve     TR Velocity: 190 cm/s     Estimated RAP: 5 mmHg   TR Gradient: 14.44 mmHg              Estimated RVSP: 19 mmHg     Pulmonic Valve              Estimated PASP: 19.44 mmHg    Structures     Left Atrium     LA Dimension: 4.2 cm          LA Area: 13.2 cm^2   LA/Aorta: 1.31             LA Volume/Index: 51 ml /29m^2     Left Ventricle     Diastolic Dimension: 4.45 cm          Systolic Dimension: 2.98 cm   Septum Diastolic: 1.3 cm   PW Diastolic: 1.03 cm     FS: 33.03 %     Right Atrium     RA Systolic Pressure: 5 mmHg     Right Ventricle              RV Systolic Pressure: 19.44 mmHg     Miscellaneous     Aorta     Aortic Root: 3.2 cm

## 2018-10-04 NOTE — PROGRESS NOTE ADULT - ASSESSMENT
A/P: 88 y/o obese female Indian speaking, with a PMHx of CAD s/p PCI of RCA and Asthma presents to the HH with c/o cough.    1. Cough- PNA- cont abx as per primary team. Improving.     2. CAD s/p PCI to RCA- no active CP. Cont asa/plavix/statin. Nl LV fxn on echo.    3. +Troponins- minimally elevated in setting of PNA. No active CP. Nl LV fxn on echo.   Cont medical mgmt for now.     4. HTN- mildly elevated. Cont current meds for now. Currently on steroids which will cause a mild, reactive HTN.  BP better controlled this AM.    5. Dyslipidemia- cont statin for a goal LDL of < 70.    6. DVT proph. OOB/ambulate. Outpt f/u upon D/C.
- bilateral patchy lung infiltrates likely infectious and inflammatory in etiology .  - known patient with the CAD with the history of stent placement     PLAN     - discontinue azithromycin and change of recephin to po cefin   - discontinue iv lasix and change to po   - check the sat on room air and with the ambualtion   - out pt pft with DR metcalf once discharged.  - discharge with the albuterol and long acting bronchodilator untill seen in the office  with the symbiocort low dose
86 y/o female Japanese speaking, try to use   # [id number], but patient do not understand the Japanese  either , asking to wait for her daughter to speak to her. Most of the history taken from the chart review. Patient with a PMHx of CAD s/p stents x2 and Asthma presents to the HH with c/o cough, not improving despite 1 week antibiotics, dyspnea on exertion, wheezing. Follow up with PMD today who advised pt to go to ED for low oxygen levels, wheezing, and coughing. Pt hypoxic with stat of 92% on room air.      In ED -T(F): 97.7, Max: 100 HR: 98  (69 - 98) BP: 148/99  (148/73 - 176/65) RR: 18  (17 - 19) SpO2: 98%  (93% - 98%) EKG - NSR 81, no ischemic changes , troponin 0.04,   CT chest - scattered opacities may represent small vessel disease infectious vs inflammatory    s/p solumedrol 125 mg, s/p IV ceftriaxone and Azithromycin     * Acute hypoxemic respiratory failure due to   * Acute asthma exacerbation  * Suspected CAP  - tele   - O2 supplementation, check O2 on ambulation tomorow  - IV steroids, start taper 40 q12h  with GI protection  - IV ceftriaxone , Azithromycin   - nebs, mucolytics  - pulmonology consult - take sputum culture, mycoplasma and legionella serology  - check d-dimers - negative     * Elevated troponin suspected demand ischemia from respiratory problems r/o ACS  * Elevated BNP suspected mild acute on chronic diastolic HF   - serial troponin and EKG  - s/p IV lasix x2 days , repeat CXR in am  -LDL 66, A1C 6.1, TSH wnl  - 2 d echo - pending   - cardiology consult  - c/w DAPT, BB, statins    * HTN  - c/w home meds CCB, BB, ACEi    * HLD  - c/w nstatins    * Hypothyroidism  - c/w levothyroxine   - c/w TSH wnl    * GERD  - c/w famotidine    DVT proph - IPC
86 y/o female Vincentian speaking, try to use   # [id number], but patient do not understand the Vincentian  either , asking to wait for her daughter to speak to her. Most of the history taken from the chart review. Patient with a PMHx of CAD s/p stents x2 and Asthma presents to the HH with c/o cough, not improving despite 1 week antibiotics, dyspnea on exertion, wheezing. Follow up with PMD today who advised pt to go to ED for low oxygen levels, wheezing, and coughing. Pt hypoxic with stat of 92% on room air.      In ED -T(F): 97.7, Max: 100 HR: 98  (69 - 98) BP: 148/99  (148/73 - 176/65) RR: 18  (17 - 19) SpO2: 98%  (93% - 98%) EKG - NSR 81, no ischemic changes , troponin 0.04,   CT chest - scattered opacities may represent small vessel disease infectious vs inflammatory    s/p solumedrol 125 mg, s/p IV ceftriaxone and Azithromycin     * Acute hypoxemic respiratory failure due to   * Acute asthma exacerbation  * Suspected CAP   - tele   - RVP - Neg  - O2 supplementation, check O2 on ambulation  low , needs home O2   - Pt seen today for diagnosis of asthma exacebration , while in a chronic and stable state, my patient is still hypoxemic to 85% on ambulation on RA, with 3 L of O2 supplementation pt O2 sats wnl. Patient will require 24 hour oxygen at home  - IV solumedrol 20mg QD switch to po  taper yeny  - IV ceftriaxone , Azithromycin switch to ceftin upon D/C  - nebs, mucolytics  - pulmonology consult - take sputum culture, mycoplasma and legionella serology  - check d-dimers - negative     * Elevated troponin suspected demand ischemia from respiratory problems r/o ACS  * Elevated BNP suspected mild acute on chronic diastolic HF   - serial troponin and EKG  - c/w IV lasix Switch to PO yeny  - CXR 9/20 - still mild CHF   - LDL 66, A1C 6.1, TSH wnl  - 2 d echo - EF wnl   - cardiology consult appreciated  - c/w DAPT, BB, statins    * HTN  - c/w home meds CCB, BB, ACEi    * HLD  - c/w statins    * Hypothyroidism  - c/w levothyroxine   - c/w TSH wnl    * GERD  - c/w famotidine    * Confusion overnight suspected delirium in hospital setting, with underlying mild dementia, vs side effect of steroids  - s/p xanax 9/29  - will avoid benzo   - trial of melatonin qhs  - if agitated consider low dose of seroquel 12.5 or haldol IM 0.5 mg     DVT proph - IPC
87y old Female with PMH GERD, HLD, HTN, CAD s/p stent x 2, who presented with productive cough, wheezing and dyspnea on exertion, that persisted despite 1 week of azithromycin; she likely has asthma exacerbation, a component of volume overload, and perhaps a pneumonia.  -Continue methylprednisolone 20 mg IV and duonebs q6; can likely change to PO steroids tomorrow  -She will need outpatient PFTs however family states that she will not be able to perform this; alternatively, we can trial her on a maintenance inhaled ICS upon discharge  -Continue ceftriaxone and azithromycin  -RVP negative  -Continue to diurese  -Encourage ambulation  -Incentive spirometry
87y old Female with PMH GERD, HLD, HTN, CAD s/p stent x 2, who presented with productive cough, wheezing and dyspnea on exertion, that persisted despite 1 week of azithromycin; she likely has asthma exacerbation, a component of volume overload, and perhaps a pneumonia.  -PO prednisone  -Continue ceftin  -RVP negative  -Continue PO lasix  -Encourage ambulation  -Incentive spirometry  -She will need outpatient PFTs however family states that she will not be able to perform this; discharge on a maintenance symbicort + albuterol    She can follow up with Dr. Giordano at  [address] [place] [address]  Phone: [phone]    She will need to make a follow up appointment upon discharge.
87y old Female with PMH GERD, HLD, HTN, CAD s/p stent x 2, who presented with productive cough, wheezing and dyspnea on exertion, that persisted despite 1 week of azithromycin; she likely has asthma exacerbation, a component of volume overload, and perhaps a pneumonia.  -PO prednisone with outpatient taper  -Continue ceftin  -RVP negative  -Continue PO lasix  -Encourage ambulation  -Incentive spirometry  -She will need outpatient PFTs however family states that she will not be able to perform this; discharge on a maintenance symbicort + albuterol  -Will go home on O2, she should follow up in pulmonary clinic for repeat o2 needs assessment in 1-2 weeks    She can follow up with Dr. Giordano at  [address] [place] [address]  Phone: [phone]    She will need to make a follow up appointment upon discharge.
88 y/o female New Zealander speaking, try to use   # [id number], but patient do not understand the New Zealander  either , asking to wait for her daughter to speak to her. Most of the history taken from the chart review. Patient with a PMHx of CAD s/p stents x2 and Asthma presents to the HH with c/o cough, not improving despite 1 week antibiotics, dyspnea on exertion, wheezing. Follow up with PMD today who advised pt to go to ED for low oxygen levels, wheezing, and coughing. Pt hypoxic with stat of 92% on room air.      In ED -T(F): 97.7, Max: 100 HR: 98  (69 - 98) BP: 148/99  (148/73 - 176/65) RR: 18  (17 - 19) SpO2: 98%  (93% - 98%) EKG - NSR 81, no ischemic changes , troponin 0.04,   CT chest - scattered opacities may represent small vessel disease infectious vs inflammatory    s/p solumedrol 125 mg, s/p IV ceftriaxone and Azithromycin     * Acute hypoxemic respiratory failure due to   * Acute asthma exacerbation  * Suspected CAP   - tele   - O2 supplementation, check O2 on ambulation  low , needs home O2   - Pt seen today for diagnosis of asthma exacebration , while in a chronic and stable state, my patient is still hypoxemic to 85% on ambulation on RA, with 3 L of O2 supplementation pt O2 sats wnl. Patient will require 24 hour oxygen at home  - IV steroids, start taper 40 q12h  with GI protection--> 20 --> plan for short po taper   - IV ceftriaxone , Azithromycin   - nebs, mucolytics  - pulmonology consult - take sputum culture, mycoplasma and legionella serology  - check d-dimers - negative     * Elevated troponin suspected demand ischemia from respiratory problems r/o ACS  * Elevated BNP suspected mild acute on chronic diastolic HF   - serial troponin and EKG  - c/w IV lasix   -  CXR 9/20 - still mild CHF   -LDL 66, A1C 6.1, TSH wnl  - 2 d echo - EF wnl   - cardiology consult  - c/w DAPT, BB, statins    * HTN  - c/w home meds CCB, BB, ACEi    * HLD  - c/w statins    * Hypothyroidism  - c/w levothyroxine   - c/w TSH wnl    * GERD  - c/w famotidine    * Confusion overnight suspected delirium in hospital setting, with underlying mild dementia, vs side effect of steroids  - s/p xanax 9/29  - will avoid benzo   - trial of melatonin qhs  - if agitated consider low dose of seroquel 12.5 or haldol IM 0.5 mg     DVT proph - IPC
88 y/o female Surinamese speaking, try to use   # [id number], but patient do not understand the Surinamese  either , asking to wait for her daughter to speak to her. Most of the history taken from the chart review. Patient with a PMHx of CAD s/p stents x2 and Asthma presents to the HH with c/o cough, not improving despite 1 week antibiotics, dyspnea on exertion, wheezing. Follow up with PMD today who advised pt to go to ED for low oxygen levels, wheezing, and coughing. Pt hypoxic with stat of 92% on room air.      In ED -T(F): 97.7, Max: 100 HR: 98  (69 - 98) BP: 148/99  (148/73 - 176/65) RR: 18  (17 - 19) SpO2: 98%  (93% - 98%) EKG - NSR 81, no ischemic changes , troponin 0.04,   CT chest - scattered opacities may represent small vessel disease infectious vs inflammatory    s/p solumedrol 125 mg, s/p IV ceftriaxone and Azithromycin     * Acute hypoxemic respiratory failure due to   * Acute asthma exacerbation  * Suspected CAP   - tele   - RVP - Neg  - O2 supplementation, check O2 on ambulation  low , needs home O2   - Repeat Home o2 evaluation for diagnosis of asthma exacebration  while in a chronic and stable state, my patient is still hypoxemic to 87% on ambulation on RA, with 3 L of O2 supplementation pt O2 sats wnl. Patient will require 24 hour oxygen at home  - IV solumedrol 20mg QD switch to po yeny  - S/P ceftriaxone , Azithromycin switched to ceftin   - nebs, mucolytics  - pulmonology consult - take sputum culture, mycoplasma and legionella serology  - check d-dimers - negative     * Elevated troponin suspected demand ischemia from respiratory problems r/o ACS  * Elevated BNP suspected mild acute on chronic diastolic HF   - serial troponin and EKG  - c/w IV lasix Switch to PO eyny  - CXR 9/20 - still mild CHF   - LDL 66, A1C 6.1, TSH wnl  - 2 d echo - EF wnl   - cardiology consult appreciated  - c/w DAPT, BB, statins    * HTN  - c/w home meds CCB, BB, ACEi    * HLD  - c/w statins    * Hypothyroidism  - c/w levothyroxine   - c/w TSH wnl    * GERD  - c/w famotidine    * Confusion overnight suspected delirium in hospital setting, with underlying mild dementia, vs side effect of steroids  - s/p xanax 9/29  - will avoid benzo   - trial of melatonin qhs  - if agitated consider low dose of seroquel 12.5 or haldol IM 0.5 mg     DVT proph - IPC
88 y/o female Venezuelan speaking, try to use   # [id number], but patient do not understand the Venezuelan  either , asking to wait for her daughter to speak to her. Most of the history taken from the chart review. Patient with a PMHx of CAD s/p stents x2 and Asthma presents to the HH with c/o cough, not improving despite 1 week antibiotics, dyspnea on exertion, wheezing. Follow up with PMD today who advised pt to go to ED for low oxygen levels, wheezing, and coughing. Pt hypoxic with stat of 92% on room air.      In ED -T(F): 97.7, Max: 100 HR: 98  (69 - 98) BP: 148/99  (148/73 - 176/65) RR: 18  (17 - 19) SpO2: 98%  (93% - 98%) EKG - NSR 81, no ischemic changes , troponin 0.04,   CT chest - scattered opacities may represent small vessel disease infectious vs inflammatory    s/p solumedrol 125 mg, s/p IV ceftriaxone and Azithromycin     * Acute hypoxemic respiratory failure due to   * Acute asthma exacerbation  * Suspected CAP   - tele   - RVP - Neg  - O2 supplementation, check O2 on ambulation  low , needs home O2   - Repeat Home o2 evaluation for diagnosis of asthma exacebration  while in a chronic and stable state, my patient is still hypoxemic to 87% on ambulation on RA, with 3 L of O2 supplementation pt O2 sats wnl. Patient will require 24 hour oxygen at home  - IV solumedrol 20mg QD switch to po yeny  - S/P ceftriaxone , Azithromycin switched to ceftin   - nebs, mucolytics  - pulmonology consult - take sputum culture, mycoplasma and legionella serology  - check d-dimers - negative     * Elevated troponin suspected demand ischemia from respiratory problems r/o ACS  * Elevated BNP suspected mild acute on chronic diastolic HF   - serial troponin and EKG  - c/w IV lasix Switch to PO yeny  - CXR 9/20 - still mild CHF   - LDL 66, A1C 6.1, TSH wnl  - 2 d echo - EF wnl   - cardiology consult appreciated  - c/w DAPT, BB, statins    * HTN  - c/w home meds CCB, BB, ACEi    * HLD  - c/w statins    * Hypothyroidism  - c/w levothyroxine   - c/w TSH wnl    * GERD  - c/w famotidine    * Confusion overnight suspected delirium in hospital setting, with underlying mild dementia, vs side effect of steroids  - s/p xanax 9/29  - will avoid benzo   - trial of melatonin qhs  - if agitated consider low dose of seroquel 12.5 or haldol IM 0.5 mg     DVT proph - IPC
A/P: 86 y/o obese female Sudanese speaking, with a PMHx of CAD s/p PCI of RCA and Asthma presents to the HH with c/o cough.    1. Cough- PNA- cont abx as per primary team. Improving.     2. CAD s/p PCI to RCA- no active CP. Cont asa/plavix/statin.     3. +Troponins- minimally elevated in setting of PNA. No active CP. Nl LV fxn on echo.   Cont medical mgmt for now.     4. HTN- mildly elevated. Cont current meds for now. Currently on steroids which will cause a mild, reactive HTN.    5. Dyslipidemia- cont statin for a goal LDL of < 70.    6. DVT proph.
A/P: 88 y/o obese female Niuean speaking, with a PMHx of CAD s/p PCI of RCA and Asthma presents to the HH with c/o cough.    1. Cough- PNA- cont abx as per primary team. Improving.     2. CAD s/p PCI to RCA- no active CP. Cont asa/plavix/statin. Nl LV fxn on echo.    3. +Troponins- minimally elevated in setting of PNA. No active CP. Nl LV fxn on echo.   Cont medical mgmt for now.     4. HTN- mildly elevated. Cont current meds for now. Currently on steroids which will cause a mild, reactive HTN.  BP better controlled this AM.    5. Dyslipidemia- cont statin for a goal LDL of < 70.    6. DVT proph. OOB/ambulate. Outpt f/u upon D/C.    7. Diastolic HF- appears euvolemic. Can continue low dose PO lasix.
A/P: 86 y/o obese female Armenian speaking, with a PMHx of CAD s/p PCI of RCA and Asthma presents to the HH with c/o cough.    1. Cough- PNA- cont abx as per primary team. Improving.     2. CAD s/p PCI to RCA- no active CP. Cont asa/plavix/statin. Nl LV fxn on echo.    3. +Troponins- minimally elevated in setting of PNA. No active CP. Nl LV fxn on echo.   Cont medical mgmt for now.     4. HTN- mildly elevated. Cont current meds for now. Currently on steroids which will cause a mild, reactive HTN.  BP better controlled this AM.    5. Dyslipidemia- cont statin for a goal LDL of < 70.    6. DVT proph. OOB/ambulate. Outpt f/u upon D/C.    7. Diastolic HF- appears euvolemic. Can continue low dose PO lasix.   8. brief PSVT related to stress of the PNA? would cont BBlockers for now and ASA will see DR irchards as otpt for 1 week moniter

## 2018-10-05 LAB
M PNEUMO IGG SER IA-ACNC: 1.28 INDEX — HIGH
M PNEUMO IGG SER IA-ACNC: POSITIVE

## 2018-10-18 DIAGNOSIS — R41.82 ALTERED MENTAL STATUS, UNSPECIFIED: ICD-10-CM

## 2018-10-18 DIAGNOSIS — K21.9 GASTRO-ESOPHAGEAL REFLUX DISEASE WITHOUT ESOPHAGITIS: ICD-10-CM

## 2018-10-18 DIAGNOSIS — T38.0X5A ADVERSE EFFECT OF GLUCOCORTICOIDS AND SYNTHETIC ANALOGUES, INITIAL ENCOUNTER: ICD-10-CM

## 2018-10-18 DIAGNOSIS — I25.10 ATHEROSCLEROTIC HEART DISEASE OF NATIVE CORONARY ARTERY WITHOUT ANGINA PECTORIS: ICD-10-CM

## 2018-10-18 DIAGNOSIS — Z95.5 PRESENCE OF CORONARY ANGIOPLASTY IMPLANT AND GRAFT: ICD-10-CM

## 2018-10-18 DIAGNOSIS — E78.5 HYPERLIPIDEMIA, UNSPECIFIED: ICD-10-CM

## 2018-10-18 DIAGNOSIS — I11.0 HYPERTENSIVE HEART DISEASE WITH HEART FAILURE: ICD-10-CM

## 2018-10-18 DIAGNOSIS — J45.901 UNSPECIFIED ASTHMA WITH (ACUTE) EXACERBATION: ICD-10-CM
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
## 2018-10-18 DIAGNOSIS — I47.1 SUPRAVENTRICULAR TACHYCARDIA: ICD-10-CM

## 2018-10-18 DIAGNOSIS — I50.33 ACUTE ON CHRONIC DIASTOLIC (CONGESTIVE) HEART FAILURE: ICD-10-CM

## 2018-10-18 DIAGNOSIS — R05 COUGH: ICD-10-CM

## 2018-10-18 DIAGNOSIS — J18.9 PNEUMONIA, UNSPECIFIED ORGANISM: ICD-10-CM

## 2018-10-18 DIAGNOSIS — I24.8 OTHER FORMS OF ACUTE ISCHEMIC HEART DISEASE: ICD-10-CM

## 2020-03-01 ENCOUNTER — INPATIENT (INPATIENT)
Facility: HOSPITAL | Age: 85
LOS: 2 days | Discharge: NURSING, REHAB OR LONG-TERM CARE | DRG: 565 | End: 2020-03-04
Attending: INTERNAL MEDICINE | Admitting: HOSPITALIST
Payer: MEDICARE

## 2020-03-01 VITALS
WEIGHT: 175.05 LBS | HEIGHT: 62 IN | TEMPERATURE: 97 F | OXYGEN SATURATION: 93 % | RESPIRATION RATE: 15 BRPM | HEART RATE: 75 BPM | DIASTOLIC BLOOD PRESSURE: 56 MMHG | SYSTOLIC BLOOD PRESSURE: 157 MMHG

## 2020-03-01 DIAGNOSIS — M62.82 RHABDOMYOLYSIS: ICD-10-CM

## 2020-03-01 PROBLEM — K21.9 GASTRO-ESOPHAGEAL REFLUX DISEASE WITHOUT ESOPHAGITIS: Chronic | Status: ACTIVE | Noted: 2018-09-28

## 2020-03-01 PROBLEM — I10 ESSENTIAL (PRIMARY) HYPERTENSION: Chronic | Status: ACTIVE | Noted: 2018-09-28

## 2020-03-01 PROBLEM — J45.909 UNSPECIFIED ASTHMA, UNCOMPLICATED: Chronic | Status: ACTIVE | Noted: 2018-09-28

## 2020-03-01 PROBLEM — E78.5 HYPERLIPIDEMIA, UNSPECIFIED: Chronic | Status: ACTIVE | Noted: 2018-09-28

## 2020-03-01 PROBLEM — I25.10 ATHEROSCLEROTIC HEART DISEASE OF NATIVE CORONARY ARTERY WITHOUT ANGINA PECTORIS: Chronic | Status: ACTIVE | Noted: 2018-09-27

## 2020-03-01 LAB
ALBUMIN SERPL ELPH-MCNC: 3.7 G/DL — SIGNIFICANT CHANGE UP (ref 3.3–5)
ALP SERPL-CCNC: 66 U/L — SIGNIFICANT CHANGE UP (ref 40–120)
ALT FLD-CCNC: 58 U/L — SIGNIFICANT CHANGE UP (ref 12–78)
ANION GAP SERPL CALC-SCNC: 6 MMOL/L — SIGNIFICANT CHANGE UP (ref 5–17)
APPEARANCE UR: CLEAR — SIGNIFICANT CHANGE UP
AST SERPL-CCNC: 113 U/L — HIGH (ref 15–37)
BASOPHILS # BLD AUTO: 0 K/UL — SIGNIFICANT CHANGE UP (ref 0–0.2)
BASOPHILS NFR BLD AUTO: 0 % — SIGNIFICANT CHANGE UP (ref 0–2)
BILIRUB SERPL-MCNC: 0.7 MG/DL — SIGNIFICANT CHANGE UP (ref 0.2–1.2)
BILIRUB UR-MCNC: NEGATIVE — SIGNIFICANT CHANGE UP
BUN SERPL-MCNC: 46 MG/DL — HIGH (ref 7–23)
CALCIUM SERPL-MCNC: 9.3 MG/DL — SIGNIFICANT CHANGE UP (ref 8.5–10.1)
CHLORIDE SERPL-SCNC: 108 MMOL/L — SIGNIFICANT CHANGE UP (ref 96–108)
CK SERPL-CCNC: 2724 U/L — HIGH (ref 26–192)
CO2 SERPL-SCNC: 27 MMOL/L — SIGNIFICANT CHANGE UP (ref 22–31)
COLOR SPEC: YELLOW — SIGNIFICANT CHANGE UP
CREAT SERPL-MCNC: 1.32 MG/DL — HIGH (ref 0.5–1.3)
DIFF PNL FLD: ABNORMAL
EOSINOPHIL # BLD AUTO: 0 K/UL — SIGNIFICANT CHANGE UP (ref 0–0.5)
EOSINOPHIL NFR BLD AUTO: 0 % — SIGNIFICANT CHANGE UP (ref 0–6)
GLUCOSE SERPL-MCNC: 125 MG/DL — HIGH (ref 70–99)
GLUCOSE UR QL: NEGATIVE MG/DL — SIGNIFICANT CHANGE UP
HCT VFR BLD CALC: 48.6 % — HIGH (ref 34.5–45)
HGB BLD-MCNC: 15.7 G/DL — HIGH (ref 11.5–15.5)
KETONES UR-MCNC: NEGATIVE — SIGNIFICANT CHANGE UP
LACTATE SERPL-SCNC: 2 MMOL/L — SIGNIFICANT CHANGE UP (ref 0.7–2)
LEUKOCYTE ESTERASE UR-ACNC: NEGATIVE — SIGNIFICANT CHANGE UP
LYMPHOCYTES # BLD AUTO: 1.54 K/UL — SIGNIFICANT CHANGE UP (ref 1–3.3)
LYMPHOCYTES # BLD AUTO: 8 % — LOW (ref 13–44)
MAGNESIUM SERPL-MCNC: 2.4 MG/DL — SIGNIFICANT CHANGE UP (ref 1.6–2.6)
MCHC RBC-ENTMCNC: 30.7 PG — SIGNIFICANT CHANGE UP (ref 27–34)
MCHC RBC-ENTMCNC: 32.3 GM/DL — SIGNIFICANT CHANGE UP (ref 32–36)
MCV RBC AUTO: 95.1 FL — SIGNIFICANT CHANGE UP (ref 80–100)
MONOCYTES # BLD AUTO: 1.34 K/UL — HIGH (ref 0–0.9)
MONOCYTES NFR BLD AUTO: 7 % — SIGNIFICANT CHANGE UP (ref 2–14)
NEUTROPHILS # BLD AUTO: 16.33 K/UL — HIGH (ref 1.8–7.4)
NEUTROPHILS NFR BLD AUTO: 84 % — HIGH (ref 43–77)
NITRITE UR-MCNC: NEGATIVE — SIGNIFICANT CHANGE UP
NRBC # BLD: SIGNIFICANT CHANGE UP /100 WBCS (ref 0–0)
PH UR: 5 — SIGNIFICANT CHANGE UP (ref 5–8)
PLATELET # BLD AUTO: 264 K/UL — SIGNIFICANT CHANGE UP (ref 150–400)
POTASSIUM SERPL-MCNC: 4 MMOL/L — SIGNIFICANT CHANGE UP (ref 3.5–5.3)
POTASSIUM SERPL-SCNC: 4 MMOL/L — SIGNIFICANT CHANGE UP (ref 3.5–5.3)
PROT SERPL-MCNC: 8.2 GM/DL — SIGNIFICANT CHANGE UP (ref 6–8.3)
PROT UR-MCNC: 500 MG/DL
RBC # BLD: 5.11 M/UL — SIGNIFICANT CHANGE UP (ref 3.8–5.2)
RBC # FLD: 13.1 % — SIGNIFICANT CHANGE UP (ref 10.3–14.5)
SODIUM SERPL-SCNC: 141 MMOL/L — SIGNIFICANT CHANGE UP (ref 135–145)
SP GR SPEC: 1.02 — SIGNIFICANT CHANGE UP (ref 1.01–1.02)
TROPONIN I SERPL-MCNC: 0.19 NG/ML — HIGH (ref 0.01–0.04)
TROPONIN I SERPL-MCNC: 0.24 NG/ML — HIGH (ref 0.01–0.04)
UROBILINOGEN FLD QL: NEGATIVE MG/DL — SIGNIFICANT CHANGE UP
WBC # BLD: 19.21 K/UL — HIGH (ref 3.8–10.5)
WBC # FLD AUTO: 19.21 K/UL — HIGH (ref 3.8–10.5)

## 2020-03-01 PROCEDURE — 71045 X-RAY EXAM CHEST 1 VIEW: CPT | Mod: 26

## 2020-03-01 PROCEDURE — 84484 ASSAY OF TROPONIN QUANT: CPT

## 2020-03-01 PROCEDURE — 72170 X-RAY EXAM OF PELVIS: CPT | Mod: 26

## 2020-03-01 PROCEDURE — 85027 COMPLETE CBC AUTOMATED: CPT

## 2020-03-01 PROCEDURE — 70450 CT HEAD/BRAIN W/O DYE: CPT | Mod: 26

## 2020-03-01 PROCEDURE — 85025 COMPLETE CBC W/AUTO DIFF WBC: CPT

## 2020-03-01 PROCEDURE — 99223 1ST HOSP IP/OBS HIGH 75: CPT

## 2020-03-01 PROCEDURE — 84100 ASSAY OF PHOSPHORUS: CPT

## 2020-03-01 PROCEDURE — 84443 ASSAY THYROID STIM HORMONE: CPT

## 2020-03-01 PROCEDURE — 81001 URINALYSIS AUTO W/SCOPE: CPT

## 2020-03-01 PROCEDURE — 80061 LIPID PANEL: CPT

## 2020-03-01 PROCEDURE — 97116 GAIT TRAINING THERAPY: CPT | Mod: GP

## 2020-03-01 PROCEDURE — 87086 URINE CULTURE/COLONY COUNT: CPT

## 2020-03-01 PROCEDURE — 97162 PT EVAL MOD COMPLEX 30 MIN: CPT | Mod: GP

## 2020-03-01 PROCEDURE — 82550 ASSAY OF CK (CPK): CPT

## 2020-03-01 PROCEDURE — 93010 ELECTROCARDIOGRAM REPORT: CPT

## 2020-03-01 PROCEDURE — 36415 COLL VENOUS BLD VENIPUNCTURE: CPT

## 2020-03-01 PROCEDURE — 72125 CT NECK SPINE W/O DYE: CPT | Mod: 26

## 2020-03-01 PROCEDURE — 83735 ASSAY OF MAGNESIUM: CPT

## 2020-03-01 PROCEDURE — 80053 COMPREHEN METABOLIC PANEL: CPT

## 2020-03-01 PROCEDURE — 93306 TTE W/DOPPLER COMPLETE: CPT

## 2020-03-01 PROCEDURE — 83036 HEMOGLOBIN GLYCOSYLATED A1C: CPT

## 2020-03-01 PROCEDURE — 97530 THERAPEUTIC ACTIVITIES: CPT | Mod: GP

## 2020-03-01 PROCEDURE — 80048 BASIC METABOLIC PNL TOTAL CA: CPT

## 2020-03-01 RX ORDER — CEFTRIAXONE 500 MG/1
1000 INJECTION, POWDER, FOR SOLUTION INTRAMUSCULAR; INTRAVENOUS ONCE
Refills: 0 | Status: COMPLETED | OUTPATIENT
Start: 2020-03-01 | End: 2020-03-01

## 2020-03-01 RX ORDER — AMLODIPINE BESYLATE 2.5 MG/1
10 TABLET ORAL AT BEDTIME
Refills: 0 | Status: DISCONTINUED | OUTPATIENT
Start: 2020-03-01 | End: 2020-03-04

## 2020-03-01 RX ORDER — ONDANSETRON 8 MG/1
4 TABLET, FILM COATED ORAL EVERY 6 HOURS
Refills: 0 | Status: DISCONTINUED | OUTPATIENT
Start: 2020-03-01 | End: 2020-03-04

## 2020-03-01 RX ORDER — SODIUM CHLORIDE 9 MG/ML
1000 INJECTION INTRAMUSCULAR; INTRAVENOUS; SUBCUTANEOUS ONCE
Refills: 0 | Status: COMPLETED | OUTPATIENT
Start: 2020-03-01 | End: 2020-03-01

## 2020-03-01 RX ORDER — POTASSIUM CHLORIDE 20 MEQ
1 PACKET (EA) ORAL
Qty: 0 | Refills: 0 | DISCHARGE

## 2020-03-01 RX ORDER — SODIUM CHLORIDE 9 MG/ML
1000 INJECTION INTRAMUSCULAR; INTRAVENOUS; SUBCUTANEOUS
Refills: 0 | Status: DISCONTINUED | OUTPATIENT
Start: 2020-03-01 | End: 2020-03-02

## 2020-03-01 RX ORDER — RANITIDINE HYDROCHLORIDE 150 MG/1
1 TABLET, FILM COATED ORAL
Qty: 0 | Refills: 0 | DISCHARGE

## 2020-03-01 RX ORDER — MONTELUKAST 4 MG/1
10 TABLET, CHEWABLE ORAL DAILY
Refills: 0 | Status: DISCONTINUED | OUTPATIENT
Start: 2020-03-01 | End: 2020-03-04

## 2020-03-01 RX ORDER — ALBUTEROL 90 UG/1
1 AEROSOL, METERED ORAL EVERY 6 HOURS
Refills: 0 | Status: DISCONTINUED | OUTPATIENT
Start: 2020-03-01 | End: 2020-03-04

## 2020-03-01 RX ORDER — LEVOTHYROXINE SODIUM 125 MCG
25 TABLET ORAL DAILY
Refills: 0 | Status: DISCONTINUED | OUTPATIENT
Start: 2020-03-01 | End: 2020-03-04

## 2020-03-01 RX ORDER — ACETAMINOPHEN 500 MG
650 TABLET ORAL EVERY 6 HOURS
Refills: 0 | Status: DISCONTINUED | OUTPATIENT
Start: 2020-03-01 | End: 2020-03-04

## 2020-03-01 RX ORDER — CLOPIDOGREL BISULFATE 75 MG/1
75 TABLET, FILM COATED ORAL DAILY
Refills: 0 | Status: DISCONTINUED | OUTPATIENT
Start: 2020-03-01 | End: 2020-03-04

## 2020-03-01 RX ORDER — ASPIRIN/CALCIUM CARB/MAGNESIUM 324 MG
325 TABLET ORAL DAILY
Refills: 0 | Status: DISCONTINUED | OUTPATIENT
Start: 2020-03-01 | End: 2020-03-04

## 2020-03-01 RX ORDER — SIMVASTATIN 20 MG/1
1 TABLET, FILM COATED ORAL
Qty: 0 | Refills: 0 | DISCHARGE

## 2020-03-01 RX ORDER — METOPROLOL TARTRATE 50 MG
100 TABLET ORAL DAILY
Refills: 0 | Status: DISCONTINUED | OUTPATIENT
Start: 2020-03-01 | End: 2020-03-02

## 2020-03-01 RX ORDER — ASPIRIN/CALCIUM CARB/MAGNESIUM 324 MG
81 TABLET ORAL ONCE
Refills: 0 | Status: COMPLETED | OUTPATIENT
Start: 2020-03-01 | End: 2020-03-01

## 2020-03-01 RX ORDER — HEPARIN SODIUM 5000 [USP'U]/ML
5000 INJECTION INTRAVENOUS; SUBCUTANEOUS EVERY 12 HOURS
Refills: 0 | Status: DISCONTINUED | OUTPATIENT
Start: 2020-03-01 | End: 2020-03-04

## 2020-03-01 RX ADMIN — CEFTRIAXONE 1000 MILLIGRAM(S): 500 INJECTION, POWDER, FOR SOLUTION INTRAMUSCULAR; INTRAVENOUS at 18:45

## 2020-03-01 RX ADMIN — SODIUM CHLORIDE 2000 MILLILITER(S): 9 INJECTION INTRAMUSCULAR; INTRAVENOUS; SUBCUTANEOUS at 16:14

## 2020-03-01 RX ADMIN — Medication 81 MILLIGRAM(S): at 19:51

## 2020-03-01 NOTE — ED PROVIDER NOTE - ATTENDING CONTRIBUTION TO CARE
I, Yamilka Alfonso MD,  performed the initial face to face bedside interview with this patient regarding history of present illness, review of symptoms and relevant past medical, social and family history.  I completed an independent physical examination.  I was the initial provider who evaluated this patient. I have signed out the follow up of any pending tests (i.e. labs, radiological studies) to the ACP.  I have communicated the patient’s plan of care and disposition with the ACP.  The history, relevant review of systems, past medical and surgical history, medical decision making, and physical examination was documented by the scribe in my presence and I attest to the accuracy of the documentation.

## 2020-03-01 NOTE — ED PROVIDER NOTE - CARE PLAN
Principal Discharge DX:	Rhabdomyolysis  Secondary Diagnosis:	Elevated troponin  Secondary Diagnosis:	Acute renal failure

## 2020-03-01 NOTE — ED PROVIDER NOTE - OBJECTIVE STATEMENT
89 y/o f with PMHx of GERD, HLD, HTN, asthma, CAD presenting to the ED BIBA with family at bedside c/o abrasions to hands and feet s/p fall. Pt found on floor by family near bathroom, no one knows how long she was on the floor, last seen on Friday 2/28, last spoke to pt over the phone yesterday at 9am. Pt does not remember how she fell or if she hit her head. On Plavix daily. Lives at home alone. PCP: Dr. Margot Maki. Pt does not speak English, offered translation services but opted to have family at bedside translate for her. Pt unable to provide hx secondary to delirium.

## 2020-03-01 NOTE — H&P ADULT - NSHPLABSRESULTS_GEN_ALL_CORE
CARDIAC MARKERS ( 01 Mar 2020 18:00 )  0.191 ng/mL / x     / x     / x     / x      CARDIAC MARKERS ( 01 Mar 2020 15:36 )  0.241 ng/mL / x     / 2724 U/L / x     / x                                15.7   19.21 )-----------( 264      ( 01 Mar 2020 15:36 )             48.6     01 Mar 2020 15:36    141    |  108    |  46     ----------------------------<  125    4.0     |  27     |  1.32     Ca    9.3        01 Mar 2020 15:36  Mg     2.4       01 Mar 2020 15:36    TPro  8.2    /  Alb  3.7    /  TBili  0.7    /  DBili  x      /  AST  113    /  ALT  58     /  AlkPhos  66     01 Mar 2020 15:36      CAPILLARY BLOOD GLUCOSE        LIVER FUNCTIONS - ( 01 Mar 2020 15:36 )  Alb: 3.7 g/dL / Pro: 8.2 gm/dL / ALK PHOS: 66 U/L / ALT: 58 U/L / AST: 113 U/L / GGT: x           Urinalysis Basic - ( 01 Mar 2020 18:47 )    Color: Yellow / Appearance: Clear / S.020 / pH: x  Gluc: x / Ketone: Negative  / Bili: Negative / Urobili: Negative mg/dL   Blood: x / Protein: 500 mg/dL / Nitrite: Negative   Leuk Esterase: Negative / RBC: 3-5 /HPF / WBC Negative   Sq Epi: x / Non Sq Epi: Occasional / Bacteria: Few    EKG: NSR, Nml axis, NSST changes

## 2020-03-01 NOTE — H&P ADULT - NSHPPHYSICALEXAM_GEN_ALL_CORE
T(C): 37.6 (03-01-20 @ 18:45), Max: 37.6 (03-01-20 @ 18:45)  HR: 67 (03-01-20 @ 18:45) (67 - 75)  BP: 132/59 (03-01-20 @ 18:45) (132/59 - 157/56)  RR: 15 (03-01-20 @ 14:45) (15 - 15)  SpO2: 93% (03-01-20 @ 14:45) (93% - 93%)  Wt(kg): --    Gen: AAOx2, NAD  HEENT: NCAT, EOMI  Neck: Supple  CV: nml S1S2, RRR  Lungs: CTABL  Abd: Soft, NT, ND, BS+  Ext: No edema  Neuro: Non focal  Skin: foot abrasions  Psych: normal affect

## 2020-03-01 NOTE — ED ADULT NURSE REASSESSMENT NOTE - COMFORT CARE
Bonnie Diggs is here for GYN exam  Last Pelvic / PAP : first exam   History of abnormal pap smear:  none  Last Mammogram: none  Concerns: discuss birth control    Refills:none  Gardasil information: started     Patient would like communication of their results via:      Letter  Denies known Latex allergy or symptoms of Latex sensitivity.  Medications verified, no changes.          
straight cath for specimen, pericare provided.  Incontinence-associated dermatitis throughout tim area./repositioned
side rails up/plan of care explained/wait time explained

## 2020-03-01 NOTE — ED ADULT NURSE REASSESSMENT NOTE - NS ED NURSE REASSESS COMMENT FT1
Assumed care of patient from OCTAVIO Castro at 2000. Patient AxOx3, per family, patient Urdu speaking, declines  services at this time and wishes to use family. Patient resting comfortably in bed, pt lethargic but easily arousable, pt noted to have abrasions to bilateral hands and feet. As per family patient is incontinent of urine and stool. Patient in no acute signs of distress. All needs addressed at this time. Safety and comfort maintained. Will continue to monitor.

## 2020-03-01 NOTE — ED PROVIDER NOTE - PROGRESS NOTE DETAILS
PA note: Patient is an 89 y/o f with PMHx of GERD, HLD, HTN, asthma, CAD who presents to [facility] with family at bedside c/o abrasions to hands and feet s/p fall. Pt found on floor by family near bathroom, no one knows how long she was on the floor, last seen on Friday 2/28, last spoke to pt over the phone yesterday at 9am. Pt does not remember how she fell or if she hit her head. On Plavix daily. Lives at home alone. PCP: Dr. Margot Maki. Pt does not speak English, offered translation services but opted to have family at bedside translate for her. ~RODOLFO Hunter PA note: +Rhabdo, elevated Creatinine and troponin. Will admit patient. ~RODOLFO Hunter

## 2020-03-01 NOTE — H&P ADULT - HISTORY OF PRESENT ILLNESS
Patient is 87yo female, Swedish speaking only, with PMhx of hypothyroidism, HTN, CAD with stents, presents from home s/p fall. As per the daughter Acacia Mcclendon, who provided majority of the hx, patient was last seen on Friday. Today the family could not reach her via phone, went to her house, where she found down on the floor, unknown down time. Patient herself is very poor historian. Pt denies fever, chills, cough, CP, SOB, HA, dizziness, N/V/D. No other constitutional symptoms.

## 2020-03-01 NOTE — H&P ADULT - NSHPREVIEWOFSYSTEMS_GEN_ALL_CORE
(-)Fever, chills, cough, chest pain, sob, headache, dizziness, palpitations, abd pain, n/v/d, leg swelling  (+) foot pain

## 2020-03-01 NOTE — ED PROVIDER NOTE - SKIN, MLM
Skin normal color for race, warm, dry and intact. No evidence of rash. +ulceration to BL heels, +redness to dorsum of BL hands

## 2020-03-01 NOTE — ED PROVIDER NOTE - NEUROLOGICAL, MLM
20G AND 18G ATTEMPTED TO LEFT AC WITHOUT SUCCESS. PT TEARFUL. MOTHER STATES SHE DOESN'T WANT ANY FURTHER ATTEMPTS. PA MADE AWARE.      Dejah Sheppard RN  02/07/17 [id number]     Awake, alert, no focal deficits, no motor or sensory deficits.

## 2020-03-01 NOTE — ED ADULT NURSE NOTE - NSIMPLEMENTINTERV_GEN_ALL_ED
Implemented All Fall with Harm Risk Interventions:  [place] to call system. Call bell, personal items and telephone within reach. Instruct patient to call for assistance. Room bathroom lighting operational. Non-slip footwear when patient is off stretcher. Physically safe environment: no spills, clutter or unnecessary equipment. Stretcher in lowest position, wheels locked, appropriate side rails in place. Provide visual cue, wrist band, yellow gown, etc. Monitor gait and stability. Monitor for mental status changes and reorient to person, place, and time. Review medications for side effects contributing to fall risk. Reinforce activity limits and safety measures with patient and family. Provide visual clues: red socks.

## 2020-03-01 NOTE — ED PROVIDER NOTE - CLINICAL SUMMARY MEDICAL DECISION MAKING FREE TEXT BOX
PA note: Patient seen for fall injury, found on floor for unknown duration. +Rhabdo, elevated Creatinine and troponin. Will admit patient. ~RODOLFO Hunter

## 2020-03-01 NOTE — H&P ADULT - ASSESSMENT
87yo female with PMhx as stated a/w Rhabdo, elevated trop, Fall      FALL  Rhabdo  Elevated Troponin  ARF/Dehydration  CAD  Hypothyroidism   Otitis Media    - currently afebrile, HD stable, comfortable in NAD  - labs, imaging, chart reviewed  - CTH neg  - EKG NSR, NSST changes  - denies CP, SOB      PLAN  - supp o2 as needed, Singulair  - Augmentin PO  - IVF NS 125cc/hr  - check CPK in AM  - Cardio eval  - ECHO  - PT eval  - HOLD ACEI  - HOLD STATIN  - Synthroid  - ASA, Plavix, BB  - Norvasc  - GI ppx  - DVT ppx, HSQ  - Admit, tele

## 2020-03-01 NOTE — ED ADULT TRIAGE NOTE - CADM TRG TX PRIOR TO ARRIVAL
Colonoscopy    Surgery Date:  1/17/2019    Indication:  Colorectal cancer screening.    Procedure:  Colonoscopy with cold forcep polypectomy.    Endoscopist:  Clifford Morillo MD    Medications:  Per anesthesia.    American Society of Anesthesiologists Classification:  3 Severe systemic disease, limits activity, is not incapacitated    Estimated Blood Loss:  None.    Findings:  After informed consent was obtained, the patient was placed in left lateral decubitus position and provided MAC.  A rectal exam was performed prior to inserting the colonoscope and revealed no abnormalities.  An Olympus video endoscope was then inserted into the rectum under direct vision.  The prep was poor. Thick layer of stool coating the entire colon. Today thankfully it was lavaged able to some extent. The endoscope was advanced to the cecum and appendix without difficulty.  The cecum and appendix were visualized, photo documented and appeared normal.  A slow withdrawal was performed with detailed examination of the colonic mucosa till rectum was reached.      1 cm, sessile polyp was seen in the cecum. Cold forcep polypectomy was performed.     Multiple, large diverticuli were seen scattered on the left side of the colon. Diverticulosis was of moderate severity.     A retroflexion was performed and revealed no abnormalities.      Patient tolerated procedure and sedation well.    Complications:  None.      Impression:   1. Colon polyp ×1.  2. Poor to fair prep.  3. Left-sided diverticulosis.    Recommendations:   1. High-fiber diet.  2. Counseling on diverticulosis.  3. Repeat colonoscopy based on path results.      Thank you Dewayne Wang MD for allowing me to take care of Kevin Spears.  Please, do not hesitate to contact me with any questions.    Clifford Morillo MD      
see ambulance record

## 2020-03-01 NOTE — ED ADULT TRIAGE NOTE - CHIEF COMPLAINT QUOTE
found on floor, unknown when she fell, son states alert and oriented to baseline but poor historian. plavix use daily. traum alert initiated at 1431

## 2020-03-01 NOTE — H&P ADULT - NSICDXPASTMEDICALHX_GEN_ALL_CORE_FT
PAST MEDICAL HISTORY:  Asthma     CAD (coronary artery disease)     GERD (gastroesophageal reflux disease)     HTN (hypertension)     Hyperlipidemia

## 2020-03-01 NOTE — ED ADULT NURSE NOTE - OBJECTIVE STATEMENT
Pt BIBA from home, family reports pt last seen on Friday evening, that pt was found on floor today.  EKG done on arrival.   Cardiac and VS monitoring initiated.  IV team contacted for access.  Pt incontinent.

## 2020-03-02 LAB
ADD ON TEST-SPECIMEN IN LAB: SIGNIFICANT CHANGE UP
ADD ON TEST-SPECIMEN IN LAB: SIGNIFICANT CHANGE UP
ALBUMIN SERPL ELPH-MCNC: 2.8 G/DL — LOW (ref 3.3–5)
ALP SERPL-CCNC: 51 U/L — SIGNIFICANT CHANGE UP (ref 40–120)
ALT FLD-CCNC: 45 U/L — SIGNIFICANT CHANGE UP (ref 12–78)
ANION GAP SERPL CALC-SCNC: 6 MMOL/L — SIGNIFICANT CHANGE UP (ref 5–17)
AST SERPL-CCNC: 74 U/L — HIGH (ref 15–37)
BASOPHILS # BLD AUTO: 0.02 K/UL — SIGNIFICANT CHANGE UP (ref 0–0.2)
BASOPHILS NFR BLD AUTO: 0.1 % — SIGNIFICANT CHANGE UP (ref 0–2)
BILIRUB SERPL-MCNC: 0.6 MG/DL — SIGNIFICANT CHANGE UP (ref 0.2–1.2)
BUN SERPL-MCNC: 44 MG/DL — HIGH (ref 7–23)
CALCIUM SERPL-MCNC: 8.1 MG/DL — LOW (ref 8.5–10.1)
CHLORIDE SERPL-SCNC: 114 MMOL/L — HIGH (ref 96–108)
CHOLEST SERPL-MCNC: 105 MG/DL — SIGNIFICANT CHANGE UP (ref 10–199)
CK SERPL-CCNC: 932 U/L — HIGH (ref 26–192)
CO2 SERPL-SCNC: 24 MMOL/L — SIGNIFICANT CHANGE UP (ref 22–31)
CREAT SERPL-MCNC: 1 MG/DL — SIGNIFICANT CHANGE UP (ref 0.5–1.3)
CULTURE RESULTS: NO GROWTH — SIGNIFICANT CHANGE UP
EOSINOPHIL # BLD AUTO: 0.19 K/UL — SIGNIFICANT CHANGE UP (ref 0–0.5)
EOSINOPHIL NFR BLD AUTO: 1.4 % — SIGNIFICANT CHANGE UP (ref 0–6)
GLUCOSE SERPL-MCNC: 131 MG/DL — HIGH (ref 70–99)
HCT VFR BLD CALC: 41.3 % — SIGNIFICANT CHANGE UP (ref 34.5–45)
HDLC SERPL-MCNC: 50 MG/DL — SIGNIFICANT CHANGE UP
HGB BLD-MCNC: 13.6 G/DL — SIGNIFICANT CHANGE UP (ref 11.5–15.5)
IMM GRANULOCYTES NFR BLD AUTO: 0.3 % — SIGNIFICANT CHANGE UP (ref 0–1.5)
LIPID PNL WITH DIRECT LDL SERPL: 43 MG/DL — SIGNIFICANT CHANGE UP
LYMPHOCYTES # BLD AUTO: 0.82 K/UL — LOW (ref 1–3.3)
LYMPHOCYTES # BLD AUTO: 5.9 % — LOW (ref 13–44)
MAGNESIUM SERPL-MCNC: 2.3 MG/DL — SIGNIFICANT CHANGE UP (ref 1.6–2.6)
MCHC RBC-ENTMCNC: 31.3 PG — SIGNIFICANT CHANGE UP (ref 27–34)
MCHC RBC-ENTMCNC: 32.9 GM/DL — SIGNIFICANT CHANGE UP (ref 32–36)
MCV RBC AUTO: 95.2 FL — SIGNIFICANT CHANGE UP (ref 80–100)
MONOCYTES # BLD AUTO: 1.38 K/UL — HIGH (ref 0–0.9)
MONOCYTES NFR BLD AUTO: 9.9 % — SIGNIFICANT CHANGE UP (ref 2–14)
NEUTROPHILS # BLD AUTO: 11.56 K/UL — HIGH (ref 1.8–7.4)
NEUTROPHILS NFR BLD AUTO: 82.4 % — HIGH (ref 43–77)
PHOSPHATE SERPL-MCNC: 3.2 MG/DL — SIGNIFICANT CHANGE UP (ref 2.5–4.5)
PLATELET # BLD AUTO: 209 K/UL — SIGNIFICANT CHANGE UP (ref 150–400)
POTASSIUM SERPL-MCNC: 3.8 MMOL/L — SIGNIFICANT CHANGE UP (ref 3.5–5.3)
POTASSIUM SERPL-SCNC: 3.8 MMOL/L — SIGNIFICANT CHANGE UP (ref 3.5–5.3)
PROT SERPL-MCNC: 6.1 GM/DL — SIGNIFICANT CHANGE UP (ref 6–8.3)
RBC # BLD: 4.34 M/UL — SIGNIFICANT CHANGE UP (ref 3.8–5.2)
RBC # FLD: 13.2 % — SIGNIFICANT CHANGE UP (ref 10.3–14.5)
SODIUM SERPL-SCNC: 144 MMOL/L — SIGNIFICANT CHANGE UP (ref 135–145)
SPECIMEN SOURCE: SIGNIFICANT CHANGE UP
TOTAL CHOLESTEROL/HDL RATIO MEASUREMENT: 2.1 RATIO — LOW (ref 3.3–7.1)
TRIGL SERPL-MCNC: 60 MG/DL — SIGNIFICANT CHANGE UP (ref 10–149)
TSH SERPL-MCNC: 1.89 UU/ML — SIGNIFICANT CHANGE UP (ref 0.34–4.82)
WBC # BLD: 14.01 K/UL — HIGH (ref 3.8–10.5)
WBC # FLD AUTO: 14.01 K/UL — HIGH (ref 3.8–10.5)

## 2020-03-02 PROCEDURE — 99223 1ST HOSP IP/OBS HIGH 75: CPT

## 2020-03-02 PROCEDURE — 99233 SBSQ HOSP IP/OBS HIGH 50: CPT

## 2020-03-02 PROCEDURE — 93306 TTE W/DOPPLER COMPLETE: CPT | Mod: 26

## 2020-03-02 RX ORDER — METOPROLOL TARTRATE 50 MG
50 TABLET ORAL DAILY
Refills: 0 | Status: DISCONTINUED | OUTPATIENT
Start: 2020-03-02 | End: 2020-03-04

## 2020-03-02 RX ORDER — SIMVASTATIN 20 MG/1
5 TABLET, FILM COATED ORAL AT BEDTIME
Refills: 0 | Status: DISCONTINUED | OUTPATIENT
Start: 2020-03-02 | End: 2020-03-02

## 2020-03-02 RX ORDER — LIDOCAINE 4 G/100G
2 CREAM TOPICAL DAILY
Refills: 0 | Status: DISCONTINUED | OUTPATIENT
Start: 2020-03-02 | End: 2020-03-04

## 2020-03-02 RX ADMIN — Medication 100 MILLIGRAM(S): at 05:59

## 2020-03-02 RX ADMIN — Medication 1 TABLET(S): at 17:41

## 2020-03-02 RX ADMIN — LIDOCAINE 2 PATCH: 4 CREAM TOPICAL at 19:00

## 2020-03-02 RX ADMIN — Medication 1 TABLET(S): at 12:59

## 2020-03-02 RX ADMIN — Medication 1 TABLET(S): at 05:58

## 2020-03-02 RX ADMIN — Medication 325 MILLIGRAM(S): at 12:59

## 2020-03-02 RX ADMIN — AMLODIPINE BESYLATE 10 MILLIGRAM(S): 2.5 TABLET ORAL at 22:01

## 2020-03-02 RX ADMIN — Medication 10 MILLIGRAM(S): at 12:59

## 2020-03-02 RX ADMIN — Medication 25 MICROGRAM(S): at 05:59

## 2020-03-02 RX ADMIN — MONTELUKAST 10 MILLIGRAM(S): 4 TABLET, CHEWABLE ORAL at 12:59

## 2020-03-02 RX ADMIN — CLOPIDOGREL BISULFATE 75 MILLIGRAM(S): 75 TABLET, FILM COATED ORAL at 12:59

## 2020-03-02 RX ADMIN — Medication 650 MILLIGRAM(S): at 06:14

## 2020-03-02 RX ADMIN — HEPARIN SODIUM 5000 UNIT(S): 5000 INJECTION INTRAVENOUS; SUBCUTANEOUS at 05:58

## 2020-03-02 RX ADMIN — LIDOCAINE 2 PATCH: 4 CREAM TOPICAL at 12:59

## 2020-03-02 RX ADMIN — HEPARIN SODIUM 5000 UNIT(S): 5000 INJECTION INTRAVENOUS; SUBCUTANEOUS at 17:41

## 2020-03-02 NOTE — CONSULT NOTE ADULT - ASSESSMENT
89 yo female with PMH HTN, CAD s/p stents, hypothyroidism, presented s/p unwitnessed fall.   EP consulted for episodes of adi and 2sec pause, pt is on metoprolol 100mg daily.   will decrease bb dose by half  cont to monitor tele.   no pacing indication at this time. 87 yo female with PMH HTN, CAD s/p stents, hypothyroidism, presented s/p unwitnessed fall.   EP consulted for episodes of adi and 2sec pause, pt is on metoprolol 100mg daily.   will decrease bb dose by half  cont to monitor tele.   no pacing indication at this time.   send home with MCOT patch

## 2020-03-02 NOTE — PROGRESS NOTE ADULT - ASSESSMENT
87 y/o female p/w      1) S/p traumatic fall, unwitnessed   - Monitor on tele  - Trop mildly positive, likely demand from fall / ECG -   - UA / CT spine / CTH negative  - CXR w/ left lung opacity, no clinical indications of infection - monitor off abx at this time   - s/p IVF, CPK downtrending, hold simvastatin 5mg for now, monitor LFTs   - Check orthostatics >   - Echo pending  - Cardio f/u appreciated  - PT consult     2) Pre-renal CARMENZA    - S/p IVF - resolved  - Restart ACEi     3) Incidental CT findings  - Left mastoiditis / Otitis media  - asymptomatic at this time  - WBC improving, cont. PO Augmentin     4) CAD  - Cont. ASA / Plavix / BB    5) Hypothyroidism   - Cont. synthroid     6) HTN  - Cont. CCB     7) DVT PPX  - SQ Heparin    Dispo: Monitor on tele x24 hours. PT consult 89 y/o female p/w      1) S/p traumatic fall, unwitnessed   - Monitor on tele  - Trop mildly positive, likely demand from fall / ECG -   - UA / CT spine / CTH negative  - CXR w/ left lung opacity, no clinical indications of infection - monitor off abx at this time   - s/p IVF, CPK downtrending, hold simvastatin 5mg for now, monitor LFTs   - Check orthostatics >   - Echo pending  - Cardio f/u appreciated  - PT consult     2) Pre-renal CARMENZA    - S/p IVF - resolved  - Restart ACEi     3) Incidental CT findings  - Left mastoiditis / Otitis media  - asymptomatic at this time  - WBC improving, cont. PO Augmentin     4) CAD  - Cont. ASA / Plavix / BB    5) Hypothyroidism   - Cont. synthroid   - Check TSH    6) HTN  - Cont. CCB     7) DVT PPX  - SQ Heparin    Dispo: Monitor on tele x24 hours. PT consult 87 y/o female p/w      1) S/p traumatic fall, unwitnessed   - Monitor on tele, monitor revealing Wenckebach - EP Cx pending     - Trop mildly positive, likely demand from fall / ECG -   - UA / CT spine / CTH negative  - CXR w/ left lung opacity, no clinical indications of infection - monitor off abx at this time   - s/p IVF, CPK downtrending, hold simvastatin 5mg for now, monitor LFTs   - Check orthostatics >   - Echo pending  - Cardio f/u appreciated  - PT consult     2) Pre-renal CARMENZA    - S/p IVF - resolved  - Restart ACEi     3) Incidental CT findings  - Left mastoiditis / Otitis media  - asymptomatic at this time  - WBC improving, cont. PO Augmentin     4) CAD  - Cont. ASA / Plavix / BB    5) Hypothyroidism   - Cont. synthroid   - Check TSH    6) HTN  - Cont. CCB     7) DVT PPX  - SQ Heparin    Dispo: Monitor on tele x24 hours. PT consult >    Full Code 87 y/o female p/w      1) S/p traumatic fall, unwitnessed   - Monitor on tele, monitor revealing Wenckebach - EP Cx pending     - Trop mildly positive, likely demand from fall / ECG -   - UA / CT spine / CTH negative  - CXR w/ left lung opacity, no clinical indications of infection - monitor off abx at this time   - s/p IVF, CPK downtrending - DC IVFS   hold simvastatin 5mg for now, monitor LFTs - resume statin on discharge   - Check orthostatics >   - Echo pending  - Cardio f/u appreciated   - PT consult   Pt seen by EP  for episodes of adi and 2sec pause, pt is on metoprolol 100mg daily.   EP recs decrease bb dose by half  cont to monitor tele.  send home with MCOT patch    2) Pre-renal CARMENZA    - S/p IVF - resolved  - Restart ACEi     3) Incidental CT findings  - Left mastoiditis / Otitis media  - asymptomatic at this time  - WBC improving, cont. PO Augmentin     4) CAD  - Cont. ASA / Plavix / BB    5) Hypothyroidism   - Cont. synthroid   - Check TSH    6) HTN  - Cont. CCB     7) DVT PPX  - SQ Heparin    Dispo: Monitor on tele x24 hours. PT consult >    Full Code    Pt seen with NP Marisol Kent, POC and dc plan discussed with NP and team at IDR[facility]. Reviewed and agree with above DC Note by her, with my changes included.   spoke to son at bedside - son and daughter make decisions together with patient - want rehab at this time

## 2020-03-02 NOTE — CONSULT NOTE ADULT - SUBJECTIVE AND OBJECTIVE BOX
Patient is a 88y old  Female who presents with a chief complaint of Fall, Rhabdo, elevated troponin.       HPI:  Patient is 89yo female, Syriac speaking only, with PMhx of hypothyroidism, HTN, CAD with stents, presents from home s/p fall. As per the daughter Acacia Mcclendon, at presentation who provided majority of the hx, patient was last seen on Friday. On day of admission the family could not reach her via phone, went to her house, where she found down on the floor, unknown down time. Patient herself is very poor historian. Pt denies fever, chills, cough, CP, SOB, HA, dizziness, N/V/D. No other constitutional symptoms.    This am her son at bedside and he states that this is not the first time she fell and she will need help from now on.            PAST MEDICAL & SURGICAL HISTORY:  GERD (gastroesophageal reflux disease)  Hyperlipidemia  HTN (hypertension)  Asthma  CAD (coronary artery disease)  No significant past surgical history      MEDICATIONS  (STANDING):  amLODIPine   Tablet 10 milliGRAM(s) Oral at bedtime  amoxicillin  875 milliGRAM(s)/clavulanate 1 Tablet(s) Oral two times a day  aspirin enteric coated 325 milliGRAM(s) Oral daily  calcium carbonate 1250 mG  + Vitamin D (OsCal 500 + D) 1 Tablet(s) Oral daily  clopidogrel Tablet 75 milliGRAM(s) Oral daily  heparin  Injectable 5000 Unit(s) SubCutaneous every 12 hours  levothyroxine 25 MICROGram(s) Oral daily  metoprolol succinate  milliGRAM(s) Oral daily  montelukast 10 milliGRAM(s) Oral daily  multivitamin 1 Tablet(s) Oral daily  sodium chloride 0.9%. 1000 milliLiter(s) (125 mL/Hr) IV Continuous <Continuous>    MEDICATIONS  (PRN):  acetaminophen   Tablet .. 650 milliGRAM(s) Oral every 6 hours PRN Temp greater or equal to 38C (100.4F), Mild Pain (1 - 3)  ALBUTerol    90 MICROgram(s) HFA Inhaler 1 Puff(s) Inhalation every 6 hours PRN Shortness of Breath and/or Wheezing  ondansetron Injectable 4 milliGRAM(s) IV Push every 6 hours PRN Nausea      FAMILY HISTORY:  Family history of heart attack      SOCIAL HISTORY:    REVIEW OF SYSTEMS:  CONSTITUTIONAL:    No fatigue, malaise, lethargy.  No fever or chills.  RESPIRATORY:  No cough.  No wheeze.  No hemoptysis.  No shortness of breath.  CARDIOVASCULAR:  No chest pains.  No palpitations. No shortness of breath, No orthopnea or PND.  GASTROINTESTINAL:  No abdominal pain.  No nausea or vomiting.    GENITOURINARY:    No hematuria.    MUSCULOSKELETAL:  No musculoskeletal pain.  No joint swelling.  No arthritis.  NEUROLOGICAL:  No tingling or numbness or weakness.  PSYCHIATRIC:  No confusion        Vital Signs Last 24 Hrs  T(C): 36.3 (02 Mar 2020 05:11), Max: 37.6 (01 Mar 2020 18:45)  T(F): 97.3 (02 Mar 2020 05:11), Max: 99.6 (01 Mar 2020 18:45)  HR: 95 (02 Mar 2020 05:11) (67 - 95)  BP: 153/65 (02 Mar 2020 05:11) (128/64 - 157/56)  BP(mean): --  RR: 18 (02 Mar 2020 05:11) (15 - 18)  SpO2: 96% (02 Mar 2020 05:11) (93% - 96%)    PHYSICAL EXAM-    Constitutional:     Head: Head is normocephalic and atraumatic.      Neck: No jugular venous distention. No audible carotid bruits. There are strong carotid pulses bilaterally. No JVD.     Cardiovascular: Regular rate and rhythm without S3, S4. No murmurs or rubs are appreciated.      Respiratory: Breathsounds are normal. No rales. No wheezing.    Abdomen: Soft, nontender, nondistended with positive bowel sounds.      Extremity: No tenderness. No  pitting edema     Neurologic: The patient is alert and oriented.      Skin: No rash, no obvious lesions noted.      Psychiatric: The patient appears to be emotionally stable.      INTERPRETATION OF TELEMETRY:    ECG: Sinus rythm , LBBB    I&O's Detail      LABS:                        13.6   14.01 )-----------( 209      ( 02 Mar 2020 06:47 )             41.3     03-02    144  |  114<H>  |  44<H>  ----------------------------<  131<H>  3.8   |  24  |  1.00    Ca    8.1<L>      02 Mar 2020 06:47  Phos  3.2     03-02  Mg     2.3     03-02    TPro  6.1  /  Alb  2.8<L>  /  TBili  0.6  /  DBili  x   /  AST  74<H>  /  ALT  45  /  AlkPhos  51  03-02    CARDIAC MARKERS ( 02 Mar 2020 06:47 )  x     / x     / 932 U/L / x     / x      CARDIAC MARKERS ( 01 Mar 2020 18:00 )  0.191 ng/mL / x     / x     / x     / x      CARDIAC MARKERS ( 01 Mar 2020 15:36 )  0.241 ng/mL / x     / 2724 U/L / x     / x            Urinalysis Basic - ( 01 Mar 2020 18:47 )    Color: Yellow / Appearance: Clear / S.020 / pH: x  Gluc: x / Ketone: Negative  / Bili: Negative / Urobili: Negative mg/dL   Blood: x / Protein: 500 mg/dL / Nitrite: Negative   Leuk Esterase: Negative / RBC: 3-5 /HPF / WBC Negative   Sq Epi: x / Non Sq Epi: Occasional / Bacteria: Few      I&O's Summary    BNP  RADIOLOGY & ADDITIONAL STUDIES: Patient is a 88y old  Female who presents with a chief complaint of Fall, Rhabdo, elevated troponin.       HPI:  Patient is 89yo female, Frisian speaking only, with PMhx of hypothyroidism, HTN, CAD with stents, presents from home s/p fall. As per the daughter Acacia Mcclendon, at presentation who provided majority of the hx, patient was last seen on Friday. On day of admission the family could not reach her via phone, went to her house, where she found down on the floor, unknown down time. Patient herself is very poor historian. Pt denies fever, chills, cough, CP, SOB, HA, dizziness, N/V/D. No other constitutional symptoms.    This am her son at bedside and he states that this is not the first time she fell and she will need help from now on.  Pt denies any CP or SOB.             PAST MEDICAL & SURGICAL HISTORY:  GERD (gastroesophageal reflux disease)  Hyperlipidemia  HTN (hypertension)  Asthma  CAD (coronary artery disease)  No significant past surgical history      MEDICATIONS  (STANDING):  amLODIPine   Tablet 10 milliGRAM(s) Oral at bedtime  amoxicillin  875 milliGRAM(s)/clavulanate 1 Tablet(s) Oral two times a day  aspirin enteric coated 325 milliGRAM(s) Oral daily  calcium carbonate 1250 mG  + Vitamin D (OsCal 500 + D) 1 Tablet(s) Oral daily  clopidogrel Tablet 75 milliGRAM(s) Oral daily  heparin  Injectable 5000 Unit(s) SubCutaneous every 12 hours  levothyroxine 25 MICROGram(s) Oral daily  metoprolol succinate  milliGRAM(s) Oral daily  montelukast 10 milliGRAM(s) Oral daily  multivitamin 1 Tablet(s) Oral daily  sodium chloride 0.9%. 1000 milliLiter(s) (125 mL/Hr) IV Continuous <Continuous>    MEDICATIONS  (PRN):  acetaminophen   Tablet .. 650 milliGRAM(s) Oral every 6 hours PRN Temp greater or equal to 38C (100.4F), Mild Pain (1 - 3)  ALBUTerol    90 MICROgram(s) HFA Inhaler 1 Puff(s) Inhalation every 6 hours PRN Shortness of Breath and/or Wheezing  ondansetron Injectable 4 milliGRAM(s) IV Push every 6 hours PRN Nausea      FAMILY HISTORY:  Family history of heart attack      SOCIAL HISTORY:    REVIEW OF SYSTEMS:  CONSTITUTIONAL:    No fatigue, malaise, lethargy.  No fever or chills.  RESPIRATORY:  No cough.  No wheeze.  No hemoptysis.  No shortness of breath.  CARDIOVASCULAR:  No chest pains.  No palpitations. No shortness of breath, No orthopnea or PND.  GASTROINTESTINAL:  No abdominal pain.  No nausea or vomiting.    GENITOURINARY:    No hematuria.    MUSCULOSKELETAL:  c/o musculoskeletal pain.  No joint swelling.  No arthritis.  NEUROLOGICAL:  No tingling or numbness or weakness.  PSYCHIATRIC:  No confusion        Vital Signs Last 24 Hrs  T(C): 36.3 (02 Mar 2020 05:11), Max: 37.6 (01 Mar 2020 18:45)  T(F): 97.3 (02 Mar 2020 05:11), Max: 99.6 (01 Mar 2020 18:45)  HR: 95 (02 Mar 2020 05:11) (67 - 95)  BP: 153/65 (02 Mar 2020 05:11) (128/64 - 157/56)  BP(mean): --  RR: 18 (02 Mar 2020 05:11) (15 - 18)  SpO2: 96% (02 Mar 2020 05:11) (93% - 96%)    PHYSICAL EXAM-    Constitutional:     Head: Head is normocephalic and atraumatic.      Neck: No jugular venous distention. No audible carotid bruits. There are strong carotid pulses bilaterally. No JVD.     Cardiovascular: Regular rate and rhythm without S3, S4. No murmurs or rubs are appreciated.      Respiratory: Breathsounds are normal. No rales. No wheezing.    Abdomen: Soft, nontender, nondistended with positive bowel sounds.      Extremity: No tenderness. No  pitting edema     Neurologic: The patient is alert and oriented.      Skin: No rash, no obvious lesions noted.      Psychiatric: The patient appears to be emotionally stable.      INTERPRETATION OF TELEMETRY: SR     ECG: Sinus rythm , LBBB    I&O's Detail      LABS:                        13.6   14.01 )-----------( 209      ( 02 Mar 2020 06:47 )             41.3     03-02    144  |  114<H>  |  44<H>  ----------------------------<  131<H>  3.8   |  24  |  1.00    Ca    8.1<L>      02 Mar 2020 06:47  Phos  3.2     03-02  Mg     2.3     03-02    TPro  6.1  /  Alb  2.8<L>  /  TBili  0.6  /  DBili  x   /  AST  74<H>  /  ALT  45  /  AlkPhos  51  03-02    CARDIAC MARKERS ( 02 Mar 2020 06:47 )  x     / x     / 932 U/L / x     / x      CARDIAC MARKERS ( 01 Mar 2020 18:00 )  0.191 ng/mL / x     / x     / x     / x      CARDIAC MARKERS ( 01 Mar 2020 15:36 )  0.241 ng/mL / x     / 2724 U/L / x     / x            Urinalysis Basic - ( 01 Mar 2020 18:47 )    Color: Yellow / Appearance: Clear / S.020 / pH: x  Gluc: x / Ketone: Negative  / Bili: Negative / Urobili: Negative mg/dL   Blood: x / Protein: 500 mg/dL / Nitrite: Negative   Leuk Esterase: Negative / RBC: 3-5 /HPF / WBC Negative   Sq Epi: x / Non Sq Epi: Occasional / Bacteria: Few      I&O's Summary    BNP  RADIOLOGY & ADDITIONAL STUDIES:  < from: Transthoracic Echocardiogram (18 @ 09:08) >     Endocardium is not always well visualized, however, overall left   ventricular systolic function appears grossly normal. Technically   Difficult Study.   Estimated left ventricular ejection fraction is 55-60 %.   Fibrocalcific changes noted to the mitral valve leaflets with preserved   leaflet excursion. Mild mitral annular calcification is present.   Mild (1+) mitral regurgitation is present.   The aortic valve is not well visualized, appears minimally sclerotic.   Valve opening seems to be normal.     Signature     ----------------------------------------------------------------   Electronically signed by Donny Haines MD(Interpreting   physician) on 2018 03:25 PM   ----------------------------------------------------------------    < end of copied text >

## 2020-03-02 NOTE — CONSULT NOTE ADULT - SUBJECTIVE AND OBJECTIVE BOX
HPI:  Patient is 89 yo female, Persian speaking only, with PMhx of hypothyroidism, HTN, CAD with stents, presents from home s/p fall. As per the daughter who provided majority of the hx, patient was last seen on Friday. Today the family could not reach her via phone, went to her house, where she found down on the floor, unknown down time. Patient herself is very poor historian. Pt denies fever, chills, cough, CP, SOB, HA, dizziness, N/V/D. No other constitutional symptoms. (01 Mar 2020 20:05)  She denies any chest pain, sob dizziness or palpitations.       PAST MEDICAL & SURGICAL HISTORY:  GERD (gastroesophageal reflux disease)  Hyperlipidemia  HTN (hypertension)  Asthma  CAD (coronary artery disease)  No significant past surgical history      MEDICATIONS  (STANDING):  amLODIPine   Tablet 10 milliGRAM(s) Oral at bedtime  amoxicillin  875 milliGRAM(s)/clavulanate 1 Tablet(s) Oral two times a day  aspirin enteric coated 325 milliGRAM(s) Oral daily  calcium carbonate 1250 mG  + Vitamin D (OsCal 500 + D) 1 Tablet(s) Oral daily  clopidogrel Tablet 75 milliGRAM(s) Oral daily  enalapril 10 milliGRAM(s) Oral daily  heparin  Injectable 5000 Unit(s) SubCutaneous every 12 hours  levothyroxine 25 MICROGram(s) Oral daily  lidocaine   Patch 2 Patch Transdermal daily  metoprolol succinate  milliGRAM(s) Oral daily  montelukast 10 milliGRAM(s) Oral daily  multivitamin 1 Tablet(s) Oral daily    MEDICATIONS  (PRN):  acetaminophen   Tablet .. 650 milliGRAM(s) Oral every 6 hours PRN Temp greater or equal to 38C (100.4F), Mild Pain (1 - 3)  ALBUTerol    90 MICROgram(s) HFA Inhaler 1 Puff(s) Inhalation every 6 hours PRN Shortness of Breath and/or Wheezing  ondansetron Injectable 4 milliGRAM(s) IV Push every 6 hours PRN Nausea      Allergies    No Known Allergies      SOCIAL HISTORY: denies smoking , drinking or illicit drug use.      FAMILY HISTORY:  Family history of heart attack      Vital Signs Last 24 Hrs  T(C): 36.6 (02 Mar 2020 10:55), Max: 37.6 (01 Mar 2020 18:45)  T(F): 97.9 (02 Mar 2020 10:55), Max: 99.6 (01 Mar 2020 18:45)  HR: 96 (02 Mar 2020 10:55) (67 - 96)  BP: 109/54 (02 Mar 2020 10:55) (109/54 - 153/65)  BP(mean): --  RR: 18 (02 Mar 2020 10:55) (16 - 18)  SpO2: 96% (02 Mar 2020 10:55) (94% - 96%)      CONSTITUTIONAL: No fever, weight loss, chills, shakes, or fatigue  EYES: No eye pain, visual disturbances, or discharge  ENMT:  No difficulty hearing, tinnitus, vertigo; No sinus or throat pain  NECK: No pain or stiffness  RESPIRATORY: No cough, wheezing, hemoptysis, or shortness of breath  CARDIOVASCULAR: No chest pain, dyspnea, palpitations, dizziness, syncope  GASTROINTESTINAL: No abdominal  pain, nausea, vomiting, diarrhea, constipation, melena or bright red blood.  GENITOURINARY: No dysuria, nocturia, hematuria, or urinary incontinence  NEUROLOGICAL: No headaches, memory loss, slurred speech, limb weakness, loss of strength, numbness, or tremors  SKIN: No itching, burning, rashes, or lesions   ENDOCRINE: No heat or cold intolerance, or hair loss  MUSCULOSKELETAL: No joint pain or swelling, muscle, back, or extremity pain  PSYCHIATRIC: No depression, anxiety, or difficulty sleeping  HEME/LYMPH: No easy bruising or bleeding gums      PHYSICAL EXAMINATION:    Constitutional: NAD, awake and alert, well-developed  HEENT: PERR, EOMI,  No oral cyananosis.  Neck:  supple,  No JVD  Respiratory: Breath sounds are clear bilaterally, No wheezing, rales or rhonchi  Cardiovascular: S1 and S2, regular rate and rhythm, no Murmurs, gallops or rubs  Gastrointestinal: Bowel Sounds present, soft, nontender.   Extremities: No peripheral edema. No clubbing or cyanosis.  Vascular: 2+ peripheral pulses  Neurological: alert and orient to place and person,  no focal deficits  Musculoskeletal: no calf tenderness.  Skin: No rashes.      LABS:                        13.6   14.01 )-----------( 209      ( 02 Mar 2020 06:47 )             41.3   03-02    144  |  114<H>  |  44<H>  ----------------------------<  131<H>  3.8   |  24  |  1.00    Ca    8.1<L>      02 Mar 2020 06:47  Phos  3.2     03-02  Mg     2.3     03-02    TPro  6.1  /  Alb  2.8<L>  /  TBili  0.6  /  DBili  x   /  AST  74<H>  /  ALT  45  /  AlkPhos  51  03-02  LIVER FUNCTIONS - ( 02 Mar 2020 06:47 )  Alb: 2.8 g/dL / Pro: 6.1 gm/dL / ALK PHOS: 51 U/L / ALT: 45 U/L / AST: 74 U/L / GGT: x           CARDIAC MARKERS ( 02 Mar 2020 06:47 )  x     / x     / 932 U/L / x     / x      CARDIAC MARKERS ( 01 Mar 2020 18:00 )  0.191 ng/mL / x     / x     / x     / x      CARDIAC MARKERS ( 01 Mar 2020 15:36 )  0.241 ng/mL / x     / 2724 U/L / x     / x          Urinalysis Basic - ( 01 Mar 2020 18:47 )    Color: Yellow / Appearance: Clear / S.020 / pH: x  Gluc: x / Ketone: Negative  / Bili: Negative / Urobili: Negative mg/dL   Blood: x / Protein: 500 mg/dL / Nitrite: Negative   Leuk Esterase: Negative / RBC: 3-5 /HPF / WBC Negative   Sq Epi: x / Non Sq Epi: Occasional / Bacteria: Few        EKG: 3/1/2020 sinus rhythm 66bpm, Cameron 126ms, QRS 140ms, LBBB    TELEMETRY:  sinus rhytm 50-60bpm, pauses 2.2 sec with junctional escape beat        CARDIAC TESTS:   Impression     Summary     Endocardium is not always well visualized, however, overall left   ventricular systolic function appears grossly normal. Technically   Difficult Study.   Estimated left ventricular ejection fraction is 55-60 %.   Fibrocalcific changes noted to the mitral valve leaflets with preserved   leaflet excursion. Mild mitral annular calcification is present.   Mild (1+) mitral regurgitation is present.   The aortic valve is not well visualized, appears minimally sclerotic.   Valve opening seems to be normal.     Signature     ----------------------------------------------------------------   Electronically signed by Donny Haines MD([facility]   physician) on 2018 03:25 PM   ----------------------------------------------------------------

## 2020-03-02 NOTE — CONSULT NOTE ADULT - ASSESSMENT
Fall- unwitnessed.  per son this has been happening.  No arrythmias on tele.  Check orthostatic BP and if normal P Tevaluation recommended.    Elevated cardiac enzymes -CAD- she had PCI of RCA and LCX in past.  Continue home meds for now.  Cardiac enzymes are mildly elevated and almost in the same range.  This could likely be demand from ongoing clinical noncardiac issues.  Close monitoring recommended for now and pt to f/u with cardiologist after discharge from the hospital as outpt.     Rhabdomyolysis- CPK trending down.  Now getting NSS 125cc/hre.  Recommend decreasing IVF  D/W Dr Russo.    Other medical issues- Management per primary team.   Thank you for allowing me to participate in the care of this patient. Please feel free to contact me with any questions.

## 2020-03-02 NOTE — PROGRESS NOTE ADULT - SUBJECTIVE AND OBJECTIVE BOX
CHIEF COMPLAINT: Unwitnessed Fall / Rhabdomyolysis / Elevated Troponin      HPI: 87 y/o female, [place] speaking only, with PMhx of hypothyroidism, HTN, CAD with stents, presents from home s/p unwitnessed fall. As per the daughter Acacia Mcclendon, who provided majority of the hx, patient was last seen on Friday. The family could not reach her via phone, went to her house, where she found down on the floor, unknown down time. Patient herself is very poor historian. Pt denies fever, chills, cough, CP, SOB, HA, dizziness, N/V/D. No other constitutional symptoms on admission    In ED found to have, +otitis/mastoiditis, CK 2724, UA normal    SUBJECTIVE:   3/2 - son at bedside translating. Patient denies cp or palp, weakness, HA, dizziness    ROS: + B/L knee pain; All other 10-point review of systems is negative unless indicated above    Vital Signs Last 24 Hrs  T(C): 36.3 (02 Mar 2020 05:11), Max: 37.6 (01 Mar 2020 18:45)  T(F): 97.3 (02 Mar 2020 05:11), Max: 99.6 (01 Mar 2020 18:45)  HR: 95 (02 Mar 2020 05:11) (67 - 95)  BP: 153/65 (02 Mar 2020 05:11) (128/64 - 157/56)  BP(mean): --  RR: 18 (02 Mar 2020 05:11) (15 - 18)  SpO2: 96% (02 Mar 2020 05:11) (93% - 96%)      PHYSICAL EXAM:    Constitutional: Awake and alert, well-developed - son bedside interpreting   HEENT: EOMI, Normal Hearing, MMM  Neck: Soft and supple, No LAD, No JVD  Respiratory: Breath sounds are clear bilaterally, No wheezing, rales or rhonchi  Cardiovascular: S1 and S2, regular rate and rhythm, no Murmurs, gallops or rubs  Gastrointestinal: Bowel Sounds present, soft, nontender, nondistended, no guarding, no rebound  Extremities: No peripheral edema  Vascular: 2+ peripheral pulses  Neurological: A/O x 3, no focal deficits - son bedside interpreting   Musculoskeletal: 5/5 strength b/l upper and lower extremities  Skin: B/L heel blisters         MEDICATIONS  (STANDING):  amLODIPine   Tablet 10 milliGRAM(s) Oral at bedtime  amoxicillin  875 milliGRAM(s)/clavulanate 1 Tablet(s) Oral two times a day  aspirin enteric coated 325 milliGRAM(s) Oral daily  calcium carbonate 1250 mG  + Vitamin D (OsCal 500 + D) 1 Tablet(s) Oral daily  clopidogrel Tablet 75 milliGRAM(s) Oral daily  heparin  Injectable 5000 Unit(s) SubCutaneous every 12 hours  levothyroxine 25 MICROGram(s) Oral daily  lidocaine   Patch 2 Patch Transdermal daily  metoprolol succinate  milliGRAM(s) Oral daily  montelukast 10 milliGRAM(s) Oral daily  multivitamin 1 Tablet(s) Oral daily    MEDICATIONS  (PRN):  acetaminophen   Tablet .. 650 milliGRAM(s) Oral every 6 hours PRN Temp greater or equal to 38C (100.4F), Mild Pain (1 - 3)  ALBUTerol    90 MICROgram(s) HFA Inhaler 1 Puff(s) Inhalation every 6 hours PRN Shortness of Breath and/or Wheezing  ondansetron Injectable 4 milliGRAM(s) IV Push every 6 hours PRN Nausea        LABS: All Labs Reviewed:                        13.6   14.01 )-----------( 209      ( 02 Mar 2020 06:47 )             41.3     03-02    144  |  114<H>  |  44<H>  ----------------------------<  131<H>  3.8   |  24  |  1.00    Ca    8.1<L>      02 Mar 2020 06:47  Phos  3.2     03-02  Mg     2.3     03-02    TPro  6.1  /  Alb  2.8<L>  /  TBili  0.6  /  DBili  x   /  AST  74<H>  /  ALT  45  /  AlkPhos  51  03-02      CARDIAC MARKERS ( 02 Mar 2020 06:47 )  x     / x     / 932 U/L / x     / x      CARDIAC MARKERS ( 01 Mar 2020 18:00 )  0.191 ng/mL / x     / x     / x     / x      CARDIAC MARKERS ( 01 Mar 2020 15:36 )  0.241 ng/mL / x     / 2724 U/L / x     / x          RADIOLOGY/EKG:  3/2 - NSR - HR 70-80s     < from: CT Cervical Spine No Cont (03.01.20 @ 16:44) >    IMPRESSION:     No CT evidence of acute traumatic injury to the cervical spine.    MRI may be obtained, if further information regarding ligamentous injury, hematoma or spinal cord pathology is required.     < end of copied text >    < from: CT Head No Cont (03.01.20 @ 16:44) >  IMPRESSION:  LEFT mastoiditis and otitis media.  No acute intracranial findings.  Moderate atrophy and chronic white matter microvascular ischemic changes as described.   If acute stroke is of clinical concern, MRI with diffusion-weighted images would be helpful for further characterization.    < end of copied text > CHIEF COMPLAINT: Unwitnessed Fall / Rhabdomyolysis / Elevated Troponin      HPI: 89 y/o female, Wol[place] speaking only, with PMhx of hypothyroidism, HTN, CAD with stents, presents from home s/p unwitnessed fall. As per the daughter Acacia Mcclendon, who provided majority of the hx, patient was last seen on Friday. The family could not reach her via phone, went to her house, where she found down on the floor, unknown down time. Patient herself is very poor historian. Pt denies fever, chills, cough, CP, SOB, HA, dizziness, N/V/D. No other constitutional symptoms on admission    In ED found to have, +otitis/mastoiditis, CK 2724, UA normal    SUBJECTIVE:   3/2 - son at bedside translating. patient no cooperative/understanding with  phone. Patient denies cp or palp, weakness, HA, dizziness    ROS: + B/L knee pain; All other 10-point review of systems is negative unless indicated above    Vital Signs Last 24 Hrs  T(C): 36.3 (02 Mar 2020 05:11), Max: 37.6 (01 Mar 2020 18:45)  T(F): 97.3 (02 Mar 2020 05:11), Max: 99.6 (01 Mar 2020 18:45)  HR: 95 (02 Mar 2020 05:11) (67 - 95)  BP: 153/65 (02 Mar 2020 05:11) (128/64 - 157/56)  BP(mean): --  RR: 18 (02 Mar 2020 05:11) (15 - 18)  SpO2: 96% (02 Mar 2020 05:11) (93% - 96%)      PHYSICAL EXAM:    Constitutional: Awake and alert, well-developed - son bedside interpreting   HEENT: EOMI, Normal Hearing, MMM  Neck: Soft and supple, No LAD, No JVD  Respiratory: Breath sounds are clear bilaterally, No wheezing, rales or rhonchi  Cardiovascular: S1 and S2, regular rate and rhythm, no Murmurs, gallops or rubs  Gastrointestinal: Bowel Sounds present, soft, nontender, nondistended, no guarding, no rebound  Extremities: No peripheral edema  Vascular: 2+ peripheral pulses  Neurological: A/O x 3, no focal deficits - son bedside interpreting   Musculoskeletal: 5/5 strength b/l upper and lower extremities  Skin: B/L heel blisters         MEDICATIONS  (STANDING):  amLODIPine   Tablet 10 milliGRAM(s) Oral at bedtime  amoxicillin  875 milliGRAM(s)/clavulanate 1 Tablet(s) Oral two times a day  aspirin enteric coated 325 milliGRAM(s) Oral daily  calcium carbonate 1250 mG  + Vitamin D (OsCal 500 + D) 1 Tablet(s) Oral daily  clopidogrel Tablet 75 milliGRAM(s) Oral daily  heparin  Injectable 5000 Unit(s) SubCutaneous every 12 hours  levothyroxine 25 MICROGram(s) Oral daily  lidocaine   Patch 2 Patch Transdermal daily  metoprolol succinate  milliGRAM(s) Oral daily  montelukast 10 milliGRAM(s) Oral daily  multivitamin 1 Tablet(s) Oral daily    MEDICATIONS  (PRN):  acetaminophen   Tablet .. 650 milliGRAM(s) Oral every 6 hours PRN Temp greater or equal to 38C (100.4F), Mild Pain (1 - 3)  ALBUTerol    90 MICROgram(s) HFA Inhaler 1 Puff(s) Inhalation every 6 hours PRN Shortness of Breath and/or Wheezing  ondansetron Injectable 4 milliGRAM(s) IV Push every 6 hours PRN Nausea        LABS: All Labs Reviewed:                        13.6   14.01 )-----------( 209      ( 02 Mar 2020 06:47 )             41.3     03-02    144  |  114<H>  |  44<H>  ----------------------------<  131<H>  3.8   |  24  |  1.00    Ca    8.1<L>      02 Mar 2020 06:47  Phos  3.2     03-02  Mg     2.3     03-02    TPro  6.1  /  Alb  2.8<L>  /  TBili  0.6  /  DBili  x   /  AST  74<H>  /  ALT  45  /  AlkPhos  51  03-02      CARDIAC MARKERS ( 02 Mar 2020 06:47 )  x     / x     / 932 U/L / x     / x      CARDIAC MARKERS ( 01 Mar 2020 18:00 )  0.191 ng/mL / x     / x     / x     / x      CARDIAC MARKERS ( 01 Mar 2020 15:36 )  0.241 ng/mL / x     / 2724 U/L / x     / x          RADIOLOGY/EKG:  3/2 - NSR - HR 70-80s     < from: CT Cervical Spine No Cont (03.01.20 @ 16:44) >    IMPRESSION:     No CT evidence of acute traumatic injury to the cervical spine.    MRI may be obtained, if further information regarding ligamentous injury, hematoma or spinal cord pathology is required.     < end of copied text >    < from: CT Head No Cont (03.01.20 @ 16:44) >  IMPRESSION:  LEFT mastoiditis and otitis media.  No acute intracranial findings.  Moderate atrophy and chronic white matter microvascular ischemic changes as described.   If acute stroke is of clinical concern, MRI with diffusion-weighted images would be helpful for further characterization.    < end of copied text > HPI: 87 y/o female, [place] speaking only, with PMhx of hypothyroidism, HTN, CAD with stents, presents from home s/p unwitnessed fall. As per the daughter Acacia Mcclendon, who provided majority of the hx, patient was last seen on Friday. The family could not reach her via phone, went to her house, where she found down on the floor, unknown down time. Patient herself is very poor historian. Pt denies fever, chills, cough, CP, SOB, HA, dizziness, N/V/D. No other constitutional symptoms on admission  In ED found to have, +otitis/mastoiditis, CK 2724, UA normal    SUBJECTIVE:   3/2 - son at bedside translating. patient not cooperative/understanding with  phone. Patient denies cp or palp, weakness, HA, dizziness; no ear pain  ROS: + B/L knee pain - per son unwitnessed fall; she was down on the floor - then eventually dragged herself across the room to answer the door     PHYSICAL EXAM:  Constitutional: Awake and alert, well-developed - son bedside interpreting   HEENT: EOMI, Normal Hearing, MMM  Neck: Soft and supple, No LAD, No JVD  Respiratory: Breath sounds are clear bilaterally, No wheezing, rales or rhonchi  Cardiovascular: S1 and S2, regular rate and rhythm, no Murmurs, gallops or rubs  Gastrointestinal: Bowel Sounds present, soft, nontender, nondistended, no guarding, no rebound  Extremities: No peripheral edema  Vascular: 2+ peripheral pulses  Neurological: A/O x 3, no focal deficits - son bedside interpreting   Musculoskeletal: 5/5 strength b/l upper and lower extremities  Skin: B/L heel blisters       LABS: All Labs Reviewed:                        13.6   14.01 )-----------( 209      ( 02 Mar 2020 06:47 )             41.3  CARDIAC MARKERS ( 02 Mar 2020 06:47 )  x     / x     / 932 U/L / x     / x      CARDIAC MARKERS ( 01 Mar 2020 18:00 )  0.191 ng/mL / x     / x     / x     / x      CARDIAC MARKERS ( 01 Mar 2020 15:36 )  0.241 ng/mL / x     / 2724 U/L / x     / x          RADIOLOGY/EKG:  3/2 - NSR - HR 70-80s   < from: CT Cervical Spine No Cont (03.01.20 @ 16:44) >  IMPRESSION:   No CT evidence of acute traumatic injury to the cervical spine.  MRI may be obtained, if further information regarding ligamentous injury, hematoma or spinal cord pathology is required.   < from: CT Head No Cont (03.01.20 @ 16:44) >  IMPRESSION:  LEFT mastoiditis and otitis media.  No acute intracranial findings.  Moderate atrophy and chronic white matter microvascular ischemic changes as described.   If acute stroke is of clinical concern, MRI with diffusion-weighted images would be helpful for further characterization.

## 2020-03-02 NOTE — PHYSICAL THERAPY INITIAL EVALUATION ADULT - ADDITIONAL COMMENTS
Pt is a household amb, refuses to use RW, refuses help with ADLs, Pt's family endorses pt is very sedentary.

## 2020-03-02 NOTE — PHYSICAL THERAPY INITIAL EVALUATION ADULT - PHYSICAL ASSIST/NONPHYSICAL ASSIST: STAND/SIT, REHAB EVAL
Pt c/o upper back and left sided flank pain x2 months and now worse. Pt saw PCP and is scheduled for a gall bladder test. Pt with nausea. Pt took Excedrin for pain today. Back pain is worse when taking a deep breath. 1 person assist/verbal cues

## 2020-03-02 NOTE — PHYSICAL THERAPY INITIAL EVALUATION ADULT - GENERAL OBSERVATIONS, REHAB EVAL
Pt rec'd sitting up in bedside chair, no complaints, cooperative with PT. Pt is Cook Islander speaking only, unable to use  phone due to dialect, dtr at bedside able to translate.

## 2020-03-02 NOTE — PHYSICAL THERAPY INITIAL EVALUATION ADULT - PERTINENT HX OF CURRENT PROBLEM, REHAB EVAL
89yo female, Sudanese speaking only, with PMhx of hypothyroidism, HTN, CAD with stents, presents from home s/p fall. As per the daughter Acacia Mcclendon, who provided majority of the hx, patient was last seen on Friday. Today the family could not reach her via phone, went to her house, where she found down on the floor, unknown down time. Patient herself is very poor historian.

## 2020-03-03 PROCEDURE — 99232 SBSQ HOSP IP/OBS MODERATE 35: CPT

## 2020-03-03 RX ADMIN — Medication 10 MILLIGRAM(S): at 05:30

## 2020-03-03 RX ADMIN — LIDOCAINE 2 PATCH: 4 CREAM TOPICAL at 19:00

## 2020-03-03 RX ADMIN — LIDOCAINE 2 PATCH: 4 CREAM TOPICAL at 01:00

## 2020-03-03 RX ADMIN — AMLODIPINE BESYLATE 10 MILLIGRAM(S): 2.5 TABLET ORAL at 23:02

## 2020-03-03 RX ADMIN — HEPARIN SODIUM 5000 UNIT(S): 5000 INJECTION INTRAVENOUS; SUBCUTANEOUS at 05:29

## 2020-03-03 RX ADMIN — Medication 50 MILLIGRAM(S): at 05:29

## 2020-03-03 RX ADMIN — Medication 1 TABLET(S): at 17:14

## 2020-03-03 RX ADMIN — LIDOCAINE 2 PATCH: 4 CREAM TOPICAL at 11:08

## 2020-03-03 RX ADMIN — Medication 1 TABLET(S): at 06:01

## 2020-03-03 RX ADMIN — HEPARIN SODIUM 5000 UNIT(S): 5000 INJECTION INTRAVENOUS; SUBCUTANEOUS at 17:14

## 2020-03-03 RX ADMIN — Medication 25 MICROGRAM(S): at 05:29

## 2020-03-03 RX ADMIN — CLOPIDOGREL BISULFATE 75 MILLIGRAM(S): 75 TABLET, FILM COATED ORAL at 11:08

## 2020-03-03 RX ADMIN — Medication 1 TABLET(S): at 11:08

## 2020-03-03 RX ADMIN — MONTELUKAST 10 MILLIGRAM(S): 4 TABLET, CHEWABLE ORAL at 11:08

## 2020-03-03 RX ADMIN — Medication 325 MILLIGRAM(S): at 11:08

## 2020-03-03 NOTE — PROGRESS NOTE ADULT - ASSESSMENT
Fall- unwitnessed.  per son this has been happening.  No arrythmias on tele.  No orthostasis , PT evaluation.     Elevated cardiac enzymes -CAD- she had PCI of RCA and LCX in past.  Continue home meds for now.  Cardiac enzymes are mildly elevated and almost in the same range.  This could likely be demand from ongoing clinical noncardiac issues.  Close monitoring recommended for now and pt to f/u with cardiologist after discharge from the hospital as outpt.     Rhabdomyolysis- resolved, likely from fall     Other medical issues- Management per primary team.   Thank you for allowing me to participate in the care of this patient. Please feel free to contact me with any questions.

## 2020-03-03 NOTE — PROGRESS NOTE ADULT - SUBJECTIVE AND OBJECTIVE BOX
HPI: 87 y/o female, Belizean speaking only, with PMhx of hypothyroidism, HTN, CAD with stents, presents from home s/p unwitnessed fall. As per the daughter Acacia Mcclendon, who provided majority of the hx, patient was last seen on Friday. The family could not reach her via phone, went to her house, where she found down on the floor, unknown down time. Patient herself is very poor historian. Pt denies fever, chills, cough, CP, SOB, HA, dizziness, N/V/D. No other constitutional symptoms on admission  In ED found to have, +otitis/mastoiditis, CK 2724, UA normal    SUBJECTIVE:   3/2 - son at bedside translating. patient not cooperative/understanding with  phone. Patient denies cp or palp, weakness, HA, dizziness; no ear pain  ROS: + B/L knee pain - per son unwitnessed fall; she was down on the floor - then eventually dragged herself across the room to answer the door   3/3 - daughter Radha at bedside; pt reports pain is better; no cp palps sob or lightheadedness    PHYSICAL EXAM:  Constitutional: Awake and alert, well-developed - son bedside interpreting   HEENT: EOMI, Normal Hearing, MMM  Neck: Soft and supple, No LAD, No JVD  Respiratory: Breath sounds are clear bilaterally, No wheezing, rales or rhonchi  Cardiovascular: S1 and S2, regular rate and rhythm, no Murmurs, gallops or rubs  Gastrointestinal: Bowel Sounds present, soft, nontender, nondistended, no guarding, no rebound  Extremities: No peripheral edema  Vascular: 2+ peripheral pulses  Neurological: A/O x 3, no focal deficits - son bedside interpreting   Musculoskeletal: 5/5 strength b/l upper and lower extremities  Skin: B/L heel blisters     RADIOLOGY/EKG:  3/2 - NSR - HR 70-80s, pauses  3/3 - NSR no further pauses/mobitz etc   < from: CT Cervical Spine No Cont (03.01.20 @ 16:44) >  IMPRESSION:   No CT evidence of acute traumatic injury to the cervical spine.  MRI may be obtained, if further information regarding ligamentous injury, hematoma or spinal cord pathology is required.   < from: CT Head No Cont (03.01.20 @ 16:44) >  IMPRESSION:  LEFT mastoiditis and otitis media.  No acute intracranial findings.  Moderate atrophy and chronic white matter microvascular ischemic changes as described.   If acute stroke is of clinical concern, MRI with diffusion-weighted images would be helpful for further characterization.    LABS: All Labs Reviewed:                        13.6   14.01 )-----------( 209      ( 02 Mar 2020 06:47 )             41.3         acetaminophen   Tablet .. 650 milliGRAM(s) Oral every 6 hours PRN  ALBUTerol    90 MICROgram(s) HFA Inhaler 1 Puff(s) Inhalation every 6 hours PRN  amLODIPine   Tablet 10 milliGRAM(s) Oral at bedtime  amoxicillin  875 milliGRAM(s)/clavulanate 1 Tablet(s) Oral two times a day  aspirin enteric coated 325 milliGRAM(s) Oral daily  calcium carbonate 1250 mG  + Vitamin D (OsCal 500 + D) 1 Tablet(s) Oral daily  clopidogrel Tablet 75 milliGRAM(s) Oral daily  enalapril 10 milliGRAM(s) Oral daily  heparin  Injectable 5000 Unit(s) SubCutaneous every 12 hours  levothyroxine 25 MICROGram(s) Oral daily  lidocaine   Patch 2 Patch Transdermal daily  metoprolol succinate ER 50 milliGRAM(s) Oral daily  montelukast 10 milliGRAM(s) Oral daily  multivitamin 1 Tablet(s) Oral daily  ondansetron Injectable 4 milliGRAM(s) IV Push every 6 hours PRN

## 2020-03-03 NOTE — PROGRESS NOTE ADULT - SUBJECTIVE AND OBJECTIVE BOX
Patient is a 88y old  Female who presents with a chief complaint of Fall, Rhabdo, elevated troponin.       HPI:  Patient is 87yo female, Azeri speaking only, with PMhx of hypothyroidism, HTN, CAD with stents, presents from home s/p fall. As per the daughter Acacia Mcclendon, at presentation who provided majority of the hx, patient was last seen on Friday. On day of admission the family could not reach her via phone, went to her house, where she found down on the floor, unknown down time. Patient herself is very poor historian. Pt denies fever, chills, cough, CP, SOB, HA, dizziness, N/V/D. No other constitutional symptoms.  3/2-   This am her son at bedside and he states that this is not the first time she fell and she will need help from now on.  Pt denies any CP or SOB.     3/3-Pt seen and examined by me this am .Pt denies any CP or SOB.         PAST MEDICAL & SURGICAL HISTORY:  GERD (gastroesophageal reflux disease)  Hyperlipidemia  HTN (hypertension)  Asthma  CAD (coronary artery disease)  No significant past surgical history      MEDICATIONS  (STANDING):  amLODIPine   Tablet 10 milliGRAM(s) Oral at bedtime  amoxicillin  875 milliGRAM(s)/clavulanate 1 Tablet(s) Oral two times a day  aspirin enteric coated 325 milliGRAM(s) Oral daily  calcium carbonate 1250 mG  + Vitamin D (OsCal 500 + D) 1 Tablet(s) Oral daily  clopidogrel Tablet 75 milliGRAM(s) Oral daily  heparin  Injectable 5000 Unit(s) SubCutaneous every 12 hours  levothyroxine 25 MICROGram(s) Oral daily  metoprolol succinate  milliGRAM(s) Oral daily  montelukast 10 milliGRAM(s) Oral daily  multivitamin 1 Tablet(s) Oral daily  sodium chloride 0.9%. 1000 milliLiter(s) (125 mL/Hr) IV Continuous <Continuous>    MEDICATIONS  (PRN):  acetaminophen   Tablet .. 650 milliGRAM(s) Oral every 6 hours PRN Temp greater or equal to 38C (100.4F), Mild Pain (1 - 3)  ALBUTerol    90 MICROgram(s) HFA Inhaler 1 Puff(s) Inhalation every 6 hours PRN Shortness of Breath and/or Wheezing  ondansetron Injectable 4 milliGRAM(s) IV Push every 6 hours PRN Nausea      FAMILY HISTORY:  Family history of heart attack      SOCIAL HISTORY:    REVIEW OF SYSTEMS:  CONSTITUTIONAL:    No fatigue, malaise, lethargy.  No fever or chills.  RESPIRATORY:  No cough.  No wheeze.  No hemoptysis.  No shortness of breath.  CARDIOVASCULAR:  No chest pains.  No palpitations. No shortness of breath, No orthopnea or PND.  GASTROINTESTINAL:  No abdominal pain.  No nausea or vomiting.    GENITOURINARY:    No hematuria.    MUSCULOSKELETAL:  c/o musculoskeletal pain.  No joint swelling.  No arthritis.  NEUROLOGICAL:  No tingling or numbness or weakness.  PSYCHIATRIC:  No confusion      ICU Vital Signs Last 24 Hrs  T(C): 37.3 (03 Mar 2020 05:37), Max: 37.3 (03 Mar 2020 05:37)  T(F): 99.2 (03 Mar 2020 05:37), Max: 99.2 (03 Mar 2020 05:37)  HR: 69 (03 Mar 2020 05:37) (61 - 96)  BP: 126/53 (03 Mar 2020 05:37) (109/54 - 126/53)  BP(mean): --  ABP: --  ABP(mean): --  RR: 18 (03 Mar 2020 05:37) (18 - 18)  SpO2: 96% (03 Mar 2020 05:37) (96% - 96%)      PHYSICAL EXAM-    Constitutional:     Head: Head is normocephalic and atraumatic.      Neck: No jugular venous distention. No audible carotid bruits. There are strong carotid pulses bilaterally. No JVD.     Cardiovascular: Regular rate and rhythm without S3, S4. No murmurs or rubs are appreciated.      Respiratory: Breath sounds are normal. No rales. No wheezing.    Abdomen: Soft, nontender, nondistended with positive bowel sounds.      Extremity: No tenderness. No  pitting edema     Neurologic: The patient is alert and oriented.      Skin: No rash, no obvious lesions noted.      Psychiatric: The patient appears to be emotionally stable.      INTERPRETATION OF TELEMETRY: SR     ECG: Sinus rythm , LBBB    I&O's Detail      LABS:                                   13.6   14.01 )-----------( 209      ( 02 Mar 2020 06:47 )             41.3     03-02    144  |  114<H>  |  44<H>  ----------------------------<  131<H>  3.8   |  24  |  1.00    Ca    8.1<L>      02 Mar 2020 06:47  Phos  3.2     03-02  Mg     2.3     03-02    TPro  6.1  /  Alb  2.8<L>  /  TBili  0.6  /  DBili  x   /  AST  74<H>  /  ALT  45  /  AlkPhos  51  03-02    CARDIAC MARKERS ( 02 Mar 2020 06:47 )  x     / x     / 932 U/L / x     / x      CARDIAC MARKERS ( 01 Mar 2020 18:00 )  0.191 ng/mL / x     / x     / x     / x      CARDIAC MARKERS ( 01 Mar 2020 15:36 )  0.241 ng/mL / x     / 2724 U/L / x     / x          LIVER FUNCTIONS - ( 02 Mar 2020 06:47 )  Alb: 2.8 g/dL / Pro: 6.1 gm/dL / ALK PHOS: 51 U/L / ALT: 45 U/L / AST: 74 U/L / GGT: x             03-02 Chol 105 LDL 43 HDL 50 Trig 60          Urinalysis Basic - ( 01 Mar 2020 18:47 )    Color: Yellow / Appearance: Clear / S.020 / pH: x  Gluc: x / Ketone: Negative  / Bili: Negative / Urobili: Negative mg/dL   Blood: x / Protein: 500 mg/dL / Nitrite: Negative   Leuk Esterase: Negative / RBC: 3-5 /HPF / WBC Negative   Sq Epi: x / Non Sq Epi: Occasional / Bacteria: Few      I&O's Summary    BNP  RADIOLOGY & ADDITIONAL STUDIES:  < from: Transthoracic Echocardiogram (18 @ 09:08) >     Endocardium is not always well visualized, however, overall left   ventricular systolic function appears grossly normal. Technically   Difficult Study.   Estimated left ventricular ejection fraction is 55-60 %.   Fibrocalcific changes noted to the mitral valve leaflets with preserved   leaflet excursion. Mild mitral annular calcification is present.   Mild (1+) mitral regurgitation is present.   The aortic valve is not well visualized, appears minimally sclerotic.   Valve opening seems to be normal.     Signature     ----------------------------------------------------------------   Electronically signed by Donny Haines MD([facility]   physician) on 2018 03:25 PM   ----------------------------------------------------------------    < end of copied text >

## 2020-03-03 NOTE — PROGRESS NOTE ADULT - SUBJECTIVE AND OBJECTIVE BOX
HPI:  Patient is 89 yo female, [place]or[place] speaking only, with PMhx of hypothyroidism, HTN, CAD with stents, presents from home s/p fall. As per the daughter who provided majority of the hx, patient was last seen on Friday. Today the family could not reach her via phone, went to her house, where she found down on the floor, unknown down time. Patient herself is very poor historian. Pt denies fever, chills, cough, CP, SOB, HA, dizziness, N/V/D. No other constitutional symptoms. (01 Mar 2020 20:05)      Subjective: no c/o chest pain, sob dizziness or palpitations.     TELE: sinus rhythm in 60s, no pauses, no AVB, LBBB    MEDICATIONS  (STANDING):  amLODIPine   Tablet 10 milliGRAM(s) Oral at bedtime  amoxicillin  875 milliGRAM(s)/clavulanate 1 Tablet(s) Oral two times a day  aspirin enteric coated 325 milliGRAM(s) Oral daily  calcium carbonate 1250 mG  + Vitamin D (OsCal 500 + D) 1 Tablet(s) Oral daily  clopidogrel Tablet 75 milliGRAM(s) Oral daily  enalapril 10 milliGRAM(s) Oral daily  heparin  Injectable 5000 Unit(s) SubCutaneous every 12 hours  levothyroxine 25 MICROGram(s) Oral daily  lidocaine   Patch 2 Patch Transdermal daily  metoprolol succinate ER 50 milliGRAM(s) Oral daily  montelukast 10 milliGRAM(s) Oral daily  multivitamin 1 Tablet(s) Oral daily    MEDICATIONS  (PRN):  acetaminophen   Tablet .. 650 milliGRAM(s) Oral every 6 hours PRN Temp greater or equal to 38C (100.4F), Mild Pain (1 - 3)  ALBUTerol    90 MICROgram(s) HFA Inhaler 1 Puff(s) Inhalation every 6 hours PRN Shortness of Breath and/or Wheezing  ondansetron Injectable 4 milliGRAM(s) IV Push every 6 hours PRN Nausea      Allergies    No Known Allergies    Intolerances        Vital Signs Last 24 Hrs  T(C): 36.8 (03 Mar 2020 11:52), Max: 37.3 (03 Mar 2020 05:37)  T(F): 98.2 (03 Mar 2020 11:52), Max: 99.2 (03 Mar 2020 05:37)  HR: 63 (03 Mar 2020 11:52) (61 - 69)  BP: 135/48 (03 Mar 2020 11:52) (122/46 - 135/48)  BP(mean): --  RR: 18 (03 Mar 2020 11:52) (18 - 18)  SpO2: 99% (03 Mar 2020 11:52) (96% - 99%)    PHYSICAL EXAMINATION:  GENERAL: In no apparent distress, well nourished, and hydrated.  HEAD:  Atraumatic, Normocephalic  EYES: EOMI, PERRLA, conjunctiva and sclera clear  ENMT: No tonsillar erythema, exudates, or enlargement; Moist mucous membranes, Good dentition, No lesions  NECK: Supple and normal thyroid.  No JVD or carotid bruit.  Carotid pulse is 2+ bilaterally.  HEART: Regular rate and rhythm; No murmurs, rubs, or gallops.  PULMONARY: Clear to auscultation and perfusion.  No rales, wheezing, or rhonchi bilaterally.  ABDOMEN: Soft, Nontender, Nondistended; Bowel sounds present  EXTREMITIES:  2+ Peripheral Pulses, No clubbing, cyanosis, or edema  LYMPH: No lymphadenopathy noted  NEUROLOGICAL: Grossly nonfocal    LABS:                        13.6   14.01 )-----------( 209      ( 02 Mar 2020 06:47 )             41.3     03-02    144  |  114<H>  |  44<H>  ----------------------------<  131<H>  3.8   |  24  |  1.00    Ca    8.1<L>      02 Mar 2020 06:47  Phos  3.2     03-02  Mg     2.3     03-02    TPro  6.1  /  Alb  2.8<L>  /  TBili  0.6  /  DBili  x   /  AST  74<H>  /  ALT  45  /  AlkPhos  51  03-02      Urinalysis Basic - ( 01 Mar 2020 18:47 )    Color: Yellow / Appearance: Clear / S.020 / pH: x  Gluc: x / Ketone: Negative  / Bili: Negative / Urobili: Negative mg/dL   Blood: x / Protein: 500 mg/dL / Nitrite: Negative   Leuk Esterase: Negative / RBC: 3-5 /HPF / WBC Negative   Sq Epi: x / Non Sq Epi: Occasional / Bacteria: Few      CARDIAC MARKERS ( 02 Mar 2020 06:47 )  x     / x     / 932 U/L / x     / x      CARDIAC MARKERS ( 01 Mar 2020 18:00 )  0.191 ng/mL / x     / x     / x     / x      CARDIAC MARKERS ( 01 Mar 2020 15:36 )  0.241 ng/mL / x     / 2724 U/L / x     / x            RADIOLOGY & ADDITIONAL TESTS:  echo 3/2/20 pending

## 2020-03-03 NOTE — PROGRESS NOTE ADULT - ASSESSMENT
87 yo female with PMH HTN, CAD s/p stents, hypothyroidism, presented s/p unwitnessed fall, and noted with 2 sec pause on tele.    no further pauses since bb dose decreased, remains in sinus rhythm in 60s, no episodes of tachy .   send home with MCOT patch and outpatient EP follow up.

## 2020-03-03 NOTE — PROGRESS NOTE ADULT - ASSESSMENT
89 y/o female p/w      1) S/p traumatic fall, unwitnessed   - Monitor on tele, monitor revealing Wenckebach/pauses   Pt seen by EP  for episodes of adi and 2sec pause, pt is on metoprolol 100mg daily.   EP recs decrease bb dose by half  cont to monitor tele.    no further pauses since bb dose decreased, remains in sinus rhythm in 60s, no episodes of tachy .   plan to send to rehab with MCOT patch - EP to arrange for patch  - Trop mildly positive, likely demand from fall / ECG -   - UA / CT spine / CTH negative  - CXR w/ left lung opacity, no clinical indications of infection - monitor off abx at this time   - s/p IVF, CPK downtrending - DC IVFS   hold simvastatin 5mg for now, monitor LFTs - resume statin on discharge   - Echo pending  - Cardio f/u appreciated   - PT consult     2) Pre-renal CARMENZA    - S/p IVF - resolved  - Restart ACEi     3) Incidental CT findings  - Left mastoiditis / Otitis media  - asymptomatic at this time  - WBC improving, cont. PO Augmentin     4) CAD  - Cont. ASA / Plavix / BB    5) Hypothyroidism   - Cont. synthroid   - Check TSH    6) HTN  - Cont. CCB     7) DVT PPX  - SQ Heparin    DG to medical floors, discussed with team at IDRs  discussed with Radha, daughter, HCP, plan for dc to rehab on reduced dose of BB and MCOT patch    F/U Dr Lemus as OP

## 2020-03-04 ENCOUNTER — TRANSCRIPTION ENCOUNTER (OUTPATIENT)
Age: 85
End: 2020-03-04

## 2020-03-04 VITALS
TEMPERATURE: 98 F | DIASTOLIC BLOOD PRESSURE: 52 MMHG | OXYGEN SATURATION: 96 % | SYSTOLIC BLOOD PRESSURE: 163 MMHG | RESPIRATION RATE: 16 BRPM | HEART RATE: 58 BPM

## 2020-03-04 LAB
ANION GAP SERPL CALC-SCNC: 5 MMOL/L — SIGNIFICANT CHANGE UP (ref 5–17)
BUN SERPL-MCNC: 21 MG/DL — SIGNIFICANT CHANGE UP (ref 7–23)
CALCIUM SERPL-MCNC: 8.4 MG/DL — LOW (ref 8.5–10.1)
CHLORIDE SERPL-SCNC: 111 MMOL/L — HIGH (ref 96–108)
CO2 SERPL-SCNC: 24 MMOL/L — SIGNIFICANT CHANGE UP (ref 22–31)
CREAT SERPL-MCNC: 0.71 MG/DL — SIGNIFICANT CHANGE UP (ref 0.5–1.3)
GLUCOSE SERPL-MCNC: 115 MG/DL — HIGH (ref 70–99)
HCT VFR BLD CALC: 40.8 % — SIGNIFICANT CHANGE UP (ref 34.5–45)
HGB BLD-MCNC: 13.3 G/DL — SIGNIFICANT CHANGE UP (ref 11.5–15.5)
MAGNESIUM SERPL-MCNC: 2.6 MG/DL — SIGNIFICANT CHANGE UP (ref 1.6–2.6)
MCHC RBC-ENTMCNC: 30.9 PG — SIGNIFICANT CHANGE UP (ref 27–34)
MCHC RBC-ENTMCNC: 32.6 GM/DL — SIGNIFICANT CHANGE UP (ref 32–36)
MCV RBC AUTO: 94.9 FL — SIGNIFICANT CHANGE UP (ref 80–100)
PLATELET # BLD AUTO: 200 K/UL — SIGNIFICANT CHANGE UP (ref 150–400)
POTASSIUM SERPL-MCNC: 4.1 MMOL/L — SIGNIFICANT CHANGE UP (ref 3.5–5.3)
POTASSIUM SERPL-SCNC: 4.1 MMOL/L — SIGNIFICANT CHANGE UP (ref 3.5–5.3)
RBC # BLD: 4.3 M/UL — SIGNIFICANT CHANGE UP (ref 3.8–5.2)
RBC # FLD: 12.5 % — SIGNIFICANT CHANGE UP (ref 10.3–14.5)
SODIUM SERPL-SCNC: 140 MMOL/L — SIGNIFICANT CHANGE UP (ref 135–145)
WBC # BLD: 7.64 K/UL — SIGNIFICANT CHANGE UP (ref 3.8–10.5)
WBC # FLD AUTO: 7.64 K/UL — SIGNIFICANT CHANGE UP (ref 3.8–10.5)

## 2020-03-04 PROCEDURE — 99239 HOSP IP/OBS DSCHRG MGMT >30: CPT

## 2020-03-04 RX ORDER — LEVOTHYROXINE SODIUM 125 MCG
1 TABLET ORAL
Qty: 0 | Refills: 0 | DISCHARGE

## 2020-03-04 RX ORDER — LIDOCAINE 4 G/100G
1 CREAM TOPICAL
Qty: 0 | Refills: 0 | DISCHARGE
Start: 2020-03-04

## 2020-03-04 RX ORDER — ACETAMINOPHEN 500 MG
2 TABLET ORAL
Qty: 0 | Refills: 0 | DISCHARGE
Start: 2020-03-04

## 2020-03-04 RX ORDER — LEVOTHYROXINE SODIUM 125 MCG
1 TABLET ORAL
Qty: 0 | Refills: 0 | DISCHARGE
Start: 2020-03-04

## 2020-03-04 RX ORDER — AMLODIPINE BESYLATE 2.5 MG/1
1 TABLET ORAL
Qty: 0 | Refills: 0 | DISCHARGE
Start: 2020-03-04

## 2020-03-04 RX ORDER — ASPIRIN/CALCIUM CARB/MAGNESIUM 324 MG
1 TABLET ORAL
Qty: 0 | Refills: 0 | DISCHARGE
Start: 2020-03-04

## 2020-03-04 RX ORDER — AMLODIPINE BESYLATE 2.5 MG/1
1 TABLET ORAL
Qty: 0 | Refills: 0 | DISCHARGE

## 2020-03-04 RX ORDER — METOPROLOL TARTRATE 50 MG
1 TABLET ORAL
Qty: 0 | Refills: 0 | DISCHARGE
Start: 2020-03-04

## 2020-03-04 RX ORDER — CLOPIDOGREL BISULFATE 75 MG/1
1 TABLET, FILM COATED ORAL
Qty: 0 | Refills: 0 | DISCHARGE
Start: 2020-03-04

## 2020-03-04 RX ORDER — CLOPIDOGREL BISULFATE 75 MG/1
1 TABLET, FILM COATED ORAL
Qty: 0 | Refills: 0 | DISCHARGE

## 2020-03-04 RX ORDER — METOPROLOL TARTRATE 50 MG
1 TABLET ORAL
Qty: 0 | Refills: 0 | DISCHARGE

## 2020-03-04 RX ORDER — ASPIRIN/CALCIUM CARB/MAGNESIUM 324 MG
325 TABLET ORAL
Qty: 0 | Refills: 0 | DISCHARGE

## 2020-03-04 RX ADMIN — Medication 25 MICROGRAM(S): at 05:46

## 2020-03-04 RX ADMIN — Medication 325 MILLIGRAM(S): at 12:29

## 2020-03-04 RX ADMIN — Medication 10 MILLIGRAM(S): at 05:47

## 2020-03-04 RX ADMIN — MONTELUKAST 10 MILLIGRAM(S): 4 TABLET, CHEWABLE ORAL at 12:29

## 2020-03-04 RX ADMIN — Medication 1 TABLET(S): at 05:46

## 2020-03-04 RX ADMIN — Medication 50 MILLIGRAM(S): at 05:46

## 2020-03-04 RX ADMIN — Medication 1 TABLET(S): at 11:12

## 2020-03-04 RX ADMIN — Medication 1 TABLET(S): at 11:13

## 2020-03-04 RX ADMIN — HEPARIN SODIUM 5000 UNIT(S): 5000 INJECTION INTRAVENOUS; SUBCUTANEOUS at 05:46

## 2020-03-04 RX ADMIN — LIDOCAINE 2 PATCH: 4 CREAM TOPICAL at 12:29

## 2020-03-04 RX ADMIN — CLOPIDOGREL BISULFATE 75 MILLIGRAM(S): 75 TABLET, FILM COATED ORAL at 11:13

## 2020-03-04 NOTE — DISCHARGE NOTE NURSING/CASE MANAGEMENT/SOCIAL WORK - PATIENT PORTAL LINK FT
You can access the FollowMyHealth Patient Portal offered by [facility] by registering at the following website: [email or web address]. By joining [facility]’s FollowMyHealth portal, you will also be able to view your health information using other applications (apps) compatible with our system.

## 2020-03-04 NOTE — DISCHARGE NOTE PROVIDER - CARE PROVIDERS DIRECT ADDRESSES
,DirectAddress_Unknown,[email or web address]

## 2020-03-04 NOTE — DISCHARGE NOTE PROVIDER - NSDCCPCAREPLAN_GEN_ALL_CORE_FT
PRINCIPAL DISCHARGE DIAGNOSIS  Diagnosis: Abnormal EKG  Assessment and Plan of Treatment:       SECONDARY DISCHARGE DIAGNOSES  Diagnosis: Rhabdomyolysis  Assessment and Plan of Treatment:     Diagnosis: Acute renal failure  Assessment and Plan of Treatment:     Diagnosis: Elevated troponin  Assessment and Plan of Treatment:

## 2020-03-04 NOTE — DISCHARGE NOTE PROVIDER - NSDCMRMEDTOKEN_GEN_ALL_CORE_FT
acetaminophen 325 mg oral tablet: 2 tab(s) orally 2 times a day, As Needed - 3)  albuterol 90 mcg/inh inhalation aerosol: 2 puff(s) inhaled 4 times a day, As Needed -for shortness of breath and/or wheezing   amLODIPine 10 mg oral tablet: 1 tab(s) orally once a day (at bedtime)  amoxicillin-clavulanate 875 mg-125 mg oral tablet: 1 tab(s) orally 2 times a day  aspirin 325 mg oral delayed release tablet: 1 tab(s) orally once a day  calcium-vitamin D 500 mg-200 intl units (5 mcg) oral tablet: 1 tab(s) orally once a day  clopidogrel 75 mg oral tablet: 1 tab(s) orally once a day  enalapril 10 mg oral tablet: 1 tab(s) orally once a day  levothyroxine 25 mcg (0.025 mg) oral tablet: 1 tab(s) orally once a day  lidocaine 5% topical film: Apply topically to affected area once a day  metoprolol succinate 50 mg oral tablet, extended release: 1 tab(s) orally once a day  Singulair 10 mg oral tablet: 1 tab(s) orally once a day

## 2020-03-04 NOTE — DISCHARGE NOTE PROVIDER - HOSPITAL COURSE
HPI: 87 y/o female, Montenegrin speaking only, with PMhx of hypothyroidism, HTN, CAD with stents, presents from home s/p unwitnessed fall. As per the daughter Acacia Mcclendon, who provided majority of the hx, patient was last seen on Friday. The family could not reach her via phone, went to her house, where she found down on the floor, unknown down time. Patient herself is very poor historian. Pt denies fever, chills, cough, CP, SOB, HA, dizziness, N/V/D. No other constitutional symptoms on admission    In ED found to have CK 2724, UA normal    HOSPITAL COURSE: Pt was admitted, tele monitoring revealed Wenckebach/pauses, EP recs  decrease beta blocker dose by half    no further pauses since bb dose decreased, remains in sinus rhythm in 60s, no episodes of tachy , stable to be discharged to rehab with MCOT/ZIO patch. Cont augmentin for 5 more days for incidental findings on CT left mastoiditis/otitis media. CK down with IVFs. Stable for dc to rehab. Details below.        3/4 - no cp palps lightheadedness, knee pain better with lidocaine patches, daughter at bedside    PHYSICAL EXAM:    Constitutional: Awake and alert, well-developed - daughter at bedside interpreting     HEENT: EOMI, Normal Hearing, MMM    Respiratory: Breath sounds are clear bilaterally, No wheezing, rales or rhonchi    Cardiovascular: S1 and S2, regular rate and rhythm, no Murmurs, gallops or rubs    Gastrointestinal: Bowel Sounds present, soft, nontender, nondistended, no guarding, no rebound    Extremities: No peripheral edema    Vascular: 2+ peripheral pulses    Neurological: A/O x 3, no focal deficits - son bedside interpreting     Musculoskeletal: 5/5 strength b/l upper and lower extremities    Skin: B/L heel blisters     RADIOLOGY/EKG:    3/2 - NSR - HR 70-80s, pauses    3/3 - NSR no further pauses/mobitz etc     3/4 - off tele, has zio patch    < from: CT Cervical Spine No Cont (03.01.20 @ 16:44) >    No CT evidence of acute traumatic injury to the cervical spine.    < from: CT Head No Cont (03.01.20 @ 16:44) >    LEFT mastoiditis and otitis media. No acute intracranial findings.    Moderate atrophy and chronic white matter microvascular ischemic changes as described.     < from: Xray Chest 1 View AP/PA. (03.01.20 @ 16:25) >    Chest: Opacity at the periphery of the left lung base could be due to overlying soft tissues, airspace disease or effusion.    < from: TTE Echo Complete w/o contrast w/ Doppler (03.02.20 @ 14:01) >     Normal appearing left atrium. The left ventricle is normal in size, wall motion and contractility.     Mild concentric left ventricular hypertrophy is present. Estimated left ventricular ejection fraction is 55-60 %.    LABS: All Labs Reviewed:                        13.3     7.64  )-----------( 200      ( 04 Mar 2020 07:31 )               40.8     A/P    1) S/p traumatic fall, unwitnessed     - Monitor on tele, monitor revealing Wenckebach/pauses     Pt seen by EP  for episodes of adi and 2sec pause, pt is on metoprolol 100mg daily.     EP recs decrease bb dose by half    no further pauses since bb dose decreased, remains in sinus rhythm in 60s, no episodes of tachy .     plan to send to rehab with MCOT/ZIO patch - EP to arrange for patch    - UA / CT spine / CTH negative    - CXR w/ left lung opacity, no clinical indications of infection - monitor off abx at this time     - s/p IVF, CPK downtrending - DC IVFS     2) Pre-renal CARMENZA      - S/p IVF - resolved    - Restart ACEi     3) Incidental CT findings    - Left mastoiditis / Otitis media    - asymptomatic at this time,  cont. PO Augmentin     4) CAD    - Cont. ASA / Plavix / BB    5) Hypothyroidism     - Cont. synthroid     - Check TSH    6) HTN    - Cont. home meds    7) DVT PPX    - SQ Heparin        discussed with RN and Radha, daughter, HCP, plan for dc to rehab on reduced dose of BB and MCOT patch      F/U Dr Lemus as OP    time spent on discharge - 55 mins

## 2020-03-04 NOTE — DISCHARGE NOTE PROVIDER - CARE PROVIDER_API CALL
Margot Maki)  Internal Medicine  [address] [place] [address]  Phone: [phone]  Fax: [phone]  Follow Up Time:     Allan Toussaint)  Cardiology; Internal Medicine  [address] [place] [address]  Phone: [phone]  Fax: [phone]  Follow Up Time:

## 2020-03-05 ENCOUNTER — INPATIENT (INPATIENT)
Facility: HOSPITAL | Age: 85
LOS: 5 days | Discharge: NURSING, REHAB OR LONG-TERM CARE | DRG: 378 | End: 2020-03-11
Attending: FAMILY MEDICINE | Admitting: FAMILY MEDICINE
Payer: MEDICARE

## 2020-03-05 VITALS
WEIGHT: 188.05 LBS | TEMPERATURE: 98 F | DIASTOLIC BLOOD PRESSURE: 40 MMHG | SYSTOLIC BLOOD PRESSURE: 118 MMHG | RESPIRATION RATE: 18 BRPM | HEIGHT: 62 IN | OXYGEN SATURATION: 100 % | HEART RATE: 72 BPM

## 2020-03-05 DIAGNOSIS — K92.2 GASTROINTESTINAL HEMORRHAGE, UNSPECIFIED: ICD-10-CM

## 2020-03-05 LAB
ALBUMIN SERPL ELPH-MCNC: 2 G/DL — LOW (ref 3.3–5)
ALP SERPL-CCNC: 35 U/L — LOW (ref 40–120)
ALT FLD-CCNC: 30 U/L — SIGNIFICANT CHANGE UP (ref 12–78)
ANION GAP SERPL CALC-SCNC: 5 MMOL/L — SIGNIFICANT CHANGE UP (ref 5–17)
APTT BLD: 27.1 SEC — LOW (ref 27.5–36.3)
AST SERPL-CCNC: 23 U/L — SIGNIFICANT CHANGE UP (ref 15–37)
BASOPHILS # BLD AUTO: 0.02 K/UL — SIGNIFICANT CHANGE UP (ref 0–0.2)
BASOPHILS NFR BLD AUTO: 0.2 % — SIGNIFICANT CHANGE UP (ref 0–2)
BILIRUB SERPL-MCNC: 0.2 MG/DL — SIGNIFICANT CHANGE UP (ref 0.2–1.2)
BUN SERPL-MCNC: 44 MG/DL — HIGH (ref 7–23)
CALCIUM SERPL-MCNC: 7.5 MG/DL — LOW (ref 8.5–10.1)
CHLORIDE SERPL-SCNC: 114 MMOL/L — HIGH (ref 96–108)
CO2 SERPL-SCNC: 25 MMOL/L — SIGNIFICANT CHANGE UP (ref 22–31)
CREAT SERPL-MCNC: 0.73 MG/DL — SIGNIFICANT CHANGE UP (ref 0.5–1.3)
EOSINOPHIL # BLD AUTO: 0.09 K/UL — SIGNIFICANT CHANGE UP (ref 0–0.5)
EOSINOPHIL NFR BLD AUTO: 1 % — SIGNIFICANT CHANGE UP (ref 0–6)
GLUCOSE SERPL-MCNC: 120 MG/DL — HIGH (ref 70–99)
HCT VFR BLD CALC: 23 % — LOW (ref 34.5–45)
HGB BLD-MCNC: 7.5 G/DL — LOW (ref 11.5–15.5)
IMM GRANULOCYTES NFR BLD AUTO: 0.5 % — SIGNIFICANT CHANGE UP (ref 0–1.5)
INR BLD: 1.17 RATIO — HIGH (ref 0.88–1.16)
LYMPHOCYTES # BLD AUTO: 1.36 K/UL — SIGNIFICANT CHANGE UP (ref 1–3.3)
LYMPHOCYTES # BLD AUTO: 15.4 % — SIGNIFICANT CHANGE UP (ref 13–44)
MCHC RBC-ENTMCNC: 31.3 PG — SIGNIFICANT CHANGE UP (ref 27–34)
MCHC RBC-ENTMCNC: 32.6 GM/DL — SIGNIFICANT CHANGE UP (ref 32–36)
MCV RBC AUTO: 95.8 FL — SIGNIFICANT CHANGE UP (ref 80–100)
MONOCYTES # BLD AUTO: 0.98 K/UL — HIGH (ref 0–0.9)
MONOCYTES NFR BLD AUTO: 11.1 % — SIGNIFICANT CHANGE UP (ref 2–14)
NEUTROPHILS # BLD AUTO: 6.32 K/UL — SIGNIFICANT CHANGE UP (ref 1.8–7.4)
NEUTROPHILS NFR BLD AUTO: 71.8 % — SIGNIFICANT CHANGE UP (ref 43–77)
PLATELET # BLD AUTO: 157 K/UL — SIGNIFICANT CHANGE UP (ref 150–400)
POTASSIUM SERPL-MCNC: 4.4 MMOL/L — SIGNIFICANT CHANGE UP (ref 3.5–5.3)
POTASSIUM SERPL-SCNC: 4.4 MMOL/L — SIGNIFICANT CHANGE UP (ref 3.5–5.3)
PROT SERPL-MCNC: 4.5 GM/DL — LOW (ref 6–8.3)
PROTHROM AB SERPL-ACNC: 13 SEC — HIGH (ref 10–12.9)
RBC # BLD: 2.4 M/UL — LOW (ref 3.8–5.2)
RBC # FLD: 12.3 % — SIGNIFICANT CHANGE UP (ref 10.3–14.5)
SODIUM SERPL-SCNC: 144 MMOL/L — SIGNIFICANT CHANGE UP (ref 135–145)
TROPONIN I SERPL-MCNC: 0.06 NG/ML — HIGH (ref 0.01–0.04)
WBC # BLD: 8.81 K/UL — SIGNIFICANT CHANGE UP (ref 3.8–10.5)
WBC # FLD AUTO: 8.81 K/UL — SIGNIFICANT CHANGE UP (ref 3.8–10.5)

## 2020-03-05 PROCEDURE — 86923 COMPATIBILITY TEST ELECTRIC: CPT

## 2020-03-05 PROCEDURE — 97116 GAIT TRAINING THERAPY: CPT | Mod: GP

## 2020-03-05 PROCEDURE — 97162 PT EVAL MOD COMPLEX 30 MIN: CPT | Mod: GP

## 2020-03-05 PROCEDURE — 71045 X-RAY EXAM CHEST 1 VIEW: CPT | Mod: 26

## 2020-03-05 PROCEDURE — P9040: CPT

## 2020-03-05 PROCEDURE — 93005 ELECTROCARDIOGRAM TRACING: CPT

## 2020-03-05 PROCEDURE — 80048 BASIC METABOLIC PNL TOTAL CA: CPT

## 2020-03-05 PROCEDURE — 99223 1ST HOSP IP/OBS HIGH 75: CPT

## 2020-03-05 PROCEDURE — 36415 COLL VENOUS BLD VENIPUNCTURE: CPT

## 2020-03-05 PROCEDURE — 84100 ASSAY OF PHOSPHORUS: CPT

## 2020-03-05 PROCEDURE — 85027 COMPLETE CBC AUTOMATED: CPT

## 2020-03-05 PROCEDURE — 87086 URINE CULTURE/COLONY COUNT: CPT

## 2020-03-05 PROCEDURE — 81003 URINALYSIS AUTO W/O SCOPE: CPT

## 2020-03-05 PROCEDURE — 88305 TISSUE EXAM BY PATHOLOGIST: CPT

## 2020-03-05 PROCEDURE — 81001 URINALYSIS AUTO W/SCOPE: CPT

## 2020-03-05 PROCEDURE — P9016: CPT

## 2020-03-05 PROCEDURE — 83735 ASSAY OF MAGNESIUM: CPT

## 2020-03-05 PROCEDURE — 85025 COMPLETE CBC W/AUTO DIFF WBC: CPT

## 2020-03-05 PROCEDURE — 88312 SPECIAL STAINS GROUP 1: CPT

## 2020-03-05 PROCEDURE — 36430 TRANSFUSION BLD/BLD COMPNT: CPT

## 2020-03-05 PROCEDURE — 70450 CT HEAD/BRAIN W/O DYE: CPT | Mod: 26

## 2020-03-05 PROCEDURE — 97530 THERAPEUTIC ACTIVITIES: CPT | Mod: GP

## 2020-03-05 PROCEDURE — C9113: CPT

## 2020-03-05 PROCEDURE — 93010 ELECTROCARDIOGRAM REPORT: CPT

## 2020-03-05 RX ORDER — MONTELUKAST 4 MG/1
1 TABLET, CHEWABLE ORAL
Qty: 0 | Refills: 0 | DISCHARGE

## 2020-03-05 RX ORDER — ALBUTEROL 90 UG/1
2 AEROSOL, METERED ORAL EVERY 6 HOURS
Refills: 0 | Status: DISCONTINUED | OUTPATIENT
Start: 2020-03-05 | End: 2020-03-11

## 2020-03-05 RX ORDER — PANTOPRAZOLE SODIUM 20 MG/1
80 TABLET, DELAYED RELEASE ORAL ONCE
Refills: 0 | Status: COMPLETED | OUTPATIENT
Start: 2020-03-05 | End: 2020-03-05

## 2020-03-05 RX ORDER — SODIUM CHLORIDE 9 MG/ML
1000 INJECTION INTRAMUSCULAR; INTRAVENOUS; SUBCUTANEOUS ONCE
Refills: 0 | Status: COMPLETED | OUTPATIENT
Start: 2020-03-05 | End: 2020-03-05

## 2020-03-05 RX ORDER — MONTELUKAST 4 MG/1
10 TABLET, CHEWABLE ORAL DAILY
Refills: 0 | Status: DISCONTINUED | OUTPATIENT
Start: 2020-03-05 | End: 2020-03-11

## 2020-03-05 RX ORDER — PANTOPRAZOLE SODIUM 20 MG/1
8 TABLET, DELAYED RELEASE ORAL
Qty: 80 | Refills: 0 | Status: DISCONTINUED | OUTPATIENT
Start: 2020-03-05 | End: 2020-03-06

## 2020-03-05 RX ORDER — SIMVASTATIN 20 MG/1
1 TABLET, FILM COATED ORAL
Qty: 0 | Refills: 0 | DISCHARGE

## 2020-03-05 RX ORDER — CHOLECALCIFEROL (VITAMIN D3) 125 MCG
1 CAPSULE ORAL
Qty: 0 | Refills: 0 | DISCHARGE

## 2020-03-05 RX ORDER — AMLODIPINE BESYLATE 2.5 MG/1
10 TABLET ORAL AT BEDTIME
Refills: 0 | Status: DISCONTINUED | OUTPATIENT
Start: 2020-03-05 | End: 2020-03-11

## 2020-03-05 RX ORDER — LANOLIN ALCOHOL/MO/W.PET/CERES
5 CREAM (GRAM) TOPICAL AT BEDTIME
Refills: 0 | Status: DISCONTINUED | OUTPATIENT
Start: 2020-03-05 | End: 2020-03-11

## 2020-03-05 RX ORDER — METOPROLOL TARTRATE 50 MG
100 TABLET ORAL DAILY
Refills: 0 | Status: DISCONTINUED | OUTPATIENT
Start: 2020-03-05 | End: 2020-03-11

## 2020-03-05 RX ORDER — SIMVASTATIN 20 MG/1
5 TABLET, FILM COATED ORAL AT BEDTIME
Refills: 0 | Status: DISCONTINUED | OUTPATIENT
Start: 2020-03-05 | End: 2020-03-11

## 2020-03-05 RX ORDER — LEVOTHYROXINE SODIUM 125 MCG
25 TABLET ORAL DAILY
Refills: 0 | Status: DISCONTINUED | OUTPATIENT
Start: 2020-03-05 | End: 2020-03-11

## 2020-03-05 RX ADMIN — SODIUM CHLORIDE 1000 MILLILITER(S): 9 INJECTION INTRAMUSCULAR; INTRAVENOUS; SUBCUTANEOUS at 14:28

## 2020-03-05 RX ADMIN — Medication 5 MILLIGRAM(S): at 22:43

## 2020-03-05 RX ADMIN — PANTOPRAZOLE SODIUM 10 MG/HR: 20 TABLET, DELAYED RELEASE ORAL at 23:58

## 2020-03-05 RX ADMIN — AMLODIPINE BESYLATE 10 MILLIGRAM(S): 2.5 TABLET ORAL at 22:43

## 2020-03-05 RX ADMIN — PANTOPRAZOLE SODIUM 80 MILLIGRAM(S): 20 TABLET, DELAYED RELEASE ORAL at 14:36

## 2020-03-05 NOTE — ED ADULT NURSE NOTE - OBJECTIVE STATEMENT
Pt brought in by EMS, a&o x3, s/p syncopal episode 2 hours PTA.  Pts family reports pt had multiple episodes of black loose stool today and had a syncopal episode in shower.  Denies head injury, - LOC.  Pt is on ASA and plavix.  Pt was admitted to  3/1-3/4 s/p fall.   Hx of HTN, GERD, asthma.

## 2020-03-05 NOTE — ED PROVIDER NOTE - CARE PLAN
Principal Discharge DX:	UGIB (upper gastrointestinal bleed)  Secondary Diagnosis:	Syncope and collapse

## 2020-03-05 NOTE — ED PROVIDER NOTE - OBJECTIVE STATEMENT
87 y/o female with a PMHx of GERD, HLD, HTN, asthma, CAD presents to the ED s/p syncopal episode 2 hours PTA. Per family at bedside pt had multiple episodes of dark stool yesterday and today, prior to syncopal episode. Denies head injury, fever. Pt was seen at [facility] on 03/01/2020 s/p fall and d/c'd yesterday. On Plavix. NKDA. PCP: Margot Maki. History obtained by family at bedside. 89 y/o female with a PMHx of GERD, HLD, HTN, asthma, CAD presents to the ED s/p syncopal episode 2 hours PTA. Per family at bedside pt had multiple episodes of dark bloody stoosl yesterday and today, prior to syncopal episode. Denies head injury, fever. Pt was seen at [facility] on 03/01/2020 s/p fall and d/c'd yesterday. On Plavix. NKDA. PCP: Margot Maki. History obtained by family at bedside. 87 y/o female with a PMHx of GERD, HLD, HTN, asthma, CAD presents to the ED s/p syncopal episode 2 hours PTA. patient had multiple episodes of "black stool" then passed out today; Denies head injury, fever. Pt was seen at [facility] on 03/01/2020 s/p fall and d/c'd yesterday. On Plavix and aspirin NKDA. PCP: Margot Maki. History obtained by family at bedside.

## 2020-03-05 NOTE — ED ADULT TRIAGE NOTE - CHIEF COMPLAINT QUOTE
Patient presents with episode of dark stool last night. Patient had episode of bright red blood stool this morning. Patient had witnessed syncope this morning, denies hitting head

## 2020-03-05 NOTE — ED PROVIDER NOTE - PROGRESS NOTE DETAILS
Talia Smith for attending Dr. Eli: [id number], expiration 8/31/2020. Black stool, Guaiac +, QS + Sreedhar DO: pending H&H at this time; patient to be admitted. Family is aware and agreeable to plan Sreedhar DO: hemoglobin: 7.5; 2 units prbcs ordered; dr. pacheco to consult; endorsed to dr. d'amico for admission

## 2020-03-05 NOTE — CONSULT NOTE ADULT - ASSESSMENT
87yo female with UGI bleed on asa/plavix s/p recent rhabdomyolysis  likely PUD  protonix drip  serial h/h and transfuse as needed  EGD tomorrow with dr foster

## 2020-03-05 NOTE — CONSULT NOTE ADULT - SUBJECTIVE AND OBJECTIVE BOX
Patient is a 88y old  Female who presents with a chief complaint of     HPI:  87yo female with multiple medical issues s/p recent admission for rhabdomyolysis just d/c to rehab  She was sent back due to multiple episodes of melena  In ER she has passed more melena with hgb 7.6 down from 13 yesterday  She denies abdominal pain, no prior hx gi issues  She speaks largely Swiss and history obtained from family    PAST MEDICAL & SURGICAL HISTORY:  GERD (gastroesophageal reflux disease)  Hyperlipidemia  HTN (hypertension)  Asthma  CAD (coronary artery disease)  No significant past surgical history    MEDICATIONS  (STANDING):  pantoprazole Infusion 8 mG/Hr (10 mL/Hr) IV Continuous <Continuous>    Allergies  No Known Allergies    SOCIAL HISTORY: rare etoh, came from rehab    FAMILY HISTORY:  Family history of heart attack    REVIEW OF SYSTEMS:  she is weak and a little confused - not obtained    Vital Signs Last 24 Hrs  T(C): 36.7 (05 Mar 2020 16:55), Max: 36.7 (05 Mar 2020 16:55)  T(F): 98 (05 Mar 2020 16:55), Max: 98 (05 Mar 2020 16:55)  HR: 86 (05 Mar 2020 16:55) (72 - 86)  BP: 116/44 (05 Mar 2020 16:55) (116/44 - 121/49)  BP(mean): 70 (05 Mar 2020 15:33) (70 - 70)  RR: 15 (05 Mar 2020 16:55) (15 - 22)  SpO2: 100% (05 Mar 2020 16:55) (100% - 100%)    PHYSICAL EXAM:  Constitutional: NAD, elderly female  HEENT: MMM  Neck: No LAD  Respiratory: CTA  Cardiovascular: S1 and S2  Gastrointestinal: BS+, soft, NT/ND  Extremities: No peripheral edema  Vascular: + peripheral pulses  Neurological: no focal deficits  Psychiatric: Normal mood, normal affect  Skin: No rashes    LABS:                        7.5    8.81  )-----------( 157      ( 05 Mar 2020 14:56 )             23.0     03-05    144  |  114<H>  |  44<H>  ----------------------------<  120<H>  4.4   |  25  |  0.73    Ca    7.5<L>      05 Mar 2020 14:56  Mg     2.6     03-04    TPro  4.5<L>  /  Alb  2.0<L>  /  TBili  0.2  /  DBili  x   /  AST  23  /  ALT  30  /  AlkPhos  35<L>  03-05    PT/INR - ( 05 Mar 2020 14:56 )   PT: 13.0 sec;   INR: 1.17 ratio         PTT - ( 05 Mar 2020 14:56 )  PTT:27.1 sec  LIVER FUNCTIONS - ( 05 Mar 2020 14:56 )  Alb: 2.0 g/dL / Pro: 4.5 gm/dL / ALK PHOS: 35 U/L / ALT: 30 U/L / AST: 23 U/L / GGT: x             RADIOLOGY & ADDITIONAL STUDIES:

## 2020-03-05 NOTE — ED PROVIDER NOTE - CLINICAL SUMMARY MEDICAL DECISION MAKING FREE TEXT BOX
89 y/o female presents with bloody stool s/p syncope. Plan: EKG, labs, CT, admit 87 y/o female presents with melena, syncope Plan: EKG, labs, CT, admit

## 2020-03-05 NOTE — ED ADULT NURSE NOTE - NSIMPLEMENTINTERV_GEN_ALL_ED
Implemented All Fall with Harm Risk Interventions:  [place] to call system. Call bell, personal items and telephone within reach. Instruct patient to call for assistance. Room bathroom lighting operational. Non-slip footwear when patient is off stretcher. Physically safe environment: no spills, clutter or unnecessary equipment. Stretcher in lowest position, wheels locked, appropriate side rails in place. Provide visual cue, wrist band, yellow gown, etc. Monitor gait and stability. Monitor for mental status changes and reorient to person, place, and time. Review medications for side effects contributing to fall risk. Reinforce activity limits and safety measures with patient and family. Provide visual clues: red socks.

## 2020-03-05 NOTE — H&P ADULT - HISTORY OF PRESENT ILLNESS
CC:  Patient is a 88y old  Female who presents with a chief complaint of melena (05 Mar 2020 17:01)      HPI:  patient admitted to  03/01-03/04/20 due to fall, rhabdomyolysis and elevated troponin.  additionally, treated for mastoiditis/otitis media and treated w/ Augmentin.  was found to have cardiac rhythm pauses, evaluated by EP, BB dose decreased and patient discharged to [facility] w/ MCOT patch.    while at [facility], she had a large melanotic bowel movement while being showered by staff members.  transported back to  for further evaluation.    in ED, Hbg 7.5g/dL.  ED physician ordered 2 units of PRBCs and Protonix gtt was started.  GI evaluated in ED and plans to perform EGD in the AM.    ROS:      all other review of systems are negative unless indicated above.    PAST MEDICAL & SURGICAL HISTORY:  GERD (gastroesophageal reflux disease)  Hyperlipidemia  HTN (hypertension)  Asthma  CAD (coronary artery disease)  No significant past surgical history      Allergies    No Known Allergies    Intolerances        Home Medications:  amLODIPine 10 mg oral tablet: 1 tab(s) orally once a day (at bedtime) (05 Mar 2020 15:41)  aspirin 325 mg oral delayed release tablet: 1 tab(s) orally once a day (05 Mar 2020 15:41)  calcium-vitamin D 500 mg-200 intl units (5 mcg) oral tablet: 1 tab(s) orally once a day (05 Mar 2020 15:41)  clopidogrel 75 mg oral tablet: 1 tab(s) orally once a day (05 Mar 2020 15:41)  enalapril 10 mg oral tablet: 1 tab(s) orally once a day (05 Mar 2020 15:41)  levothyroxine 25 mcg (0.025 mg) oral tablet: 1 tab(s) orally once a day (05 Mar 2020 15:41)  Metoprolol Succinate  mg oral tablet, extended release: 1 tab(s) orally once a day (05 Mar 2020 15:41)  simvastatin 5 mg oral tablet: 1 tab(s) orally once a day (at bedtime) (05 Mar 2020 15:41)  Singulair 10 mg oral tablet: 1 tab(s) orally once a day (05 Mar 2020 15:41)      Social History:  prior to fall, patient lived alone and independently in the community.  no tobacco or EtOH.    FAMILY HISTORY:  Family history of heart attack      Vital Signs Last 24 Hrs  T(C): 36.6 (05 Mar 2020 17:15), Max: 36.7 (05 Mar 2020 16:55)  T(F): 97.8 (05 Mar 2020 17:15), Max: 98 (05 Mar 2020 16:55)  HR: 86 (05 Mar 2020 17:15) (72 - 86)  BP: 141/61 (05 Mar 2020 17:15) (116/44 - 141/61)  BP(mean): 70 (05 Mar 2020 15:33) (70 - 70)  RR: 15 (05 Mar 2020 17:15) (15 - 22)  SpO2: 100% (05 Mar 2020 17:15) (100% - 100%)    Constitutional: ill appearing wf.  HEENT: PERRL, EOMI, MMM.  Neck: Soft and supple, No carotid bruit, No JVD  Respiratory: Breath sounds are clear bilaterally, No wheezing, rales or rhonchi  Cardiovascular: S1 and S2, regular rate and rhythm, no murmur, rub or gallop.  Gastrointestinal: Bowel Sounds present, soft, nontender, nondistended, no guarding, no rebound, no mass.  Extremities: No peripheral edema  Vascular: 2+ peripheral pulses  Neurological: A/O x 3, no focal deficits  Musculoskeletal: 5/5 strength b/l upper and lower extremities  Skin:  (+) pallor.                             7.5    8.81  )-----------( 157      ( 05 Mar 2020 14:56 )             23.0       PT/INR - ( 05 Mar 2020 14:56 )   PT: 13.0 sec;   INR: 1.17 ratio         PTT - ( 05 Mar 2020 14:56 )  PTT:27.1 sec    03-05    144  |  114<H>  |  44<H>  ----------------------------<  120<H>  4.4   |  25  |  0.73    Ca    7.5<L>      05 Mar 2020 14:56  Mg     2.6     03-04    TPro  4.5<L>  /  Alb  2.0<L>  /  TBili  0.2  /  DBili  x   /  AST  23  /  ALT  30  /  AlkPhos  35<L>  03-05      LIVER FUNCTIONS - ( 05 Mar 2020 14:56 )  Alb: 2.0 g/dL / Pro: 4.5 gm/dL / ALK PHOS: 35 U/L / ALT: 30 U/L / AST: 23 U/L / GGT: x             CARDIAC MARKERS ( 05 Mar 2020 14:56 )  0.055 ng/mL / x     / x     / x     / x        < from: Xray Chest 1 View- PORTABLE-Urgent (03.05.20 @ 14:49) >  IMPRESSION: Left basilar opacification representing atelectasis, effusion, or pneumonia.        < end of copied text >      < from: CT Head No Cont (03.05.20 @ 14:17) >  IMPRESSION:  Mild chronic microvascular changes without evidence of an acute transcortical infarction or hemorrhage. MR is a more sensitive imaging modality for the evaluation of an acute infarction. Left-sided otomastoiditis, unchanged.      < end of copied text >    < from: TTE Echo Complete w/o contrast w/ Doppler (03.02.20 @ 14:01) >  Impression     Summary     EA reversal of the mitral inflow consistent with reduced compliance of the   left ventricle.   Mild (1+) mitral regurgitation is present.   Mild mitral annular calcification is present.   Mild aortic sclerosis is present with normal valvular opening.   Normal appearing tricuspid valve structure and function.  Mild pulmonic valvular regurgitation (1+) is present.   Normal appearing left atrium.   The left ventricle is normal in size, wall motion and contractility.   Mild concentric left ventricular hypertrophy is present.   Estimated left ventricular ejection fraction is 55-60 %.   Normal appearing right atrium.   Normal appearing right ventricle structure and function.     Signature     ----------------------------------------------------------------   Electronically signed by Anastacio Motley MD(Interpreting   physician) on 03/04/2020 08:06 AM   ----------------------------------------------------------------      < end of copied text >

## 2020-03-05 NOTE — H&P ADULT - ASSESSMENT
88F.  admitted 03/05/20.  presented to ED from [facility] after a large melanotic bowel movement.    upper GIB.  acute blood loss anemia.  -serial HH.  -DC Plavix.  -DC ASA.  -Protonix gtt.  -transfuse 2 units PRBCs.  -clear liquid diet.  NPO after midnight.  -anticipate EGD in AM.    equivocal TnI.  hx CAD-PCI.  -TnI 0.055 (03/01, 0.191)  -EKG LBBB (old).  no acute ischemic changes.  -target Hbg >=9g/dL.  -AP held due to GIB.  -BB + ACEI + statin.  -Cardiology consult.    hx pauses.  -telemetry monitoring.  -caution w/ BB.    hx HFpEF.  -compensated.  -echo noted above.  -BB.    hx asthma.  -O2 PRN.  -NATALIIA HFA.  -Singulair.    disposition.  -CICU.    communication.  -RN.  -family.

## 2020-03-06 ENCOUNTER — RESULT REVIEW (OUTPATIENT)
Age: 85
End: 2020-03-06

## 2020-03-06 LAB
APPEARANCE UR: CLEAR — SIGNIFICANT CHANGE UP
BASOPHILS # BLD AUTO: 0.02 K/UL — SIGNIFICANT CHANGE UP (ref 0–0.2)
BASOPHILS NFR BLD AUTO: 0.2 % — SIGNIFICANT CHANGE UP (ref 0–2)
BILIRUB UR-MCNC: NEGATIVE — SIGNIFICANT CHANGE UP
COLOR SPEC: YELLOW — SIGNIFICANT CHANGE UP
DIFF PNL FLD: ABNORMAL
EOSINOPHIL # BLD AUTO: 0.53 K/UL — HIGH (ref 0–0.5)
EOSINOPHIL NFR BLD AUTO: 6.5 % — HIGH (ref 0–6)
GLUCOSE UR QL: NEGATIVE MG/DL — SIGNIFICANT CHANGE UP
HCT VFR BLD CALC: 28.1 % — LOW (ref 34.5–45)
HGB BLD-MCNC: 9.4 G/DL — LOW (ref 11.5–15.5)
IMM GRANULOCYTES NFR BLD AUTO: 0.9 % — SIGNIFICANT CHANGE UP (ref 0–1.5)
KETONES UR-MCNC: NEGATIVE — SIGNIFICANT CHANGE UP
LEUKOCYTE ESTERASE UR-ACNC: NEGATIVE — SIGNIFICANT CHANGE UP
LYMPHOCYTES # BLD AUTO: 1.75 K/UL — SIGNIFICANT CHANGE UP (ref 1–3.3)
LYMPHOCYTES # BLD AUTO: 21.4 % — SIGNIFICANT CHANGE UP (ref 13–44)
MCHC RBC-ENTMCNC: 31.2 PG — SIGNIFICANT CHANGE UP (ref 27–34)
MCHC RBC-ENTMCNC: 33.5 GM/DL — SIGNIFICANT CHANGE UP (ref 32–36)
MCV RBC AUTO: 93.4 FL — SIGNIFICANT CHANGE UP (ref 80–100)
MONOCYTES # BLD AUTO: 1.07 K/UL — HIGH (ref 0–0.9)
MONOCYTES NFR BLD AUTO: 13.1 % — SIGNIFICANT CHANGE UP (ref 2–14)
NEUTROPHILS # BLD AUTO: 4.72 K/UL — SIGNIFICANT CHANGE UP (ref 1.8–7.4)
NEUTROPHILS NFR BLD AUTO: 57.9 % — SIGNIFICANT CHANGE UP (ref 43–77)
NITRITE UR-MCNC: NEGATIVE — SIGNIFICANT CHANGE UP
PH UR: 5 — SIGNIFICANT CHANGE UP (ref 5–8)
PLATELET # BLD AUTO: 125 K/UL — LOW (ref 150–400)
PROT UR-MCNC: NEGATIVE MG/DL — SIGNIFICANT CHANGE UP
RBC # BLD: 3.01 M/UL — LOW (ref 3.8–5.2)
RBC # FLD: 13.6 % — SIGNIFICANT CHANGE UP (ref 10.3–14.5)
SP GR SPEC: 1.01 — SIGNIFICANT CHANGE UP (ref 1.01–1.02)
UROBILINOGEN FLD QL: NEGATIVE MG/DL — SIGNIFICANT CHANGE UP
WBC # BLD: 8.16 K/UL — SIGNIFICANT CHANGE UP (ref 3.8–10.5)
WBC # FLD AUTO: 8.16 K/UL — SIGNIFICANT CHANGE UP (ref 3.8–10.5)

## 2020-03-06 PROCEDURE — 99232 SBSQ HOSP IP/OBS MODERATE 35: CPT

## 2020-03-06 PROCEDURE — 88305 TISSUE EXAM BY PATHOLOGIST: CPT | Mod: 26

## 2020-03-06 PROCEDURE — 93010 ELECTROCARDIOGRAM REPORT: CPT

## 2020-03-06 PROCEDURE — 88312 SPECIAL STAINS GROUP 1: CPT | Mod: 26

## 2020-03-06 RX ORDER — PANTOPRAZOLE SODIUM 20 MG/1
40 TABLET, DELAYED RELEASE ORAL
Refills: 0 | Status: DISCONTINUED | OUTPATIENT
Start: 2020-03-06 | End: 2020-03-11

## 2020-03-06 RX ADMIN — Medication 5 MILLIGRAM(S): at 22:21

## 2020-03-06 RX ADMIN — Medication 25 MICROGRAM(S): at 05:28

## 2020-03-06 RX ADMIN — Medication 100 MILLIGRAM(S): at 13:12

## 2020-03-06 RX ADMIN — PANTOPRAZOLE SODIUM 40 MILLIGRAM(S): 20 TABLET, DELAYED RELEASE ORAL at 20:29

## 2020-03-06 RX ADMIN — PANTOPRAZOLE SODIUM 40 MILLIGRAM(S): 20 TABLET, DELAYED RELEASE ORAL at 22:21

## 2020-03-06 RX ADMIN — PANTOPRAZOLE SODIUM 10 MG/HR: 20 TABLET, DELAYED RELEASE ORAL at 08:30

## 2020-03-06 RX ADMIN — AMLODIPINE BESYLATE 10 MILLIGRAM(S): 2.5 TABLET ORAL at 22:21

## 2020-03-06 RX ADMIN — SIMVASTATIN 5 MILLIGRAM(S): 20 TABLET, FILM COATED ORAL at 22:21

## 2020-03-06 RX ADMIN — Medication 10 MILLIGRAM(S): at 13:12

## 2020-03-06 NOTE — CONSULT NOTE ADULT - SUBJECTIVE AND OBJECTIVE BOX
Patient is a 88y old  Female who presents with a chief complaint of melena (05 Mar 2020 17:01)      HPI:  CC:  Patient is a 88y old  Female who presents with a chief complaint of melena (05 Mar 2020 17:01)      HPI:  patient admitted to  03/01-03/04/20 due to fall, rhabdomyolysis and elevated troponin.  additionally, treated for mastoiditis/otitis media and treated w/ Augmentin.  was found to have cardiac rhythm pauses, evaluated by EP, BB dose decreased and patient discharged to [facility] w/ MCOT patch.    while at [facility], she had a large melanotic bowel movement while being showered by staff members.  transported back to  for further evaluation.    in ED, Hbg 7.5g/dL.  ED physician ordered 2 units of PRBCs and Protonix gtt was started.  GI evaluated in ED and plans to perform EGD in the AM.    ROS:      all other review of systems are negative unless indicated above.    PAST MEDICAL & SURGICAL HISTORY:  GERD (gastroesophageal reflux disease)  Hyperlipidemia  HTN (hypertension)  Asthma  CAD (coronary artery disease)  No significant past surgical history      Allergies    No Known Allergies    Intolerances        Home Medications:  amLODIPine 10 mg oral tablet: 1 tab(s) orally once a day (at bedtime) (05 Mar 2020 15:41)  aspirin 325 mg oral delayed release tablet: 1 tab(s) orally once a day (05 Mar 2020 15:41)  calcium-vitamin D 500 mg-200 intl units (5 mcg) oral tablet: 1 tab(s) orally once a day (05 Mar 2020 15:41)  clopidogrel 75 mg oral tablet: 1 tab(s) orally once a day (05 Mar 2020 15:41)  enalapril 10 mg oral tablet: 1 tab(s) orally once a day (05 Mar 2020 15:41)  levothyroxine 25 mcg (0.025 mg) oral tablet: 1 tab(s) orally once a day (05 Mar 2020 15:41)  Metoprolol Succinate  mg oral tablet, extended release: 1 tab(s) orally once a day (05 Mar 2020 15:41)  simvastatin 5 mg oral tablet: 1 tab(s) orally once a day (at bedtime) (05 Mar 2020 15:41)  Singulair 10 mg oral tablet: 1 tab(s) orally once a day (05 Mar 2020 15:41)      Social History:  prior to fall, patient lived alone and independently in the community.  no tobacco or EtOH.    FAMILY HISTORY:  Family history of heart attack      Vital Signs Last 24 Hrs  T(C): 36.6 (05 Mar 2020 17:15), Max: 36.7 (05 Mar 2020 16:55)  T(F): 97.8 (05 Mar 2020 17:15), Max: 98 (05 Mar 2020 16:55)  HR: 86 (05 Mar 2020 17:15) (72 - 86)  BP: 141/61 (05 Mar 2020 17:15) (116/44 - 141/61)  BP(mean): 70 (05 Mar 2020 15:33) (70 - 70)  RR: 15 (05 Mar 2020 17:15) (15 - 22)  SpO2: 100% (05 Mar 2020 17:15) (100% - 100%)    Constitutional: ill appearing wf.  HEENT: PERRL, EOMI, MMM.  Neck: Soft and supple, No carotid bruit, No JVD  Respiratory: Breath sounds are clear bilaterally, No wheezing, rales or rhonchi  Cardiovascular: S1 and S2, regular rate and rhythm, no murmur, rub or gallop.  Gastrointestinal: Bowel Sounds present, soft, nontender, nondistended, no guarding, no rebound, no mass.  Extremities: No peripheral edema  Vascular: 2+ peripheral pulses  Neurological: A/O x 3, no focal deficits  Musculoskeletal: 5/5 strength b/l upper and lower extremities  Skin:  (+) pallor.                             7.5    8.81  )-----------( 157      ( 05 Mar 2020 14:56 )             23.0       PT/INR - ( 05 Mar 2020 14:56 )   PT: 13.0 sec;   INR: 1.17 ratio         PTT - ( 05 Mar 2020 14:56 )  PTT:27.1 sec    03-05    144  |  114<H>  |  44<H>  ----------------------------<  120<H>  4.4   |  25  |  0.73    Ca    7.5<L>      05 Mar 2020 14:56  Mg     2.6     03-04    TPro  4.5<L>  /  Alb  2.0<L>  /  TBili  0.2  /  DBili  x   /  AST  23  /  ALT  30  /  AlkPhos  35<L>  03-05      LIVER FUNCTIONS - ( 05 Mar 2020 14:56 )  Alb: 2.0 g/dL / Pro: 4.5 gm/dL / ALK PHOS: 35 U/L / ALT: 30 U/L / AST: 23 U/L / GGT: x             CARDIAC MARKERS ( 05 Mar 2020 14:56 )  0.055 ng/mL / x     / x     / x     / x        < from: Xray Chest 1 View- PORTABLE-Urgent (03.05.20 @ 14:49) >  IMPRESSION: Left basilar opacification representing atelectasis, effusion, or pneumonia.        < end of copied text >      < from: CT Head No Cont (03.05.20 @ 14:17) >  IMPRESSION:  Mild chronic microvascular changes without evidence of an acute transcortical infarction or hemorrhage. MR is a more sensitive imaging modality for the evaluation of an acute infarction. Left-sided otomastoiditis, unchanged.      < end of copied text >    < from: TTE Echo Complete w/o contrast w/ Doppler (03.02.20 @ 14:01) >  Impression     Summary     EA reversal of the mitral inflow consistent with reduced compliance of the   left ventricle.   Mild (1+) mitral regurgitation is present.   Mild mitral annular calcification is present.   Mild aortic sclerosis is present with normal valvular opening.   Normal appearing tricuspid valve structure and function.  Mild pulmonic valvular regurgitation (1+) is present.   Normal appearing left atrium.   The left ventricle is normal in size, wall motion and contractility.   Mild concentric left ventricular hypertrophy is present.   Estimated left ventricular ejection fraction is 55-60 %.   Normal appearing right atrium.   Normal appearing right ventricle structure and function.     Signature     ----------------------------------------------------------------   Electronically signed by Anastacio Motley MD(Interpreting   physician) on 03/04/2020 08:06 AM   ----------------------------------------------------------------      < end of copied text > (05 Mar 2020 18:10)      PAST MEDICAL & SURGICAL HISTORY:  GERD (gastroesophageal reflux disease)  Hyperlipidemia  HTN (hypertension)  Asthma  CAD (coronary artery disease)  No significant past surgical history      MEDICATIONS  (STANDING):  amLODIPine   Tablet 10 milliGRAM(s) Oral at bedtime  calcium carbonate 1250 mG  + Vitamin D (OsCal 500 + D) 1 Tablet(s) Oral daily  enalapril 10 milliGRAM(s) Oral daily  levothyroxine 25 MICROGram(s) Oral daily  melatonin 5 milliGRAM(s) Oral at bedtime  metoprolol succinate  milliGRAM(s) Oral daily  montelukast 10 milliGRAM(s) Oral daily  pantoprazole Infusion 8 mG/Hr (10 mL/Hr) IV Continuous <Continuous>  simvastatin 5 milliGRAM(s) Oral at bedtime    MEDICATIONS  (PRN):  ALBUTerol    90 MICROgram(s) HFA Inhaler 2 Puff(s) Inhalation every 6 hours PRN Shortness of Breath and/or Wheezing      FAMILY HISTORY:  Family history of heart attack      SOCIAL HISTORY:    REVIEW OF SYSTEMS:  CONSTITUTIONAL:    No fatigue, malaise, lethargy.  No fever or chills.  HEENT:  Eyes:  No visual changes.     ENT:  No epistaxis.  No sinus pain.    RESPIRATORY:  No cough.  No wheeze.  No hemoptysis.  No shortness of breath.  CARDIOVASCULAR:  No chest pains.  No palpitations. No shortness of breath, No orthopnea or PND.  GASTROINTESTINAL:  No abdominal pain.  No nausea or vomiting.    GENITOURINARY:    No hematuria.    MUSCULOSKELETAL:  No musculoskeletal pain.  No joint swelling.  No arthritis.  NEUROLOGICAL:  No tingling or numbness or weakness.  PSYCHIATRIC:  No confusion  SKIN:  No rashes.    ENDOCRINE:  No unexplained weight loss.  No polydipsia.   HEMATOLOGIC:  No anemia.  No prolonged or excessive bleeding.   ALLERGIC AND IMMUNOLOGIC:  No pruritus.          Vital Signs Last 24 Hrs  T(C): 37 (06 Mar 2020 05:00), Max: 37 (05 Mar 2020 21:39)  T(F): 98.6 (06 Mar 2020 05:00), Max: 98.6 (05 Mar 2020 21:39)  HR: 68 (06 Mar 2020 08:00) (62 - 96)  BP: 125/50 (06 Mar 2020 08:00) (106/84 - 146/65)  BP(mean): 69 (06 Mar 2020 08:00) (52 - 94)  RR: 21 (06 Mar 2020 08:00) (15 - 26)  SpO2: 95% (06 Mar 2020 08:00) (95% - 100%)    PHYSICAL EXAM-    Constitutional: obese female in no acute distress     Head: Head is normocephalic and atraumatic.      Neck:  No JVD.     Cardiovascular: Regular rate and rhythm without S3, S4. No murmurs or rubs are appreciated.      Respiratory: Breathsounds are normal. No rales. No wheezing.    Abdomen: Soft, nontender, nondistended with positive bowel sounds.      Extremity: No tenderness. No  pitting edema     Neurologic: The patient is alert and oriented.      Skin: No rash, no obvious lesions noted.      Psychiatric: The patient appears to be emotionally stable.      INTERPRETATION OF TELEMETRY: SR     ECG: Sinus rythm , l axis, no ST T changes.     I&O's Detail    05 Mar 2020 07:01  -  06 Mar 2020 07:00  --------------------------------------------------------  IN:    Packed Red Blood Cells: 578 mL    pantoprazole Infusion: 80 mL  Total IN: 658 mL    OUT:    Intermittent Catheterization - Urethral: 1100 mL  Total OUT: 1100 mL    Total NET: -442 mL          LABS:                        9.4    8.16  )-----------( 125      ( 06 Mar 2020 06:29 )             28.1     03-05    144  |  114<H>  |  44<H>  ----------------------------<  120<H>  4.4   |  25  |  0.73    Ca    7.5<L>      05 Mar 2020 14:56    TPro  4.5<L>  /  Alb  2.0<L>  /  TBili  0.2  /  DBili  x   /  AST  23  /  ALT  30  /  AlkPhos  35<L>  03-05    CARDIAC MARKERS ( 05 Mar 2020 14:56 )  0.055 ng/mL / x     / x     / x     / x          PT/INR - ( 05 Mar 2020 14:56 )   PT: 13.0 sec;   INR: 1.17 ratio         PTT - ( 05 Mar 2020 14:56 )  PTT:27.1 sec    I&O's Summary    05 Mar 2020 07:01  -  06 Mar 2020 07:00  --------------------------------------------------------  IN: 658 mL / OUT: 1100 mL / NET: -442 mL      BNP  RADIOLOGY & ADDITIONAL STUDIES:

## 2020-03-06 NOTE — CONSULT NOTE ADULT - ASSESSMENT
Gi bleed- anemia- pt denies any bleed.  Hgb 9 s/p PRBC.  Goal hgb of 9.     CAD s/p PCI - of RCA In 2006 and PCI of LCX in 2007   Reviewed her cath reports.  Will hold plavix for now .  Continue ASA  Gi evaluation recommended.     HTN- continue home meds.    Other medical issues- Management per primary team.   Thank you for allowing me to participate in the care of this patient. Please feel free to contact me with any questions.

## 2020-03-06 NOTE — PROVIDER CONTACT NOTE (OTHER) - SITUATION
Spoke with Chun at office to inform dr that patient is in [facility].  Please fax discharge papers to [phone].

## 2020-03-06 NOTE — PROGRESS NOTE ADULT - SUBJECTIVE AND OBJECTIVE BOX
at [facility], she had a large melanotic bowel movement while being showered by staff members.  transported back to  for further evaluation.    in ED, Hbg 7.5g/dL.  ED physician ordered 2 units of PRBCs and Protonix gtt was started.  GI evaluated in ED and plans to perform EGDtoday    Vital Signs Last 24 Hrs  T(C): 36.4 (06 Mar 2020 14:11), Max: 37 (05 Mar 2020 21:39)  T(F): 97.6 (06 Mar 2020 14:11), Max: 98.6 (05 Mar 2020 21:39)  HR: 72 (06 Mar 2020 13:00) (62 - 96)  BP: 96/74 (06 Mar 2020 13:00) (96/74 - 146/65)  BP(mean): 79 (06 Mar 2020 13:00) (52 - 94)  RR: 21 (06 Mar 2020 13:00) (15 - 26)  SpO2: 97% (06 Mar 2020 13:00) (95% - 100%)    heent nc at [facility]  chest cta  cvs s1s2 reg no m/g/r  abd soft nt nd + bs                          9.4    8.16  )-----------( 125      ( 06 Mar 2020 06:29 )             28.1     upper GIB.  acute blood loss anemia.  -serial .  -DC Plavix.  -DC ASA.  -Protonix gtt.      equivocal TnI.  hx CAD-PCI.  -TnI 0.055 (03/01, 0.191)  -EKG LBBB (old).  no acute ischemic changes.  - for EGD today      hx HFpEF.  -compensated.  -echo noted above.  -BB.

## 2020-03-07 LAB
APPEARANCE UR: CLEAR — SIGNIFICANT CHANGE UP
BILIRUB UR-MCNC: NEGATIVE — SIGNIFICANT CHANGE UP
COLOR SPEC: YELLOW — SIGNIFICANT CHANGE UP
DIFF PNL FLD: NEGATIVE — SIGNIFICANT CHANGE UP
GLUCOSE UR QL: NEGATIVE MG/DL — SIGNIFICANT CHANGE UP
HCT VFR BLD CALC: 28.2 % — LOW (ref 34.5–45)
HGB BLD-MCNC: 9.4 G/DL — LOW (ref 11.5–15.5)
KETONES UR-MCNC: NEGATIVE — SIGNIFICANT CHANGE UP
LEUKOCYTE ESTERASE UR-ACNC: NEGATIVE — SIGNIFICANT CHANGE UP
MCHC RBC-ENTMCNC: 30.9 PG — SIGNIFICANT CHANGE UP (ref 27–34)
MCHC RBC-ENTMCNC: 33.3 GM/DL — SIGNIFICANT CHANGE UP (ref 32–36)
MCV RBC AUTO: 92.8 FL — SIGNIFICANT CHANGE UP (ref 80–100)
NITRITE UR-MCNC: NEGATIVE — SIGNIFICANT CHANGE UP
PH UR: 5 — SIGNIFICANT CHANGE UP (ref 5–8)
PLATELET # BLD AUTO: 150 K/UL — SIGNIFICANT CHANGE UP (ref 150–400)
PROT UR-MCNC: NEGATIVE MG/DL — SIGNIFICANT CHANGE UP
RBC # BLD: 3.04 M/UL — LOW (ref 3.8–5.2)
RBC # FLD: 13.6 % — SIGNIFICANT CHANGE UP (ref 10.3–14.5)
SP GR SPEC: 1.01 — SIGNIFICANT CHANGE UP (ref 1.01–1.02)
UROBILINOGEN FLD QL: NEGATIVE MG/DL — SIGNIFICANT CHANGE UP
WBC # BLD: 7.46 K/UL — SIGNIFICANT CHANGE UP (ref 3.8–10.5)
WBC # FLD AUTO: 7.46 K/UL — SIGNIFICANT CHANGE UP (ref 3.8–10.5)

## 2020-03-07 PROCEDURE — 99232 SBSQ HOSP IP/OBS MODERATE 35: CPT

## 2020-03-07 RX ORDER — HEPARIN SODIUM 5000 [USP'U]/ML
5000 INJECTION INTRAVENOUS; SUBCUTANEOUS EVERY 12 HOURS
Refills: 0 | Status: DISCONTINUED | OUTPATIENT
Start: 2020-03-07 | End: 2020-03-11

## 2020-03-07 RX ORDER — ASPIRIN/CALCIUM CARB/MAGNESIUM 324 MG
81 TABLET ORAL DAILY
Refills: 0 | Status: DISCONTINUED | OUTPATIENT
Start: 2020-03-07 | End: 2020-03-11

## 2020-03-07 RX ORDER — TAMSULOSIN HYDROCHLORIDE 0.4 MG/1
0.4 CAPSULE ORAL AT BEDTIME
Refills: 0 | Status: DISCONTINUED | OUTPATIENT
Start: 2020-03-07 | End: 2020-03-11

## 2020-03-07 RX ORDER — ALPRAZOLAM 0.25 MG
0.25 TABLET ORAL ONCE
Refills: 0 | Status: DISCONTINUED | OUTPATIENT
Start: 2020-03-07 | End: 2020-03-07

## 2020-03-07 RX ADMIN — AMLODIPINE BESYLATE 10 MILLIGRAM(S): 2.5 TABLET ORAL at 22:54

## 2020-03-07 RX ADMIN — HEPARIN SODIUM 5000 UNIT(S): 5000 INJECTION INTRAVENOUS; SUBCUTANEOUS at 22:54

## 2020-03-07 RX ADMIN — Medication 10 MILLIGRAM(S): at 10:20

## 2020-03-07 RX ADMIN — Medication 81 MILLIGRAM(S): at 11:16

## 2020-03-07 RX ADMIN — Medication 1 TABLET(S): at 10:20

## 2020-03-07 RX ADMIN — TAMSULOSIN HYDROCHLORIDE 0.4 MILLIGRAM(S): 0.4 CAPSULE ORAL at 11:16

## 2020-03-07 RX ADMIN — MONTELUKAST 10 MILLIGRAM(S): 4 TABLET, CHEWABLE ORAL at 10:20

## 2020-03-07 RX ADMIN — Medication 5 MILLIGRAM(S): at 22:54

## 2020-03-07 RX ADMIN — Medication 100 MILLIGRAM(S): at 10:20

## 2020-03-07 RX ADMIN — PANTOPRAZOLE SODIUM 40 MILLIGRAM(S): 20 TABLET, DELAYED RELEASE ORAL at 22:54

## 2020-03-07 RX ADMIN — PANTOPRAZOLE SODIUM 40 MILLIGRAM(S): 20 TABLET, DELAYED RELEASE ORAL at 10:20

## 2020-03-07 RX ADMIN — Medication 25 MICROGRAM(S): at 06:05

## 2020-03-07 RX ADMIN — Medication 0.25 MILLIGRAM(S): at 15:33

## 2020-03-07 RX ADMIN — SIMVASTATIN 5 MILLIGRAM(S): 20 TABLET, FILM COATED ORAL at 22:54

## 2020-03-07 NOTE — PROGRESS NOTE ADULT - SUBJECTIVE AND OBJECTIVE BOX
Patient is a 88y old  Female who presents with a chief complaint of GI bleed (06 Mar 2020 09:34)      HPI:  CC:  Patient is a 88y old  Female who presents with a chief complaint of melena (05 Mar 2020 17:01)      HPI:  patient admitted to  03/01-03/04/20 due to fall, rhabdomyolysis and elevated troponin.  additionally, treated for mastoiditis/otitis media and treated w/ Augmentin.  was found to have cardiac rhythm pauses, evaluated by EP, BB dose decreased and patient discharged to [facility] w/ MCOT patch.    while at [facility], she had a large melanotic bowel movement while being showered by staff members.  transported back to  for further evaluation.    in ED, Hbg 7.5g/dL.  ED physician ordered 2 units of PRBCs and Protonix gtt was started.  GI evaluated in ED and plans to perform EGD in the AM.            pt comf and vicki diet and neg CP or sob  neg abd pain  neg cough      PAST MEDICAL & SURGICAL HISTORY:  GERD (gastroesophageal reflux disease)  Hyperlipidemia  HTN (hypertension)  Asthma  CAD (coronary artery disease)  No significant past surgical history      Allergies    No Known Allergies    Intolerances        Home Medications:  amLODIPine 10 mg oral tablet: 1 tab(s) orally once a day (at bedtime) (05 Mar 2020 15:41)  aspirin 325 mg oral delayed release tablet: 1 tab(s) orally once a day (05 Mar 2020 15:41)  calcium-vitamin D 500 mg-200 intl units (5 mcg) oral tablet: 1 tab(s) orally once a day (05 Mar 2020 15:41)  clopidogrel 75 mg oral tablet: 1 tab(s) orally once a day (05 Mar 2020 15:41)  enalapril 10 mg oral tablet: 1 tab(s) orally once a day (05 Mar 2020 15:41)  levothyroxine 25 mcg (0.025 mg) oral tablet: 1 tab(s) orally once a day (05 Mar 2020 15:41)  Metoprolol Succinate  mg oral tablet, extended release: 1 tab(s) orally once a day (05 Mar 2020 15:41)  simvastatin 5 mg oral tablet: 1 tab(s) orally once a day (at bedtime) (05 Mar 2020 15:41)  Singulair 10 mg oral tablet: 1 tab(s) orally once a day (05 Mar 2020 15:41)      Social History:  prior to fall, patient lived alone and independently in the community.  no tobacco or EtOH.    FAMILY HISTORY:  Family history of heart attack                                7.5    8.81  )-----------( 157      ( 05 Mar 2020 14:56 )             23.0       PT/INR - ( 05 Mar 2020 14:56 )   PT: 13.0 sec;   INR: 1.17 ratio         PTT - ( 05 Mar 2020 14:56 )  PTT:27.1 sec    03-05    144  |  114<H>  |  44<H>  ----------------------------<  120<H>  4.4   |  25  |  0.73    Ca    7.5<L>      05 Mar 2020 14:56  Mg     2.6     03-04    TPro  4.5<L>  /  Alb  2.0<L>  /  TBili  0.2  /  DBili  x   /  AST  23  /  ALT  30  /  AlkPhos  35<L>  03-05      LIVER FUNCTIONS - ( 05 Mar 2020 14:56 )  Alb: 2.0 g/dL / Pro: 4.5 gm/dL / ALK PHOS: 35 U/L / ALT: 30 U/L / AST: 23 U/L / GGT: x             CARDIAC MARKERS ( 05 Mar 2020 14:56 )  0.055 ng/mL / x     / x     / x     / x        < from: Xray Chest 1 View- PORTABLE-Urgent (03.05.20 @ 14:49) >  IMPRESSION: Left basilar opacification representing atelectasis, effusion, or pneumonia.        < end of copied text >      < from: CT Head No Cont (03.05.20 @ 14:17) >  IMPRESSION:  Mild chronic microvascular changes without evidence of an acute transcortical infarction or hemorrhage. MR is a more sensitive imaging modality for the evaluation of an acute infarction. Left-sided otomastoiditis, unchanged.      < end of copied text >    < from: TTE Echo Complete w/o contrast w/ Doppler (03.02.20 @ 14:01) >  Impression     Summary     EA reversal of the mitral inflow consistent with reduced compliance of the   left ventricle.   Mild (1+) mitral regurgitation is present.   Mild mitral annular calcification is present.   Mild aortic sclerosis is present with normal valvular opening.   Normal appearing tricuspid valve structure and function.  Mild pulmonic valvular regurgitation (1+) is present.   Normal appearing left atrium.   The left ventricle is normal in size, wall motion and contractility.   Mild concentric left ventricular hypertrophy is present.   Estimated left ventricular ejection fraction is 55-60 %.   Normal appearing right atrium.   Normal appearing right ventricle structure and function.     Signature     ----------------------------------------------------------------   Electronically signed by Anastacio Motley MD(Interpreting   physician) on 03/04/2020 08:06 AM   ----------------------------------------------------------------      < end of copied text > (05 Mar 2020 18:10)      PAST MEDICAL & SURGICAL HISTORY:  GERD (gastroesophageal reflux disease)  Hyperlipidemia  HTN (hypertension)  Asthma  CAD (coronary artery disease)  No significant past surgical history      MEDICATIONS  (STANDING):  ALPRAZolam 0.25 milliGRAM(s) Oral once  amLODIPine   Tablet 10 milliGRAM(s) Oral at bedtime  aspirin  chewable 81 milliGRAM(s) Oral daily  calcium carbonate 1250 mG  + Vitamin D (OsCal 500 + D) 1 Tablet(s) Oral daily  enalapril 10 milliGRAM(s) Oral daily  levothyroxine 25 MICROGram(s) Oral daily  melatonin 5 milliGRAM(s) Oral at bedtime  metoprolol succinate  milliGRAM(s) Oral daily  montelukast 10 milliGRAM(s) Oral daily  pantoprazole    Tablet 40 milliGRAM(s) Oral two times a day  simvastatin 5 milliGRAM(s) Oral at bedtime  tamsulosin 0.4 milliGRAM(s) Oral at bedtime    MEDICATIONS  (PRN):  ALBUTerol    90 MICROgram(s) HFA Inhaler 2 Puff(s) Inhalation every 6 hours PRN Shortness of Breath and/or Wheezing      Allergies    No Known Allergies    Intolerances        SOCIAL HISTORY:NC    FAMILY HISTORY:  Family history of heart attack      REVIEW OF SYSTEMS:    CONSTITUTIONAL: No weakness, fevers or chills  EYES/ENT: No visual changes;  No vertigo or throat pain   NECK: No pain or stiffness  RESPIRATORY: No cough, wheezing, hemoptysis; No shortness of breath  CARDIOVASCULAR: No chest pain or palpitations  GENITOURINARY: No dysuria, frequency or hematuria  NEUROLOGICAL: No numbness or weakness  SKIN: No itching, burning, rashes, or lesions   All other review of systems is negative unless indicated above.    Vital Signs Last 24 Hrs  T(C): 37.2 (07 Mar 2020 08:10), Max: 37.2 (07 Mar 2020 08:10)  T(F): 99 (07 Mar 2020 08:10), Max: 99 (07 Mar 2020 08:10)  HR: 107 (07 Mar 2020 08:10) (58 - 107)  BP: 118/69 (07 Mar 2020 08:10) (112/46 - 158/54)  BP(mean): 69 (06 Mar 2020 20:00) (65 - 82)  RR: 20 (07 Mar 2020 08:10) (19 - 24)  SpO2: 97% (07 Mar 2020 08:10) (96% - 100%)    PHYSICAL EXAM:    Constitutional: NAD, well-developed  HEENT: EOMI, throat clear  Neck: No LAD, supple  Respiratory: CTA and P  Cardiovascular: S1 and S2, RRR, no M  Gastrointestinal: BS+, soft, mild upper abd tend/ND, neg HSM,  Extremities: No peripheral edema, neg clubing, cyanosis  Vascular: 2+ peripheral pulses  Neurological: A/O x 3, no focal deficits  Psychiatric: Normal mood, normal affect  Skin: No rashes    LABS:  CBC Full  -  ( 07 Mar 2020 08:21 )  WBC Count : 7.46 K/uL  RBC Count : 3.04 M/uL  Hemoglobin : 9.4 g/dL  Hematocrit : 28.2 %  Platelet Count - Automated : 150 K/uL  Mean Cell Volume : 92.8 fl  Mean Cell Hemoglobin : 30.9 pg  Mean Cell Hemoglobin Concentration : 33.3 gm/dL  Auto Neutrophil # : x  Auto Lymphocyte # : x  Auto Monocyte # : x  Auto Eosinophil # : x  Auto Basophil # : x  Auto Neutrophil % : x  Auto Lymphocyte % : x  Auto Monocyte % : x  Auto Eosinophil % : x  Auto Basophil % : x                  RADIOLOGY & ADDITIONAL STUDIES:

## 2020-03-07 NOTE — PROGRESS NOTE ADULT - SUBJECTIVE AND OBJECTIVE BOX
HOSPITALIST PROGRESS NOTE:  SUBJECTIVE:  PCP:  Chief Complaint: Patient is a 88y old  Female who presents with a chief complaint of GI bleed (06 Mar 2020 09:34)      HPI:  CC:  Patient is a 88y old  Female who presents with a chief complaint of melena (05 Mar 2020 17:01)      HPI:  patient admitted to  -20 due to fall, rhabdomyolysis and elevated troponin.  additionally, treated for mastoiditis/otitis media and treated w/ Augmentin.  was found to have cardiac rhythm pauses, evaluated by EP, BB dose decreased and patient discharged to [facility] w/ MCOT patch.  while at [facility], she had a large melanotic bowel movement while being showered by staff members.  transported back to  for further evaluation.  in ED, Hbg 7.5g/dL.  ED physician ordered 2 units of PRBCs and Protonix gtt was started.  GI evaluated in ED and plans to perform EGD in the AM.    3/7: Above reviewed;     Allergies:  No Known Allergies    REVIEW OF SYSTEMS:  See HPI. All other review of systems is negative unless indicated above.     OBJECTIVE  Physical Exam:  Vital Signs:    Vital Signs Last 24 Hrs  T(C): 37.2 (07 Mar 2020 08:10), Max: 37.2 (07 Mar 2020 08:10)  T(F): 99 (07 Mar 2020 08:10), Max: 99 (07 Mar 2020 08:10)  HR: 107 (07 Mar 2020 08:10) (57 - 107)  BP: 118/69 (07 Mar 2020 08:10) (96/74 - 158/54)  BP(mean): 69 (06 Mar 2020 20:00) (62 - 82)  RR: 20 (07 Mar 2020 08:10) (19 - 24)  SpO2: 97% (07 Mar 2020 08:10) (96% - 100%)  I&O's Summary    06 Mar 2020 07:01  -  07 Mar 2020 07:00  --------------------------------------------------------  IN: 0 mL / OUT: 350 mL / NET: -350 mL        Constitutional: NAD, awake and alert, well-developed  Neurological: AAO x 3, no focal deficits  HEENT: PERRLA, EOMI, MMM  Neck: Soft and supple, No LAD, No JVD  Respiratory: Breath sounds are clear bilaterally, No wheezing, rales or rhonchi  Cardiovascular: S1 and S2, regular rate and rhythm; no Murmurs, gallops or rubs  Gastrointestinal: Bowel Sounds present, soft, nontender, nondistended, no guarding, no rebound tenderness  Back: No CVA tenderness   Extremities: No peripheral edema  Vascular: 2+ peripheral pulses  Musculoskeletal: 5/5 strength b/l upper and lower extremities  Skin: No rashes  Breast: Deferred  Rectal: Deferred    MEDICATIONS  (STANDING):  amLODIPine   Tablet 10 milliGRAM(s) Oral at bedtime  calcium carbonate 1250 mG  + Vitamin D (OsCal 500 + D) 1 Tablet(s) Oral daily  enalapril 10 milliGRAM(s) Oral daily  levothyroxine 25 MICROGram(s) Oral daily  melatonin 5 milliGRAM(s) Oral at bedtime  metoprolol succinate  milliGRAM(s) Oral daily  montelukast 10 milliGRAM(s) Oral daily  pantoprazole    Tablet 40 milliGRAM(s) Oral two times a day  simvastatin 5 milliGRAM(s) Oral at bedtime      LABS: All Labs Reviewed:                        9.4    8.16  )-----------( 125      ( 06 Mar 2020 06:29 )             28.1     03-05    144  |  114<H>  |  44<H>  ----------------------------<  120<H>  4.4   |  25  |  0.73    Ca    7.5<L>      05 Mar 2020 14:56    TPro  4.5<L>  /  Alb  2.0<L>  /  TBili  0.2  /  DBili  x   /  AST  23  /  ALT  30  /  AlkPhos  35<L>  03-05    PT/INR - ( 05 Mar 2020 14:56 )   PT: 13.0 sec;   INR: 1.17 ratio         PTT - ( 05 Mar 2020 14:56 )  PTT:27.1 sec  CARDIAC MARKERS ( 05 Mar 2020 14:56 )  0.055 ng/mL / x     / x     / x     / x            Urinalysis Basic - ( 05 Mar 2020 10:40 )    Color: Yellow / Appearance: Clear / S.015 / pH: x  Gluc: x / Ketone: Negative  / Bili: Negative / Urobili: Negative mg/dL   Blood: x / Protein: Negative mg/dL / Nitrite: Negative   Leuk Esterase: Negative / RBC: 3-5 /HPF / WBC 6-10   Sq Epi: x / Non Sq Epi: Few / Bacteria: Many    RADIOLOGY/EKG:    < from: Xray Chest 1 View- PORTABLE-Urgent (20 @ 14:49) >    IMPRESSION: Left basilar opacification representing atelectasis, effusion, or pneumonia.    < end of copied text >    < from: CT Head No Cont (20 @ 14:17) >    IMPRESSION:  Mild chronic microvascular changes without evidence of an acute transcortical infarction or hemorrhage. MR is a more sensitive imaging modality for the evaluation of an acute infarction. Left-sided otomastoiditis, unchanged.        < end of copied text >    < from: CT Head No Cont (20 @ 16:44) >    IMPRESSION:  LEFT mastoiditis and otitis media.  No acute intracranial findings.  Moderate atrophy and chronic white matter microvascular ischemic changes as described.   If acute stroke is of clinical concern, MRI with diffusion-weighted images would be helpful for further characterization.      < end of copied text >    < from: CT Cervical Spine No Cont (20 @ 16:44) >    IMPRESSION:     No CT evidence of acute traumatic injury to the cervical spine.    MRI may be obtained, if further information regarding ligamentous injury, hematoma or spinal cord pathology is required.     < end of copied text >    < from: TTE Echo Complete w/o contrast w/ Doppler (20 @ 14:01) >     Impression     Summary     EA reversal of the mitral inflow consistent with reduced compliance of the   left ventricle.   Mild (1+) mitral regurgitation is present.   Mild mitral annular calcification is present.   Mild aortic sclerosis is present with normal valvular opening.   Normal appearing tricuspid valve structure and function.  Mild pulmonic valvular regurgitation (1+) is present.   Normal appearing left atrium.   The left ventricle is normal in size, wall motion and contractility.   Mild concentric left ventricular hypertrophy is present.   Estimated left ventricular ejection fraction is 55-60 %.   Normal appearing right atrium.   Normal appearing right ventricle structure and function.     Signature      < end of copied text >    < from: Upper Endoscopy (20 @ 15:38) >    IMPRESS Impression:          - Normal esophagus.    IMPRESS                      - Gastric ulcers    IMPRESS                      - Duodenal ulcers.    < end of copied text > HOSPITALIST PROGRESS NOTE:  SUBJECTIVE:  PCP:  Chief Complaint: Patient is a 88y old  Female who presents with a chief complaint of GI bleed (06 Mar 2020 09:34)      HPI:  CC:  Patient is a 88y old  Female who presents with a chief complaint of melena (05 Mar 2020 17:01)      HPI:  patient admitted to  -20 due to fall, rhabdomyolysis and elevated troponin.  additionally, treated for mastoiditis/otitis media and treated w/ Augmentin.  was found to have cardiac rhythm pauses, evaluated by EP, BB dose decreased and patient discharged to [facility] w/ MCOT patch.  while at [facility], she had a large melanotic bowel movement while being showered by staff members.  transported back to  for further evaluation.  in ED, Hbg 7.5g/dL.  ED physician ordered 2 units of PRBCs and Protonix gtt was started.  GI evaluated in ED and plans to perform EGD in the AM.    3/7: Above reviewed; no further bleeding; overnight patient has been retaining urine S/P straight cath x 3; patient denies any abd pain; daughter at bedside     Allergies:  No Known Allergies    REVIEW OF SYSTEMS:  See HPI. All other review of systems is negative unless indicated above.     OBJECTIVE  Physical Exam:  Vital Signs Last 24 Hrs  T(C): 37.2 (07 Mar 2020 08:10), Max: 37.2 (07 Mar 2020 08:10)  T(F): 99 (07 Mar 2020 08:10), Max: 99 (07 Mar 2020 08:10)  HR: 107 (07 Mar 2020 08:10) (57 - 107)  BP: 118/69 (07 Mar 2020 08:10) (112/46 - 158/54)  BP(mean): 69 (06 Mar 2020 20:00) (65 - 82)  RR: 20 (07 Mar 2020 08:10) (19 - 24)  SpO2: 97% (07 Mar 2020 08:10) (96% - 100%)    Constitutional: NAD, awake and alert, obese   Neurological: no focal deficits  HEENT: PERRLA, EOMI, MMM  Neck: Soft and supple, No LAD, No JVD  Respiratory: Breath sounds are clear bilaterally, No wheezing, rales or rhonchi  Cardiovascular: S1 and S2, regular rate and rhythm; no Murmurs, gallops or rubs  Gastrointestinal: Bowel Sounds present, soft, nontender, nondistended, no guarding, no rebound tenderness  Back: No CVA tenderness   Extremities: No peripheral edema  Vascular: 2+ peripheral pulses  Musculoskeletal: 5/5 strength b/l upper and lower extremities  Skin: No rashes  Breast: Deferred  Rectal: Deferred    MEDICATIONS  (STANDING):  amLODIPine   Tablet 10 milliGRAM(s) Oral at bedtime  calcium carbonate 1250 mG  + Vitamin D (OsCal 500 + D) 1 Tablet(s) Oral daily  enalapril 10 milliGRAM(s) Oral daily  levothyroxine 25 MICROGram(s) Oral daily  melatonin 5 milliGRAM(s) Oral at bedtime  metoprolol succinate  milliGRAM(s) Oral daily  montelukast 10 milliGRAM(s) Oral daily  pantoprazole    Tablet 40 milliGRAM(s) Oral two times a day  simvastatin 5 milliGRAM(s) Oral at bedtime    Lab Results:  CBC  CBC Full  -  ( 07 Mar 2020 08:21 )  WBC Count : 7.46 K/uL  RBC Count : 3.04 M/uL  Hemoglobin : 9.4 g/dL  Hematocrit : 28.2 %  Platelet Count - Automated : 150 K/uL  Mean Cell Volume : 92.8 fl  Mean Cell Hemoglobin : 30.9 pg  Mean Cell Hemoglobin Concentration : 33.3 gm/dL  Auto Neutrophil # : x  Auto Lymphocyte # : x  Auto Monocyte # : x  Auto Eosinophil # : x  Auto Basophil # : x  Auto Neutrophil % : x  Auto Lymphocyte % : x  Auto Monocyte % : x  Auto Eosinophil % : x  Auto Basophil % : x    .		Differential:	[] Automated		[] Manual  Chemistry                        9.4    7.46  )-----------( 150      ( 07 Mar 2020 08:21 )             28.2     03-05    144  |  114<H>  |  44<H>  ----------------------------<  120<H>  4.4   |  25  |  0.73    Ca    7.5<L>      05 Mar 2020 14:56    TPro  4.5<L>  /  Alb  2.0<L>  /  TBili  0.2  /  DBili  x   /  AST  23  /  ALT  30  /  AlkPhos  35<L>  03-05    LIVER FUNCTIONS - ( 05 Mar 2020 14:56 )  Alb: 2.0 g/dL / Pro: 4.5 gm/dL / ALK PHOS: 35 U/L / ALT: 30 U/L / AST: 23 U/L / GGT: x           PT/INR - ( 05 Mar 2020 14:56 )   PT: 13.0 sec;   INR: 1.17 ratio         PTT - ( 05 Mar 2020 14:56 )  PTT:27.1 sec  Urinalysis Basic - ( 07 Mar 2020 11:45 )    Color: Yellow / Appearance: Clear / S.010 / pH: x  Gluc: x / Ketone: Negative  / Bili: Negative / Urobili: Negative mg/dL   Blood: x / Protein: Negative mg/dL / Nitrite: Negative   Leuk Esterase: Negative / RBC: x / WBC x   Sq Epi: x / Non Sq Epi: x / Bacteria: x    MICROBIOLOGY/CULTURES:  Culture Results:   No growth ( @ 18:47)    Urinalysis Basic - ( 05 Mar 2020 10:40 )    Color: Yellow / Appearance: Clear / S.015 / pH: x  Gluc: x / Ketone: Negative  / Bili: Negative / Urobili: Negative mg/dL   Blood: x / Protein: Negative mg/dL / Nitrite: Negative   Leuk Esterase: Negative / RBC: 3-5 /HPF / WBC 6-10   Sq Epi: x / Non Sq Epi: Few / Bacteria: Many    RADIOLOGY/EKG:    < from: Xray Chest 1 View- PORTABLE-Urgent (20 @ 14:49) >    IMPRESSION: Left basilar opacification representing atelectasis, effusion, or pneumonia.    < end of copied text >    < from: CT Head No Cont (20 @ 14:17) >    IMPRESSION:  Mild chronic microvascular changes without evidence of an acute transcortical infarction or hemorrhage. MR is a more sensitive imaging modality for the evaluation of an acute infarction. Left-sided otomastoiditis, unchanged.        < end of copied text >    < from: CT Head No Cont (20 @ 16:44) >    IMPRESSION:  LEFT mastoiditis and otitis media.  No acute intracranial findings.  Moderate atrophy and chronic white matter microvascular ischemic changes as described.   If acute stroke is of clinical concern, MRI with diffusion-weighted images would be helpful for further characterization.      < end of copied text >    < from: CT Cervical Spine No Cont (20 @ 16:44) >    IMPRESSION:     No CT evidence of acute traumatic injury to the cervical spine.    MRI may be obtained, if further information regarding ligamentous injury, hematoma or spinal cord pathology is required.     < end of copied text >    < from: TTE Echo Complete w/o contrast w/ Doppler (20 @ 14:01) >     Impression     Summary     EA reversal of the mitral inflow consistent with reduced compliance of the   left ventricle.   Mild (1+) mitral regurgitation is present.   Mild mitral annular calcification is present.   Mild aortic sclerosis is present with normal valvular opening.   Normal appearing tricuspid valve structure and function.  Mild pulmonic valvular regurgitation (1+) is present.   Normal appearing left atrium.   The left ventricle is normal in size, wall motion and contractility.   Mild concentric left ventricular hypertrophy is present.   Estimated left ventricular ejection fraction is 55-60 %.   Normal appearing right atrium.   Normal appearing right ventricle structure and function.     Signature      < end of copied text >    < from: Upper Endoscopy (20 @ 15:38) >    IMPRESS Impression:          - Normal esophagus.    IMPRESS                      - Gastric ulcers    IMPRESS                      - Duodenal ulcers.    < end of copied text >

## 2020-03-07 NOTE — PROGRESS NOTE ADULT - ASSESSMENT
88F.  admitted 03/05/20.  presented to ED from [facility] after a large melanotic bowel movement.    upper GIB.  acute blood loss anemia.  -serial HH.  -DC Plavix.  -continue ASA.  -S/P 2 units PRBCs.  -S/P EGD gastric and Duodenal ulcers  -continue Protonix BID     equivocal TnI.    CAD s/p PCI - of RCA In 2006 and PCI of LCX in 2007   -TnI 0.055 (03/01, 0.191)  -EKG LBBB (old).  no acute ischemic changes.  -ECHO  Estimated left ventricular ejection fraction is 55-60 %.  -BB + ACEI + statin.  -Cardiology consult appreciated   -hold  plavix for now .  -Continue ASA    hx pauses.  -S/P Tele   -caution w/ BB.    hx HFpEF.  -compensated.  -echo noted above.  -BB.    hx asthma.  -O2 PRN.  -NATALIIA HFA.  -Singulair. 88F.  admitted 03/05/20.  presented to ED from [facility] after a large melanotic bowel movement.    upper GIB.  acute blood loss anemia.  -serial HH.  -DC Plavix.  -continue ASA.  -S/P 2 units PRBCs.  -S/P EGD gastric and Duodenal ulcers  -continue Protonix BID   -GI consult appreciated     Urinary Retention  -S/P Straight Cath X 3, continue to monitor with bladder scans  -start flomax    equivocal TnI.    CAD s/p PCI - of RCA In 2006 and PCI of LCX in 2007   -TnI 0.055 (03/01, 0.191)  -EKG LBBB (old).  no acute ischemic changes.  -ECHO  Estimated left ventricular ejection fraction is 55-60 %.  -BB + ACEI + statin.  -Cardiology consult appreciated   -hold  plavix for now .  -Continue ASA    hx pauses.  -S/P Tele   -caution w/ BB.    hx HFpEF.  -compensated.  -echo noted above.  -BB.    hx asthma.  -O2 PRN.  -NATALIIA HFA.  -Singulair.

## 2020-03-08 ENCOUNTER — TRANSCRIPTION ENCOUNTER (OUTPATIENT)
Age: 85
End: 2020-03-08

## 2020-03-08 LAB
CULTURE RESULTS: NO GROWTH — SIGNIFICANT CHANGE UP
HCT VFR BLD CALC: 28.6 % — LOW (ref 34.5–45)
HGB BLD-MCNC: 9.5 G/DL — LOW (ref 11.5–15.5)
MCHC RBC-ENTMCNC: 31.6 PG — SIGNIFICANT CHANGE UP (ref 27–34)
MCHC RBC-ENTMCNC: 33.2 GM/DL — SIGNIFICANT CHANGE UP (ref 32–36)
MCV RBC AUTO: 95 FL — SIGNIFICANT CHANGE UP (ref 80–100)
PLATELET # BLD AUTO: 172 K/UL — SIGNIFICANT CHANGE UP (ref 150–400)
RBC # BLD: 3.01 M/UL — LOW (ref 3.8–5.2)
RBC # FLD: 13.4 % — SIGNIFICANT CHANGE UP (ref 10.3–14.5)
SPECIMEN SOURCE: SIGNIFICANT CHANGE UP
WBC # BLD: 7.68 K/UL — SIGNIFICANT CHANGE UP (ref 3.8–10.5)
WBC # FLD AUTO: 7.68 K/UL — SIGNIFICANT CHANGE UP (ref 3.8–10.5)

## 2020-03-08 PROCEDURE — 99232 SBSQ HOSP IP/OBS MODERATE 35: CPT

## 2020-03-08 RX ORDER — ASPIRIN/CALCIUM CARB/MAGNESIUM 324 MG
1 TABLET ORAL
Qty: 0 | Refills: 0 | DISCHARGE
Start: 2020-03-08

## 2020-03-08 RX ORDER — TAMSULOSIN HYDROCHLORIDE 0.4 MG/1
1 CAPSULE ORAL
Qty: 0 | Refills: 0 | DISCHARGE
Start: 2020-03-08

## 2020-03-08 RX ORDER — ALPRAZOLAM 0.25 MG
0.25 TABLET ORAL ONCE
Refills: 0 | Status: DISCONTINUED | OUTPATIENT
Start: 2020-03-08 | End: 2020-03-08

## 2020-03-08 RX ORDER — PANTOPRAZOLE SODIUM 20 MG/1
1 TABLET, DELAYED RELEASE ORAL
Qty: 0 | Refills: 0 | DISCHARGE
Start: 2020-03-08

## 2020-03-08 RX ORDER — LANOLIN ALCOHOL/MO/W.PET/CERES
1 CREAM (GRAM) TOPICAL
Qty: 0 | Refills: 0 | DISCHARGE
Start: 2020-03-08

## 2020-03-08 RX ADMIN — PANTOPRAZOLE SODIUM 40 MILLIGRAM(S): 20 TABLET, DELAYED RELEASE ORAL at 22:30

## 2020-03-08 RX ADMIN — Medication 81 MILLIGRAM(S): at 10:49

## 2020-03-08 RX ADMIN — HEPARIN SODIUM 5000 UNIT(S): 5000 INJECTION INTRAVENOUS; SUBCUTANEOUS at 05:13

## 2020-03-08 RX ADMIN — Medication 1 TABLET(S): at 10:49

## 2020-03-08 RX ADMIN — PANTOPRAZOLE SODIUM 40 MILLIGRAM(S): 20 TABLET, DELAYED RELEASE ORAL at 10:49

## 2020-03-08 RX ADMIN — SIMVASTATIN 5 MILLIGRAM(S): 20 TABLET, FILM COATED ORAL at 22:29

## 2020-03-08 RX ADMIN — AMLODIPINE BESYLATE 10 MILLIGRAM(S): 2.5 TABLET ORAL at 22:29

## 2020-03-08 RX ADMIN — MONTELUKAST 10 MILLIGRAM(S): 4 TABLET, CHEWABLE ORAL at 10:49

## 2020-03-08 RX ADMIN — Medication 5 MILLIGRAM(S): at 22:29

## 2020-03-08 RX ADMIN — HEPARIN SODIUM 5000 UNIT(S): 5000 INJECTION INTRAVENOUS; SUBCUTANEOUS at 22:29

## 2020-03-08 RX ADMIN — Medication 25 MICROGRAM(S): at 05:13

## 2020-03-08 RX ADMIN — Medication 0.25 MILLIGRAM(S): at 16:02

## 2020-03-08 RX ADMIN — TAMSULOSIN HYDROCHLORIDE 0.4 MILLIGRAM(S): 0.4 CAPSULE ORAL at 22:29

## 2020-03-08 RX ADMIN — Medication 100 MILLIGRAM(S): at 10:49

## 2020-03-08 RX ADMIN — Medication 10 MILLIGRAM(S): at 10:49

## 2020-03-08 NOTE — DISCHARGE NOTE PROVIDER - CARE PROVIDER_API CALL
Jordan Hernandez)  Urology  [address], [address] [place] [address]  Phone: [phone]  Fax: [phone]  Follow Up Time:     Ezequiel Quintero)  Gastroenterology; Internal Medicine  [address], [address]  [place] [address]  Phone: [phone]  Fax: [phone]  Follow Up Time:     Fu with PCP,   Phone: (   )    -  Fax: (   )    -  Follow Up Time:

## 2020-03-08 NOTE — PROGRESS NOTE ADULT - ASSESSMENT
88F.  admitted 03/05/20.  presented to ED from [facility] after a large melanotic bowel movement.    upper GIB.  acute blood loss anemia.  -serial HH.  -DC Plavix.  -continue ASA.  -S/P 2 units PRBCs.  -S/P EGD gastric and Duodenal ulcers  -continue Protonix BID   -GI consult appreciated     Urinary Retention  -S/P Straight Cath X 3, continue to monitor with bladder scans  -start flomax  -continue juárez and trial of void at rehab or FU with Urology in 1 week    equivocal TnI.    CAD s/p PCI - of RCA In 2006 and PCI of LCX in 2007   -TnI 0.055 (03/01, 0.191)  -EKG LBBB (old).  no acute ischemic changes.  -ECHO  Estimated left ventricular ejection fraction is 55-60 %.  -BB + ACEI + statin.  -Cardiology consult appreciated   -hold  plavix for now .  -Continue ASA    hx pauses.  -S/P Tele   -caution w/ BB.    hx HFpEF.  -compensated.  -echo noted above.  -BB.    hx asthma.  -O2 PRN.  -NATALIIA HFA.  -Singulair.    Dispo - patient here with GI bleed; S/P EGD found to have gastric and duodenal ulcers on protonix BID; then developed Urinary retention requiring Juárez; D/C to rehab yeny with Juárez D/C med rec and paperwork done

## 2020-03-08 NOTE — DISCHARGE NOTE PROVIDER - NSDCCPCAREPLAN_GEN_ALL_CORE_FT
PRINCIPAL DISCHARGE DIAGNOSIS  Diagnosis: UGIB (upper gastrointestinal bleed)  Assessment and Plan of Treatment: Upper GIB.  acute blood loss anemia.  -DC Plavix.  -continue ASA 81  -S/P 2 units PRBCs.  -S/P EGD gastric and Duodenal ulcers  -continue Protonix BID         SECONDARY DISCHARGE DIAGNOSES  Diagnosis: Urinary retention  Assessment and Plan of Treatment: -start flomax  -continue juárez and trial of void at rehab or FU with Urology in 1 week      Diagnosis: Syncope and collapse  Assessment and Plan of Treatment:

## 2020-03-08 NOTE — DISCHARGE NOTE PROVIDER - PROVIDER TOKENS
PROVIDER:[TOKEN:[[id number]]],PROVIDER:[TOKEN:[[id number]]],FREE:[LAST:[Fu with PCP],PHONE:[(   )    -],FAX:[(   )    -]]

## 2020-03-08 NOTE — PROGRESS NOTE ADULT - SUBJECTIVE AND OBJECTIVE BOX
HOSPITALIST PROGRESS NOTE:  SUBJECTIVE:  PCP:  Chief Complaint: Patient is a 88y old  Female who presents with a chief complaint of GI bleed (06 Mar 2020 09:34)      HPI:  CC:  Patient is a 88y old  Female who presents with a chief complaint of melena (05 Mar 2020 17:01)      HPI:  patient admitted to  -20 due to fall, rhabdomyolysis and elevated troponin.  additionally, treated for mastoiditis/otitis media and treated w/ Augmentin.  was found to have cardiac rhythm pauses, evaluated by EP, BB dose decreased and patient discharged to [facility] w/ MCOT patch.  while at [facility], she had a large melanotic bowel movement while being showered by staff members.  transported back to  for further evaluation.  in ED, Hbg 7.5g/dL.  ED physician ordered 2 units of PRBCs and Protonix gtt was started.  GI evaluated in ED and plans to perform EGD in the AM.    3/7: Above reviewed; no further bleeding; overnight patient has been retaining urine S/P straight cath x 3; patient denies any abd pain; daughter at bedside   3/8:  No bleeding; Overnight had urinary retention and required juárez;     Allergies:  No Known Allergies    REVIEW OF SYSTEMS:  See HPI. All other review of systems is negative unless indicated above.     OBJECTIVE  Physical Exam:  Vital Signs Last 24 Hrs  T(C): 36.4 (08 Mar 2020 15:30), Max: 37.2 (08 Mar 2020 08:28)  T(F): 97.6 (08 Mar 2020 15:30), Max: 98.9 (08 Mar 2020 08:28)  HR: 57 (08 Mar 2020 15:30) (57 - 64)  BP: 119/48 (08 Mar 2020 15:30) (112/59 - 121/59)  BP(mean): --  RR: 17 (08 Mar 2020 15:30) (17 - 18)  SpO2: 95% (08 Mar 2020 15:30) (94% - 100%)    Constitutional: NAD, awake and alert, obese   Neurological: no focal deficits  HEENT: PERRLA, EOMI, MMM  Neck: Soft and supple, No LAD, No JVD  Respiratory: Breath sounds are clear bilaterally, No wheezing, rales or rhonchi  Cardiovascular: S1 and S2, regular rate and rhythm; no Murmurs, gallops or rubs  Gastrointestinal: Bowel Sounds present, soft, nontender, nondistended, no guarding, no rebound tenderness  Back: No CVA tenderness   Extremities: No peripheral edema  Vascular: 2+ peripheral pulses  Musculoskeletal: 5/5 strength b/l upper and lower extremities  Skin: No rashes  Breast: Deferred  Rectal: Deferred    MEDICATIONS  (STANDING):  amLODIPine   Tablet 10 milliGRAM(s) Oral at bedtime  calcium carbonate 1250 mG  + Vitamin D (OsCal 500 + D) 1 Tablet(s) Oral daily  enalapril 10 milliGRAM(s) Oral daily  levothyroxine 25 MICROGram(s) Oral daily  melatonin 5 milliGRAM(s) Oral at bedtime  metoprolol succinate  milliGRAM(s) Oral daily  montelukast 10 milliGRAM(s) Oral daily  pantoprazole    Tablet 40 milliGRAM(s) Oral two times a day  simvastatin 5 milliGRAM(s) Oral at bedtime    Lab Results:  CBC  CBC Full  -  ( 08 Mar 2020 08:50 )  WBC Count : 7.68 K/uL  RBC Count : 3.01 M/uL  Hemoglobin : 9.5 g/dL  Hematocrit : 28.6 %  Platelet Count - Automated : 172 K/uL  Mean Cell Volume : 95.0 fl  Mean Cell Hemoglobin : 31.6 pg  Mean Cell Hemoglobin Concentration : 33.2 gm/dL  Auto Neutrophil # : x  Auto Lymphocyte # : x  Auto Monocyte # : x  Auto Eosinophil # : x  Auto Basophil # : x  Auto Neutrophil % : x  Auto Lymphocyte % : x  Auto Monocyte % : x  Auto Eosinophil % : x  Auto Basophil % : x    .		Differential:	[] Automated		[] Manual  Chemistry                        9.5    7.68  )-----------( 172      ( 08 Mar 2020 08:50 )             28.6       Urinalysis Basic - ( 07 Mar 2020 11:45 )    Color: Yellow / Appearance: Clear / S.010 / pH: x  Gluc: x / Ketone: Negative  / Bili: Negative / Urobili: Negative mg/dL   Blood: x / Protein: Negative mg/dL / Nitrite: Negative   Leuk Esterase: Negative / RBC: x / WBC x   Sq Epi: x / Non Sq Epi: x / Bacteria: x    MICROBIOLOGY/CULTURES:  Culture Results:   No growth ( @ 04:10)  Culture Results:   No growth ( @ 18:47)      RADIOLOGY/EKG:    < from: Xray Chest 1 View- PORTABLE-Urgent (20 @ 14:49) >    IMPRESSION: Left basilar opacification representing atelectasis, effusion, or pneumonia.    < end of copied text >    < from: CT Head No Cont (20 @ 14:17) >    IMPRESSION:  Mild chronic microvascular changes without evidence of an acute transcortical infarction or hemorrhage. MR is a more sensitive imaging modality for the evaluation of an acute infarction. Left-sided otomastoiditis, unchanged.        < end of copied text >    < from: CT Head No Cont (20 @ 16:44) >    IMPRESSION:  LEFT mastoiditis and otitis media.  No acute intracranial findings.  Moderate atrophy and chronic white matter microvascular ischemic changes as described.   If acute stroke is of clinical concern, MRI with diffusion-weighted images would be helpful for further characterization.      < end of copied text >    < from: CT Cervical Spine No Cont (20 @ 16:44) >    IMPRESSION:     No CT evidence of acute traumatic injury to the cervical spine.    MRI may be obtained, if further information regarding ligamentous injury, hematoma or spinal cord pathology is required.     < end of copied text >    < from: TTE Echo Complete w/o contrast w/ Doppler (20 @ 14:01) >     Impression     Summary     EA reversal of the mitral inflow consistent with reduced compliance of the   left ventricle.   Mild (1+) mitral regurgitation is present.   Mild mitral annular calcification is present.   Mild aortic sclerosis is present with normal valvular opening.   Normal appearing tricuspid valve structure and function.  Mild pulmonic valvular regurgitation (1+) is present.   Normal appearing left atrium.   The left ventricle is normal in size, wall motion and contractility.   Mild concentric left ventricular hypertrophy is present.   Estimated left ventricular ejection fraction is 55-60 %.   Normal appearing right atrium.   Normal appearing right ventricle structure and function.     Signature      < end of copied text >    < from: Upper Endoscopy (20 @ 15:38) >    IMPRESS Impression:          - Normal esophagus.    IMPRESS                      - Gastric ulcers    IMPRESS                      - Duodenal ulcers.    < end of copied text >

## 2020-03-08 NOTE — DISCHARGE NOTE PROVIDER - HOSPITAL COURSE
HOSPITALIST PROGRESS NOTE:    SUBJECTIVE:    PCP:    Chief Complaint: Patient is a 88y old  Female who presents with a chief complaint of GI bleed (06 Mar 2020 09:34)            HPI:    CC:    Patient is a 88y old  Female who presents with a chief complaint of melena (05 Mar 2020 17:01)            HPI:    patient admitted to  03/01-03/04/20 due to fall, rhabdomyolysis and elevated troponin.  additionally, treated for mastoiditis/otitis media and treated w/ Augmentin.  was found to have cardiac rhythm pauses, evaluated by EP, BB dose decreased and patient discharged to [facility] w/ MCOT patch.    while at [facility], she had a large melanotic bowel movement while being showered by staff members.  transported back to  for further evaluation.    in ED, Hbg 7.5g/dL.  ED physician ordered 2 units of PRBCs and Protonix gtt was started.  GI evaluated in ED and plans to perform EGD in the AM.        3/7: Above reviewed; no further bleeding; overnight patient has been retaining urine S/P straight cath x 3; patient denies any abd pain; daughter at bedside     3/8:  No bleeding; Overnight had urinary retention and required juárez;         88F.  admitted 03/05/20.  presented to ED from [facility] after a large melanotic bowel movement.        upper GIB.  acute blood loss anemia.    -serial HH.    -DC Plavix.    -continue ASA.    -S/P 2 units PRBCs.    -S/P EGD gastric and Duodenal ulcers    -continue Protonix BID     -GI consult appreciated         Urinary Retention    -S/P Straight Cath X 3, continue to monitor with bladder scans    -start flomax    -continue juárez and trial of void at rehab or FU with Urology in 1 week        equivocal TnI.      CAD s/p PCI - of RCA In 2006 and PCI of LCX in 2007     -TnI 0.055 (03/01, 0.191)    -EKG LBBB (old).  no acute ischemic changes.    -ECHO  Estimated left ventricular ejection fraction is 55-60 %.    -BB + ACEI + statin.    -Cardiology consult appreciated     -hold  plavix for now .    -Continue ASA        hx pauses.    -S/P Tele     -caution w/ BB.        hx HFpEF.    -compensated.    -echo noted above.    -BB.        hx asthma.    -O2 PRN.    -NATALIIA HFA.    -Singulair.        Dispo - patient here with GI bleed; S/P EGD found to have gastric and duodenal ulcers on protonix BID; then developed Urinary retention requiring Juárez; D/C to rehab yeny with Juárez D/C med rec and paperwork done         total time  65 minutes

## 2020-03-09 PROBLEM — Z00.00 ENCOUNTER FOR PREVENTIVE HEALTH EXAMINATION: Status: ACTIVE | Noted: 2020-03-09

## 2020-03-09 LAB
ANION GAP SERPL CALC-SCNC: 4 MMOL/L — LOW (ref 5–17)
BUN SERPL-MCNC: 14 MG/DL — SIGNIFICANT CHANGE UP (ref 7–23)
CALCIUM SERPL-MCNC: 8.6 MG/DL — SIGNIFICANT CHANGE UP (ref 8.5–10.1)
CHLORIDE SERPL-SCNC: 108 MMOL/L — SIGNIFICANT CHANGE UP (ref 96–108)
CO2 SERPL-SCNC: 30 MMOL/L — SIGNIFICANT CHANGE UP (ref 22–31)
CREAT SERPL-MCNC: 0.88 MG/DL — SIGNIFICANT CHANGE UP (ref 0.5–1.3)
GLUCOSE SERPL-MCNC: 130 MG/DL — HIGH (ref 70–99)
HCT VFR BLD CALC: 28.2 % — LOW (ref 34.5–45)
HGB BLD-MCNC: 9.4 G/DL — LOW (ref 11.5–15.5)
MAGNESIUM SERPL-MCNC: 2.3 MG/DL — SIGNIFICANT CHANGE UP (ref 1.6–2.6)
MCHC RBC-ENTMCNC: 32.1 PG — SIGNIFICANT CHANGE UP (ref 27–34)
MCHC RBC-ENTMCNC: 33.3 GM/DL — SIGNIFICANT CHANGE UP (ref 32–36)
MCV RBC AUTO: 96.2 FL — SIGNIFICANT CHANGE UP (ref 80–100)
PHOSPHATE SERPL-MCNC: 3.3 MG/DL — SIGNIFICANT CHANGE UP (ref 2.5–4.5)
PLATELET # BLD AUTO: 191 K/UL — SIGNIFICANT CHANGE UP (ref 150–400)
POTASSIUM SERPL-MCNC: 4.3 MMOL/L — SIGNIFICANT CHANGE UP (ref 3.5–5.3)
POTASSIUM SERPL-SCNC: 4.3 MMOL/L — SIGNIFICANT CHANGE UP (ref 3.5–5.3)
RBC # BLD: 2.93 M/UL — LOW (ref 3.8–5.2)
RBC # FLD: 13.3 % — SIGNIFICANT CHANGE UP (ref 10.3–14.5)
SODIUM SERPL-SCNC: 142 MMOL/L — SIGNIFICANT CHANGE UP (ref 135–145)
WBC # BLD: 6.78 K/UL — SIGNIFICANT CHANGE UP (ref 3.8–10.5)
WBC # FLD AUTO: 6.78 K/UL — SIGNIFICANT CHANGE UP (ref 3.8–10.5)

## 2020-03-09 PROCEDURE — 99232 SBSQ HOSP IP/OBS MODERATE 35: CPT

## 2020-03-09 RX ADMIN — Medication 1 TABLET(S): at 09:08

## 2020-03-09 RX ADMIN — MONTELUKAST 10 MILLIGRAM(S): 4 TABLET, CHEWABLE ORAL at 09:09

## 2020-03-09 RX ADMIN — PANTOPRAZOLE SODIUM 40 MILLIGRAM(S): 20 TABLET, DELAYED RELEASE ORAL at 22:14

## 2020-03-09 RX ADMIN — SIMVASTATIN 5 MILLIGRAM(S): 20 TABLET, FILM COATED ORAL at 22:14

## 2020-03-09 RX ADMIN — PANTOPRAZOLE SODIUM 40 MILLIGRAM(S): 20 TABLET, DELAYED RELEASE ORAL at 09:09

## 2020-03-09 RX ADMIN — Medication 10 MILLIGRAM(S): at 09:09

## 2020-03-09 RX ADMIN — Medication 25 MICROGRAM(S): at 06:42

## 2020-03-09 RX ADMIN — AMLODIPINE BESYLATE 10 MILLIGRAM(S): 2.5 TABLET ORAL at 22:14

## 2020-03-09 RX ADMIN — Medication 100 MILLIGRAM(S): at 09:09

## 2020-03-09 RX ADMIN — HEPARIN SODIUM 5000 UNIT(S): 5000 INJECTION INTRAVENOUS; SUBCUTANEOUS at 22:14

## 2020-03-09 RX ADMIN — Medication 5 MILLIGRAM(S): at 22:14

## 2020-03-09 RX ADMIN — HEPARIN SODIUM 5000 UNIT(S): 5000 INJECTION INTRAVENOUS; SUBCUTANEOUS at 09:09

## 2020-03-09 RX ADMIN — Medication 81 MILLIGRAM(S): at 09:09

## 2020-03-09 RX ADMIN — TAMSULOSIN HYDROCHLORIDE 0.4 MILLIGRAM(S): 0.4 CAPSULE ORAL at 22:14

## 2020-03-09 NOTE — PROGRESS NOTE ADULT - SUBJECTIVE AND OBJECTIVE BOX
CHIEF COMPLAINT/Diagnosis: gi bleed/ anemia/ duodenal ulcers    SUBJECTIVE: no complaints    REVIEW OF SYSTEMS:    CONSTITUTIONAL: No weakness, fevers or chills  EYES/ENT: No visual changes;  No vertigo or throat pain   NECK: No pain or stiffness  RESPIRATORY: No cough, wheezing, hemoptysis; No shortness of breath  CARDIOVASCULAR: No chest pain or palpitations  GASTROINTESTINAL: No abdominal or epigastric pain. No nausea, vomiting, or hematemesis; No diarrhea or constipation. No melena or hematochezia.  GENITOURINARY: No dysuria, frequency or hematuria  NEUROLOGICAL: No numbness or weakness  SKIN: No itching, burning, rashes, or lesions   All other review of systems is negative unless indicated above    Vital Signs Last 24 Hrs  T(C): 36.4 (09 Mar 2020 15:49), Max: 36.7 (09 Mar 2020 08:29)  T(F): 97.5 (09 Mar 2020 15:49), Max: 98 (09 Mar 2020 08:29)  HR: 57 (09 Mar 2020 15:49) (57 - 62)  BP: 118/50 (09 Mar 2020 15:49) (118/50 - 123/40)  BP(mean): --  RR: 18 (09 Mar 2020 15:49) (18 - 20)  SpO2: 96% (09 Mar 2020 15:49) (92% - 96%)    I&O's Summary    08 Mar 2020 07:01  -  09 Mar 2020 07:00  --------------------------------------------------------  IN: 0 mL / OUT: 1150 mL / NET: -1150 mL    09 Mar 2020 07:01  -  09 Mar 2020 16:06  --------------------------------------------------------  IN: 0 mL / OUT: 200 mL / NET: -200 mL        CAPILLARY BLOOD GLUCOSE          PHYSICAL EXAM:    Constitutional: NAD, awake and alert, well-developed  HEENT: PERR, EOMI, Normal Hearing, MMM  Neck: Soft and supple, No LAD, No JVD  Respiratory: Breath sounds are clear bilaterally, No wheezing, rales or rhonchi  Cardiovascular: S1 and S2, regular rate and rhythm, no Murmurs, gallops or rubs  Gastrointestinal: Bowel Sounds present, soft, nontender, nondistended, no guarding, no rebound  Extremities: No peripheral edema  Vascular: 2+ peripheral pulses  Neurological: A/O x 3, no focal deficits  Musculoskeletal: 5/5 strength b/l upper and lower extremities  Skin: No rashes    MEDICATIONS:  MEDICATIONS  (STANDING):  amLODIPine   Tablet 10 milliGRAM(s) Oral at bedtime  aspirin  chewable 81 milliGRAM(s) Oral daily  calcium carbonate 1250 mG  + Vitamin D (OsCal 500 + D) 1 Tablet(s) Oral daily  enalapril 10 milliGRAM(s) Oral daily  heparin  Injectable 5000 Unit(s) SubCutaneous every 12 hours  levothyroxine 25 MICROGram(s) Oral daily  melatonin 5 milliGRAM(s) Oral at bedtime  metoprolol succinate  milliGRAM(s) Oral daily  montelukast 10 milliGRAM(s) Oral daily  pantoprazole    Tablet 40 milliGRAM(s) Oral two times a day  simvastatin 5 milliGRAM(s) Oral at bedtime  tamsulosin 0.4 milliGRAM(s) Oral at bedtime      LABS: All Labs Reviewed:                        9.4    6.78  )-----------( 191      ( 09 Mar 2020 10:08 )             28.2     03-09    142  |  108  |  14  ----------------------------<  130<H>  4.3   |  30  |  0.88    Ca    8.6      09 Mar 2020 10:08  Phos  3.3     03-09  Mg     2.3     03-09            Blood Culture: 03-07 @ 04:10  Organism --  Gram Stain Blood -- Gram Stain --  Specimen Source .Urine Clean Catch (Midstream)  Culture-Blood --          Assessment and Plan:   	  88F.  admitted 03/05/20.  presented to ED from [facility] after a large melanotic bowel movement.    upper GIB.  acute blood loss anemia.  -serial HH.  -DC Plavix.  -continue ASA.  -S/P 2 units PRBCs.  -S/P EGD gastric and Duodenal ulcers  -continue Protonix BID   -GI consult appreciated     Urinary Retention  -S/P Straight Cath X 3, continue to monitor with bladder scans  -start flomax  -continue juárez and trial of void at rehab or FU with Urology in 1 week    equivocal TnI.    CAD s/p PCI - of RCA In 2006 and PCI of LCX in 2007   -TnI 0.055 (03/01, 0.191)  -EKG LBBB (old).  no acute ischemic changes.  -ECHO  Estimated left ventricular ejection fraction is 55-60 %.  -BB + ACEI + statin.  -Cardiology consult appreciated   -hold  plavix for now .  -Continue ASA    hx pauses.  -S/P Tele   -caution w/ BB.    hx HFpEF.  -compensated.  -echo noted above.  -BB.    hx asthma.  -O2 PRN.  -NATALIIA HFA.  -Singulair.    Dispo - patient here with GI bleed; S/P EGD found to have gastric and duodenal ulcers on protonix BID; then developed Urinary retention requiring Juárez  -cleared for discharge to rehab.

## 2020-03-09 NOTE — PHYSICAL THERAPY INITIAL EVALUATION ADULT - ADDITIONAL COMMENTS
Pt is a household amb, as per dtr  pt refuses to use RW and refuses help with ADLs. Pt has a hx of falls.

## 2020-03-09 NOTE — PHYSICAL THERAPY INITIAL EVALUATION ADULT - PERTINENT HX OF CURRENT PROBLEM, REHAB EVAL
88y old  Female who presents with a chief complaint of melena. Patient admitted to  03/01-03/04/20 due to fall, rhabdomyolysis and elevated troponin.  additionally, treated for mastoiditis/otitis media and treated w/ Augmentin.  was found to have cardiac rhythm pauses, evaluated by EP, BB dose decreased and patient discharged to [facility] w/ MCOT patch, while at [facility], she had a large melanotic bowel movement while being showered by staff members.  transported back to  for further evaluation.

## 2020-03-10 DIAGNOSIS — M25.561 PAIN IN RIGHT KNEE: ICD-10-CM

## 2020-03-10 DIAGNOSIS — I10 ESSENTIAL (PRIMARY) HYPERTENSION: ICD-10-CM

## 2020-03-10 DIAGNOSIS — I24.8 OTHER FORMS OF ACUTE ISCHEMIC HEART DISEASE: ICD-10-CM

## 2020-03-10 DIAGNOSIS — I44.1 ATRIOVENTRICULAR BLOCK, SECOND DEGREE: ICD-10-CM
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
## 2020-03-10 DIAGNOSIS — H66.92 OTITIS MEDIA, UNSPECIFIED, LEFT EAR: ICD-10-CM

## 2020-03-10 DIAGNOSIS — Z95.5 PRESENCE OF CORONARY ANGIOPLASTY IMPLANT AND GRAFT: ICD-10-CM

## 2020-03-10 DIAGNOSIS — H70.92 UNSPECIFIED MASTOIDITIS, LEFT EAR: ICD-10-CM
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
## 2020-03-10 DIAGNOSIS — T79.6XXA TRAUMATIC ISCHEMIA OF MUSCLE, INITIAL ENCOUNTER: ICD-10-CM

## 2020-03-10 DIAGNOSIS — N17.9 ACUTE KIDNEY FAILURE, UNSPECIFIED: ICD-10-CM

## 2020-03-10 DIAGNOSIS — I25.10 ATHEROSCLEROTIC HEART DISEASE OF NATIVE CORONARY ARTERY WITHOUT ANGINA PECTORIS: ICD-10-CM

## 2020-03-10 DIAGNOSIS — E03.9 HYPOTHYROIDISM, UNSPECIFIED: ICD-10-CM

## 2020-03-10 DIAGNOSIS — J45.909 UNSPECIFIED ASTHMA, UNCOMPLICATED: ICD-10-CM

## 2020-03-10 DIAGNOSIS — M25.562 PAIN IN LEFT KNEE: ICD-10-CM

## 2020-03-10 PROCEDURE — 99232 SBSQ HOSP IP/OBS MODERATE 35: CPT

## 2020-03-10 RX ADMIN — HEPARIN SODIUM 5000 UNIT(S): 5000 INJECTION INTRAVENOUS; SUBCUTANEOUS at 13:01

## 2020-03-10 RX ADMIN — Medication 5 MILLIGRAM(S): at 21:28

## 2020-03-10 RX ADMIN — Medication 100 MILLIGRAM(S): at 10:40

## 2020-03-10 RX ADMIN — TAMSULOSIN HYDROCHLORIDE 0.4 MILLIGRAM(S): 0.4 CAPSULE ORAL at 21:27

## 2020-03-10 RX ADMIN — PANTOPRAZOLE SODIUM 40 MILLIGRAM(S): 20 TABLET, DELAYED RELEASE ORAL at 21:27

## 2020-03-10 RX ADMIN — Medication 10 MILLIGRAM(S): at 10:40

## 2020-03-10 RX ADMIN — Medication 81 MILLIGRAM(S): at 10:40

## 2020-03-10 RX ADMIN — MONTELUKAST 10 MILLIGRAM(S): 4 TABLET, CHEWABLE ORAL at 10:40

## 2020-03-10 RX ADMIN — PANTOPRAZOLE SODIUM 40 MILLIGRAM(S): 20 TABLET, DELAYED RELEASE ORAL at 10:40

## 2020-03-10 RX ADMIN — HEPARIN SODIUM 5000 UNIT(S): 5000 INJECTION INTRAVENOUS; SUBCUTANEOUS at 21:27

## 2020-03-10 RX ADMIN — AMLODIPINE BESYLATE 10 MILLIGRAM(S): 2.5 TABLET ORAL at 21:28

## 2020-03-10 RX ADMIN — Medication 1 TABLET(S): at 10:40

## 2020-03-10 RX ADMIN — Medication 25 MICROGRAM(S): at 05:41

## 2020-03-10 RX ADMIN — SIMVASTATIN 5 MILLIGRAM(S): 20 TABLET, FILM COATED ORAL at 21:28

## 2020-03-10 NOTE — PROGRESS NOTE ADULT - SUBJECTIVE AND OBJECTIVE BOX
CHIEF COMPLAINT/diagnosis: upper gi bleed/ anemia/anemia    SUBJECTIVE: no complaints    REVIEW OF SYSTEMS:    CONSTITUTIONAL: No weakness, fevers or chills  EYES/ENT: No visual changes;  No vertigo or throat pain   NECK: No pain or stiffness  RESPIRATORY: No cough, wheezing, hemoptysis; No shortness of breath  CARDIOVASCULAR: No chest pain or palpitations  GASTROINTESTINAL: No abdominal or epigastric pain. No nausea, vomiting, or hematemesis; No diarrhea or constipation. No melena or hematochezia.  GENITOURINARY: No dysuria, frequency or hematuria  NEUROLOGICAL: No numbness or weakness  SKIN: No itching, burning, rashes, or lesions   All other review of systems is negative unless indicated above    Vital Signs Last 24 Hrs  T(C): 36.3 (10 Mar 2020 07:52), Max: 37 (09 Mar 2020 22:12)  T(F): 97.3 (10 Mar 2020 07:52), Max: 98.6 (09 Mar 2020 22:12)  HR: 63 (10 Mar 2020 10:46) (57 - 74)  BP: 115/53 (10 Mar 2020 10:46) (109/44 - 135/42)  BP(mean): --  RR: 20 (10 Mar 2020 07:52) (18 - 20)  SpO2: 92% (10 Mar 2020 07:52) (92% - 96%)    I&O's Summary    09 Mar 2020 07:01  -  10 Mar 2020 07:00  --------------------------------------------------------  IN: 0 mL / OUT: 1600 mL / NET: -1600 mL    10 Mar 2020 07:01  -  10 Mar 2020 15:37  --------------------------------------------------------  IN: 0 mL / OUT: 625 mL / NET: -625 mL        CAPILLARY BLOOD GLUCOSE          PHYSICAL EXAM:    Constitutional: NAD, awake and alert, well-developed  HEENT: PERR, EOMI, Normal Hearing, MMM  Neck: Soft and supple, No LAD, No JVD  Respiratory: Breath sounds are clear bilaterally, No wheezing, rales or rhonchi  Cardiovascular: S1 and S2, regular rate and rhythm, no Murmurs, gallops or rubs  Gastrointestinal: Bowel Sounds present, soft, nontender, nondistended, no guarding, no rebound  Extremities: No peripheral edema  Vascular: 2+ peripheral pulses  Neurological: A/O x 3, no focal deficits  Musculoskeletal: 5/5 strength b/l upper and lower extremities  Skin: No rashes    MEDICATIONS:  MEDICATIONS  (STANDING):  amLODIPine   Tablet 10 milliGRAM(s) Oral at bedtime  aspirin  chewable 81 milliGRAM(s) Oral daily  calcium carbonate 1250 mG  + Vitamin D (OsCal 500 + D) 1 Tablet(s) Oral daily  enalapril 10 milliGRAM(s) Oral daily  heparin  Injectable 5000 Unit(s) SubCutaneous every 12 hours  levothyroxine 25 MICROGram(s) Oral daily  melatonin 5 milliGRAM(s) Oral at bedtime  metoprolol succinate  milliGRAM(s) Oral daily  montelukast 10 milliGRAM(s) Oral daily  pantoprazole    Tablet 40 milliGRAM(s) Oral two times a day  simvastatin 5 milliGRAM(s) Oral at bedtime  tamsulosin 0.4 milliGRAM(s) Oral at bedtime      LABS: All Labs Reviewed:                        9.4    6.78  )-----------( 191      ( 09 Mar 2020 10:08 )             28.2     03-09    142  |  108  |  14  ----------------------------<  130<H>  4.3   |  30  |  0.88    Ca    8.6      09 Mar 2020 10:08  Phos  3.3     03-09  Mg     2.3     03-09            Blood Culture: 03-07 @ 04:10  Organism --  Gram Stain Blood -- Gram Stain --  Specimen Source .Urine Clean Catch (Midstream)  Culture-Blood -          Assessment and Plan:   	  88F.  admitted 03/05/20.  presented to ED from [facility] after a large melanotic bowel movement.    upper GIB.  acute blood loss anemia.  -serial HH.  -DC Plavix.  -continue ASA.  -S/P 2 units PRBCs.  -S/P EGD gastric and Duodenal ulcers  -continue Protonix BID   -GI consult appreciated     Urinary Retention  -S/P Straight Cath X 3, continue to monitor with bladder scans  -start flomax  -continue juárez and trial of void at rehab or FU with Urology in 1 week    equivocal TnI.    CAD s/p PCI - of RCA In 2006 and PCI of LCX in 2007   -TnI 0.055 (03/01, 0.191)  -EKG LBBB (old).  no acute ischemic changes.  -ECHO  Estimated left ventricular ejection fraction is 55-60 %.  -BB + ACEI + statin.  -Cardiology consult appreciated   -hold  plavix for now .  -Continue ASA    hx pauses.  -S/P Tele   -caution w/ BB.    hx HFpEF.  -compensated.  -echo noted above.  -BB.    hx asthma.  -O2 PRN.  -NATALIIA HFA.  -Singulair.    Dispo - patient here with GI bleed; S/P EGD found to have gastric and duodenal ulcers on protonix BID; then developed Urinary retention requiring Juárez  -cleared for discharge to rehab.

## 2020-03-11 ENCOUNTER — TRANSCRIPTION ENCOUNTER (OUTPATIENT)
Age: 85
End: 2020-03-11

## 2020-03-11 VITALS
SYSTOLIC BLOOD PRESSURE: 111 MMHG | OXYGEN SATURATION: 95 % | DIASTOLIC BLOOD PRESSURE: 41 MMHG | TEMPERATURE: 98 F | HEART RATE: 55 BPM | RESPIRATION RATE: 18 BRPM

## 2020-03-11 PROCEDURE — 99239 HOSP IP/OBS DSCHRG MGMT >30: CPT

## 2020-03-11 RX ADMIN — Medication 10 MILLIGRAM(S): at 10:56

## 2020-03-11 RX ADMIN — Medication 1 TABLET(S): at 10:55

## 2020-03-11 RX ADMIN — Medication 25 MICROGRAM(S): at 05:36

## 2020-03-11 RX ADMIN — HEPARIN SODIUM 5000 UNIT(S): 5000 INJECTION INTRAVENOUS; SUBCUTANEOUS at 10:56

## 2020-03-11 RX ADMIN — PANTOPRAZOLE SODIUM 40 MILLIGRAM(S): 20 TABLET, DELAYED RELEASE ORAL at 10:55

## 2020-03-11 RX ADMIN — MONTELUKAST 10 MILLIGRAM(S): 4 TABLET, CHEWABLE ORAL at 10:55

## 2020-03-11 RX ADMIN — Medication 81 MILLIGRAM(S): at 10:55

## 2020-03-11 RX ADMIN — Medication 100 MILLIGRAM(S): at 10:56

## 2020-03-11 NOTE — DIETITIAN INITIAL EVALUATION ADULT. - PERTINENT MEDS FT
MEDICATIONS  (STANDING):  amLODIPine   Tablet 10 milliGRAM(s) Oral at bedtime  aspirin  chewable 81 milliGRAM(s) Oral daily  calcium carbonate 1250 mG  + Vitamin D (OsCal 500 + D) 1 Tablet(s) Oral daily  enalapril 10 milliGRAM(s) Oral daily  heparin  Injectable 5000 Unit(s) SubCutaneous every 12 hours  levothyroxine 25 MICROGram(s) Oral daily  melatonin 5 milliGRAM(s) Oral at bedtime  metoprolol succinate  milliGRAM(s) Oral daily  montelukast 10 milliGRAM(s) Oral daily  pantoprazole    Tablet 40 milliGRAM(s) Oral two times a day  simvastatin 5 milliGRAM(s) Oral at bedtime  tamsulosin 0.4 milliGRAM(s) Oral at bedtime    MEDICATIONS  (PRN):  ALBUTerol    90 MICROgram(s) HFA Inhaler 2 Puff(s) Inhalation every 6 hours PRN Shortness of Breath and/or Wheezing

## 2020-03-11 NOTE — PROGRESS NOTE ADULT - NSHPATTENDINGPLANDISCUSS_GEN_ALL_CORE
patient, nursing, staff
patient, nursing, staff
patient, nursing,staff
Patient, nurse
Patient, nurse

## 2020-03-11 NOTE — DIETITIAN INITIAL EVALUATION ADULT. - OTHER INFO
patient admitted to  03/01-03/04/20 due to fall, rhabdomyolysis and elevated troponin.  additionally, treated for mastoiditis/otitis media and treated w/ Augmentin.  was found to have cardiac rhythm pauses, evaluated by EP, BB dose decreased and patient discharged to [facility] w/ MCOT patch.    while at [facility], she had a large melanotic bowel movement while being showered by staff members.  transported back to  for further evaluation.    in ED, Hbg 7.5g/dL.  ED physician ordered 2 units of PRBCs and Protonix gtt was started.  GI evaluated in ED and plans to perform EGD in the AM.    Pt seen for Renal diet education. Pt speaks [place] but has difficulty with  phone.  ID: [id number] for conversation. Pt told  that she dosen't understand, she speaks [place]. The  responded in Scottish and the patient continued to state she dose not understand that she only speaks [place]. The  confirmed to RD that the  is speaking [place] and that the patient is also speaking [place]. Session ended due to possible confusion or [place] on patients part. Discussed with RN and asked to see family when they visit.     Of note. Pt with no noted h/x of renal issues, K is WNL, PO4 WNL, GFR is low but does not warrant a renal diet. Pt is also currently on a regular diet. Overall pt does not appear to need a renal diet. Will monitor pt as needed, please consult RD when family return or to discuss condition.    Recommend:  C/w regular diet

## 2020-03-11 NOTE — PROGRESS NOTE ADULT - SUBJECTIVE AND OBJECTIVE BOX
CHIEF COMPLAINT/Diagnosis: anemia/ gi bleed    SUBJECTIVE: no complaints    REVIEW OF SYSTEMS:    CONSTITUTIONAL: No weakness, fevers or chills  EYES/ENT: No visual changes;  No vertigo or throat pain   NECK: No pain or stiffness  RESPIRATORY: No cough, wheezing, hemoptysis; No shortness of breath  CARDIOVASCULAR: No chest pain or palpitations  GASTROINTESTINAL: No abdominal or epigastric pain. No nausea, vomiting, or hematemesis; No diarrhea or constipation. No melena or hematochezia.  GENITOURINARY: No dysuria, frequency or hematuria  NEUROLOGICAL: No numbness or weakness  SKIN: No itching, burning, rashes, or lesions   All other review of systems is negative unless indicated above    Vital Signs Last 24 Hrs  T(C): 36.5 (11 Mar 2020 07:50), Max: 36.7 (10 Mar 2020 20:44)  T(F): 97.7 (11 Mar 2020 07:50), Max: 98.1 (10 Mar 2020 20:44)  HR: 62 (11 Mar 2020 07:50) (56 - 66)  BP: 112/40 (11 Mar 2020 07:50) (112/40 - 145/42)  BP(mean): --  RR: 20 (11 Mar 2020 07:50) (18 - 20)  SpO2: 93% (11 Mar 2020 07:50) (93% - 97%)    I&O's Summary    10 Mar 2020 07:01  -  11 Mar 2020 07:00  --------------------------------------------------------  IN: 0 mL / OUT: 1375 mL / NET: -1375 mL    11 Mar 2020 07:01  -  11 Mar 2020 09:01  --------------------------------------------------------  IN: 0 mL / OUT: 750 mL / NET: -750 mL        CAPILLARY BLOOD GLUCOSE          PHYSICAL EXAM:    Constitutional: NAD, awake and alert, well-developed  HEENT: PERR, EOMI, Normal Hearing, MMM  Neck: Soft and supple, No LAD, No JVD  Respiratory: Breath sounds are clear bilaterally, No wheezing, rales or rhonchi  Cardiovascular: S1 and S2, regular rate and rhythm, no Murmurs, gallops or rubs  Gastrointestinal: Bowel Sounds present, soft, nontender, nondistended, no guarding, no rebound  Extremities: No peripheral edema  Vascular: 2+ peripheral pulses  Neurological: A/O x 3, no focal deficits  Musculoskeletal: 5/5 strength b/l upper and lower extremities  Skin: No rashes    MEDICATIONS:  MEDICATIONS  (STANDING):  amLODIPine   Tablet 10 milliGRAM(s) Oral at bedtime  aspirin  chewable 81 milliGRAM(s) Oral daily  calcium carbonate 1250 mG  + Vitamin D (OsCal 500 + D) 1 Tablet(s) Oral daily  enalapril 10 milliGRAM(s) Oral daily  heparin  Injectable 5000 Unit(s) SubCutaneous every 12 hours  levothyroxine 25 MICROGram(s) Oral daily  melatonin 5 milliGRAM(s) Oral at bedtime  metoprolol succinate  milliGRAM(s) Oral daily  montelukast 10 milliGRAM(s) Oral daily  pantoprazole    Tablet 40 milliGRAM(s) Oral two times a day  simvastatin 5 milliGRAM(s) Oral at bedtime  tamsulosin 0.4 milliGRAM(s) Oral at bedtime      LABS: All Labs Reviewed:                        9.4    6.78  )-----------( 191      ( 09 Mar 2020 10:08 )             28.2     03-09    142  |  108  |  14  ----------------------------<  130<H>  4.3   |  30  |  0.88    Ca    8.6      09 Mar 2020 10:08  Phos  3.3     03-09  Mg     2.3     03-09            Blood Culture: 03-07 @ 04:10  Organism --  Gram Stain Blood -- Gram Stain --  Specimen Source .Urine Clean Catch (Midstream)  Culture-Blood --        Assessment and Plan:   	  88F.  admitted 03/05/20.  presented to ED from [facility] after a large melanotic bowel movement.    upper GIB.  acute blood loss anemia.  -serial HH.  -DC Plavix.  -continue ASA.  -S/P 2 units PRBCs.  -S/P EGD gastric and Duodenal ulcers  -continue Protonix BID   -GI consult appreciated     Urinary Retention  -S/P Straight Cath X 3, continue to monitor with bladder scans  -start flomax  -continue juárez and trial of void at rehab or FU with Urology in 1 week    equivocal TnI.    CAD s/p PCI - of RCA In 2006 and PCI of LCX in 2007   -TnI 0.055 (03/01, 0.191)  -EKG LBBB (old).  no acute ischemic changes.  -ECHO  Estimated left ventricular ejection fraction is 55-60 %.  -BB + ACEI + statin.  -Cardiology consult appreciated   -hold  plavix for now .  -Continue ASA    hx pauses.  -S/P Tele   -caution w/ BB.    hx HFpEF.  -compensated.  -echo noted above.  -BB.    hx asthma.  -O2 PRN.  -NATALIIA HFA.  -Singulair.    Dispo - patient here with GI bleed; S/P EGD found to have gastric and duodenal ulcers on protonix BID; then developed Urinary retention requiring Juárez  -cleared for discharge to rehab. auth from insurance still pending

## 2020-03-11 NOTE — DISCHARGE NOTE NURSING/CASE MANAGEMENT/SOCIAL WORK - PATIENT PORTAL LINK FT
You can access the FollowMyHealth Patient Portal offered by [facility] by registering at the following website: [email or web address]. By joining [facility]’s FollowMyHealth portal, you will also be able to view your health information using other applications (apps) compatible with our system.

## 2020-03-16 DIAGNOSIS — Z95.5 PRESENCE OF CORONARY ANGIOPLASTY IMPLANT AND GRAFT: ICD-10-CM

## 2020-03-16 DIAGNOSIS — I50.32 CHRONIC DIASTOLIC (CONGESTIVE) HEART FAILURE: ICD-10-CM

## 2020-03-16 DIAGNOSIS — Z79.82 LONG TERM (CURRENT) USE OF ASPIRIN: ICD-10-CM

## 2020-03-16 DIAGNOSIS — D62 ACUTE POSTHEMORRHAGIC ANEMIA: ICD-10-CM

## 2020-03-16 DIAGNOSIS — I45.5 OTHER SPECIFIED HEART BLOCK: ICD-10-CM

## 2020-03-16 DIAGNOSIS — K21.9 GASTRO-ESOPHAGEAL REFLUX DISEASE WITHOUT ESOPHAGITIS: ICD-10-CM

## 2020-03-16 DIAGNOSIS — I11.0 HYPERTENSIVE HEART DISEASE WITH HEART FAILURE: ICD-10-CM

## 2020-03-16 DIAGNOSIS — R55 SYNCOPE AND COLLAPSE: ICD-10-CM

## 2020-03-16 DIAGNOSIS — J45.909 UNSPECIFIED ASTHMA, UNCOMPLICATED: ICD-10-CM

## 2020-03-16 DIAGNOSIS — K26.4 CHRONIC OR UNSPECIFIED DUODENAL ULCER WITH HEMORRHAGE: ICD-10-CM

## 2020-03-16 DIAGNOSIS — Z79.02 LONG TERM (CURRENT) USE OF ANTITHROMBOTICS/ANTIPLATELETS: ICD-10-CM

## 2020-03-16 DIAGNOSIS — I44.7 LEFT BUNDLE-BRANCH BLOCK, UNSPECIFIED: ICD-10-CM

## 2020-03-16 DIAGNOSIS — I25.10 ATHEROSCLEROTIC HEART DISEASE OF NATIVE CORONARY ARTERY WITHOUT ANGINA PECTORIS: ICD-10-CM

## 2020-03-16 DIAGNOSIS — E78.5 HYPERLIPIDEMIA, UNSPECIFIED: ICD-10-CM

## 2020-03-16 DIAGNOSIS — K25.4 CHRONIC OR UNSPECIFIED GASTRIC ULCER WITH HEMORRHAGE: ICD-10-CM

## 2020-03-16 DIAGNOSIS — R33.9 RETENTION OF URINE, UNSPECIFIED: ICD-10-CM

## 2020-03-27 ENCOUNTER — APPOINTMENT (OUTPATIENT)
Dept: ELECTROPHYSIOLOGY | Facility: CLINIC | Age: 85
End: 2020-03-27

## 2024-06-17 NOTE — PATIENT PROFILE ADULT - NSPROHMSYMPCOND_GEN_A_NUR
Echocardiogram some time this week here at [place]   [facility]     
respiratory/cardiovascular

## 2024-08-30 NOTE — DIETITIAN INITIAL EVALUATION ADULT. - ORAL INTAKE PTA
[Patient] : patient
[Patient] : patient
Pt dgt states there has been no changes in intake. Pt eating the same amount/good
